# Patient Record
Sex: FEMALE | Race: WHITE | NOT HISPANIC OR LATINO | Employment: FULL TIME | URBAN - METROPOLITAN AREA
[De-identification: names, ages, dates, MRNs, and addresses within clinical notes are randomized per-mention and may not be internally consistent; named-entity substitution may affect disease eponyms.]

---

## 2017-04-26 ENCOUNTER — RADIANT APPOINTMENT (OUTPATIENT)
Dept: GENERAL RADIOLOGY | Facility: CLINIC | Age: 32
End: 2017-04-26
Attending: PHYSICIAN ASSISTANT
Payer: COMMERCIAL

## 2017-04-26 ENCOUNTER — OFFICE VISIT (OUTPATIENT)
Dept: FAMILY MEDICINE | Facility: CLINIC | Age: 32
End: 2017-04-26
Payer: COMMERCIAL

## 2017-04-26 VITALS
DIASTOLIC BLOOD PRESSURE: 77 MMHG | BODY MASS INDEX: 20.94 KG/M2 | SYSTOLIC BLOOD PRESSURE: 114 MMHG | OXYGEN SATURATION: 97 % | TEMPERATURE: 98.9 F | WEIGHT: 141.4 LBS | HEART RATE: 91 BPM | HEIGHT: 69 IN
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
## 2017-04-26 DIAGNOSIS — M54.2 CERVICALGIA: ICD-10-CM

## 2017-04-26 DIAGNOSIS — M54.2 CERVICALGIA: Primary | ICD-10-CM

## 2017-04-26 DIAGNOSIS — M54.6 ACUTE BILATERAL THORACIC BACK PAIN: ICD-10-CM

## 2017-04-26 PROCEDURE — 72040 X-RAY EXAM NECK SPINE 2-3 VW: CPT

## 2017-04-26 PROCEDURE — 72072 X-RAY EXAM THORAC SPINE 3VWS: CPT

## 2017-04-26 PROCEDURE — 99213 OFFICE O/P EST LOW 20 MIN: CPT | Performed by: PHYSICIAN ASSISTANT

## 2017-04-26 RX ORDER — CYCLOBENZAPRINE HCL 10 MG
5-10 TABLET ORAL 3 TIMES DAILY PRN
Qty: 30 TABLET | Refills: 1 | Status: SHIPPED | OUTPATIENT
Start: 2017-04-26 | End: 2018-03-29
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
## 2017-04-26 NOTE — PATIENT INSTRUCTIONS
Use half tabs of the muscle relaxer during the day if needed, take a whole tab before bed x1 week, as needed after that.  Aleve: 1 tab twice daily x5-7 days, as needed use after that.

## 2018-01-19 DIAGNOSIS — Z30.41 ENCOUNTER FOR SURVEILLANCE OF CONTRACEPTIVE PILLS: ICD-10-CM

## 2018-01-19 RX ORDER — LEVONORGESTREL AND ETHINYL ESTRADIOL 0.15-0.03
KIT ORAL
Qty: 84 TABLET | Refills: 0 | Status: SHIPPED | OUTPATIENT
Start: 2018-01-19 | End: 2018-03-29

## 2018-01-19 NOTE — TELEPHONE ENCOUNTER
"Requested Prescriptions   Pending Prescriptions Disp Refills     MARLISSA 0.15-30 MG-MCG per tablet [Pharmacy Med Name: MARLISSA-28 TABLET] 84 tablet 0    Last Written Prescription Date:  10/30/17  Last Fill Quantity: 84,  # refills: 0   Last Office Visit:  04/26/17  Future Office Visit:  none   Sig: TAKE 1 TABLET BY MOUTH DAILY    Contraceptives Protocol Passed    1/19/2018 12:58 AM       Passed - Patient is not a current smoker if age is 35 or older       Passed - Recent or future visit with authorizing provider's specialty    Patient had office visit in the last year or has a visit in the next 30 days with authorizing provider.  See \"Patient Info\" tab in inbasket, or \"Choose Columns\" in Meds & Orders section of the refill encounter.            Passed - No active pregnancy on record       Passed - No positive pregnancy test in past 12 months          "

## 2018-03-29 ENCOUNTER — TELEPHONE (OUTPATIENT)
Dept: FAMILY MEDICINE | Facility: CLINIC | Age: 33
End: 2018-03-29

## 2018-03-29 ENCOUNTER — OFFICE VISIT (OUTPATIENT)
Dept: FAMILY MEDICINE | Facility: CLINIC | Age: 33
End: 2018-03-29
Payer: COMMERCIAL

## 2018-03-29 VITALS
HEART RATE: 86 BPM | OXYGEN SATURATION: 98 % | TEMPERATURE: 97.7 F | BODY MASS INDEX: 19.44 KG/M2 | DIASTOLIC BLOOD PRESSURE: 73 MMHG | SYSTOLIC BLOOD PRESSURE: 111 MMHG | WEIGHT: 135.8 LBS | HEIGHT: 70 IN

## 2018-03-29 DIAGNOSIS — R09.81 CHRONIC NASAL CONGESTION: ICD-10-CM

## 2018-03-29 DIAGNOSIS — L70.0 COMEDONAL ACNE: ICD-10-CM

## 2018-03-29 DIAGNOSIS — L70.8 INFLAMMATORY ACNE: ICD-10-CM

## 2018-03-29 DIAGNOSIS — J30.2 ACUTE SEASONAL ALLERGIC RHINITIS, UNSPECIFIED TRIGGER: Primary | ICD-10-CM

## 2018-03-29 DIAGNOSIS — Z00.00 ROUTINE GENERAL MEDICAL EXAMINATION AT A HEALTH CARE FACILITY: Primary | ICD-10-CM

## 2018-03-29 DIAGNOSIS — Z30.41 ENCOUNTER FOR SURVEILLANCE OF CONTRACEPTIVE PILLS: ICD-10-CM

## 2018-03-29 LAB
CHOLEST SERPL-MCNC: 190 MG/DL
GLUCOSE SERPL-MCNC: 89 MG/DL (ref 70–99)
HDLC SERPL-MCNC: 93 MG/DL
LDLC SERPL CALC-MCNC: 89 MG/DL
NONHDLC SERPL-MCNC: 97 MG/DL
TRIGL SERPL-MCNC: 41 MG/DL

## 2018-03-29 PROCEDURE — 87624 HPV HI-RISK TYP POOLED RSLT: CPT | Performed by: PHYSICIAN ASSISTANT

## 2018-03-29 PROCEDURE — 80061 LIPID PANEL: CPT | Performed by: PHYSICIAN ASSISTANT

## 2018-03-29 PROCEDURE — 99395 PREV VISIT EST AGE 18-39: CPT | Mod: 25 | Performed by: PHYSICIAN ASSISTANT

## 2018-03-29 PROCEDURE — G0145 SCR C/V CYTO,THINLAYER,RESCR: HCPCS | Performed by: PHYSICIAN ASSISTANT

## 2018-03-29 PROCEDURE — 90471 IMMUNIZATION ADMIN: CPT | Performed by: PHYSICIAN ASSISTANT

## 2018-03-29 PROCEDURE — 82947 ASSAY GLUCOSE BLOOD QUANT: CPT | Performed by: PHYSICIAN ASSISTANT

## 2018-03-29 PROCEDURE — 90632 HEPA VACCINE ADULT IM: CPT | Performed by: PHYSICIAN ASSISTANT

## 2018-03-29 PROCEDURE — 36415 COLL VENOUS BLD VENIPUNCTURE: CPT | Performed by: PHYSICIAN ASSISTANT

## 2018-03-29 RX ORDER — FLUTICASONE PROPIONATE 50 MCG
2 SPRAY, SUSPENSION (ML) NASAL DAILY
Qty: 16 G | Refills: 11 | Status: SHIPPED | OUTPATIENT
Start: 2018-03-29 | End: 2018-03-29

## 2018-03-29 RX ORDER — MOMETASONE FUROATE MONOHYDRATE 50 UG/1
2 SPRAY, METERED NASAL DAILY
Qty: 3 BOX | Refills: 3 | Status: SHIPPED | OUTPATIENT
Start: 2018-03-29 | End: 2020-11-10

## 2018-03-29 RX ORDER — LEVONORGESTREL AND ETHINYL ESTRADIOL 0.15-0.03
1 KIT ORAL DAILY
Qty: 84 TABLET | Refills: 3 | Status: SHIPPED | OUTPATIENT
Start: 2018-03-29 | End: 2019-04-23

## 2018-03-29 RX ORDER — ERYTHROMYCIN AND BENZOYL PEROXIDE 30; 50 MG/G; MG/G
GEL TOPICAL
Qty: 45 G | Refills: 3 | Status: ON HOLD | OUTPATIENT
Start: 2018-03-29 | End: 2022-12-27

## 2018-03-29 RX ORDER — ADAPALENE 45 G/G
GEL TOPICAL
Qty: 45 G | Refills: 3 | Status: SHIPPED | OUTPATIENT
Start: 2018-03-29 | End: 2020-11-10

## 2018-03-29 ASSESSMENT — ANXIETY QUESTIONNAIRES
6. BECOMING EASILY ANNOYED OR IRRITABLE: NOT AT ALL
5. BEING SO RESTLESS THAT IT IS HARD TO SIT STILL: NOT AT ALL
3. WORRYING TOO MUCH ABOUT DIFFERENT THINGS: NOT AT ALL
GAD7 TOTAL SCORE: 0
2. NOT BEING ABLE TO STOP OR CONTROL WORRYING: NOT AT ALL
IF YOU CHECKED OFF ANY PROBLEMS ON THIS QUESTIONNAIRE, HOW DIFFICULT HAVE THESE PROBLEMS MADE IT FOR YOU TO DO YOUR WORK, TAKE CARE OF THINGS AT HOME, OR GET ALONG WITH OTHER PEOPLE: NOT DIFFICULT AT ALL
1. FEELING NERVOUS, ANXIOUS, OR ON EDGE: NOT AT ALL
7. FEELING AFRAID AS IF SOMETHING AWFUL MIGHT HAPPEN: NOT AT ALL

## 2018-03-29 ASSESSMENT — PATIENT HEALTH QUESTIONNAIRE - PHQ9: 5. POOR APPETITE OR OVEREATING: NOT AT ALL

## 2018-03-29 NOTE — MR AVS SNAPSHOT
After Visit Summary   3/29/2018    Debbi Grossman    MRN: [id number]           Patient Information     Date Of Birth          1985        Visit Information        Provider Department      3/29/2018 7:20 AM Marixa Roche PA-C [facility]        Today's Diagnoses     Routine general medical examination at a health care facility    -  1    Encounter for surveillance of contraceptive pills        Comedonal acne        Chronic nasal congestion        Inflammatory acne          Care Instructions      Preventive Health Recommendations  Female Ages 26 - 39  Yearly exam:   See your health care provider every year in order to    Review health changes.     Discuss preventive care.      Review your medicines if you your doctor has prescribed any.    Until age 30: Get a Pap test every three years (more often if you have had an abnormal result).    After age 30: Talk to your doctor about whether you should have a Pap test every 3 years or have a Pap test with HPV screening every 5 years.   You do not need a Pap test if your uterus was removed (hysterectomy) and you have not had cancer.  You should be tested each year for STDs (sexually transmitted diseases), if you're at risk.   Talk to your provider about how often to have your cholesterol checked.  If you are at risk for diabetes, you should have a diabetes test (fasting glucose).  Shots: Get a flu shot each year. Get a tetanus shot every 10 years.   Nutrition:     Eat at least 5 servings of fruits and vegetables each day.    Eat whole-grain bread, whole-wheat pasta and brown rice instead of white grains and rice.    Talk to your provider about Calcium and Vitamin D.     Lifestyle    Exercise at least 150 minutes a week (30 minutes a day, 5 days of the week). This will help you control your weight and prevent disease.    Limit alcohol to one drink per day.    No smoking.     Wear sunscreen to prevent skin cancer.    See your dentist every  six months for an exam and cleaning.      Preventive Health Recommendations  Female Ages 26 - 39  Yearly exam:   See your health care provider every year in order to    Review health changes.     Discuss preventive care.      Review your medicines if you your doctor has prescribed any.    Until age 30: Get a Pap test every three years (more often if you have had an abnormal result).    After age 30: Talk to your doctor about whether you should have a Pap test every 3 years or have a Pap test with HPV screening every 5 years.   You do not need a Pap test if your uterus was removed (hysterectomy) and you have not had cancer.  You should be tested each year for STDs (sexually transmitted diseases), if you're at risk.   Talk to your provider about how often to have your cholesterol checked.  If you are at risk for diabetes, you should have a diabetes test (fasting glucose).  Shots: Get a flu shot each year. Get a tetanus shot every 10 years.   Nutrition:     Eat at least 5 servings of fruits and vegetables each day.    Eat whole-grain bread, whole-wheat pasta and brown rice instead of white grains and rice.    Talk to your provider about Calcium and Vitamin D.     Lifestyle    Exercise at least 150 minutes a week (30 minutes a day, 5 days of the week). This will help you control your weight and prevent disease.    Limit alcohol to one drink per day.    No smoking.     Wear sunscreen to prevent skin cancer.    See your dentist every six months for an exam and cleaning.            Follow-ups after your visit        Who to contact     Normal or non-critical lab and imaging results will be communicated to you by MyChart, letter or phone within 4 business days after the clinic has received the results. If you do not hear from us within 7 days, please contact the clinic through MyChart or phone. If you have a critical or abnormal lab result, we will notify you by phone as soon as possible.  Submit refill requests through  "[employer] or call your pharmacy and they will forward the refill request to us. Please allow 3 business days for your refill to be completed.          If you need to speak with a  for additional information , please call: [phone]             Additional Information About Your Visit        MyChart Information     [employer] gives you secure access to your electronic health record. If you see a primary care provider, you can also send messages to your care team and make appointments. If you have questions, please call your primary care clinic.  If you do not have a primary care provider, please call [phone] and they will assist you.        Care EveryWhere ID     This is your Care EveryWhere ID. This could be used by other organizations to access your [place] medical records  [id number]        Your Vitals Were     Pulse Temperature Height Last Period Pulse Oximetry BMI (Body Mass Index)    86 97.7  F (36.5  C) (Tympanic) 5' 9.61\" (1.768 m) 03/15/2018 98% 19.71 kg/m2       Blood Pressure from Last 3 Encounters:   03/29/18 111/73   04/26/17 114/77   12/29/16 106/71    Weight from Last 3 Encounters:   03/29/18 135 lb 12.8 oz (61.6 kg)   04/26/17 141 lb 6.4 oz (64.1 kg)   12/29/16 140 lb 12.8 oz (63.9 kg)              We Performed the Following     Glucose     HEPATITIS A VACCINE (ADULT)     HPV High Risk Types DNA Cervical     Lipid panel reflex to direct LDL Fasting     Pap imaged thin layer screen with HPV - recommended age 30 - 65 years (select HPV order below)          Today's Medication Changes          These changes are accurate as of 3/29/18  8:09 AM.  If you have any questions, ask your nurse or doctor.               These medicines have changed or have updated prescriptions.        Dose/Directions    levonorgestrel-ethinyl estradiol 0.15-30 MG-MCG per tablet   Commonly known as:  MARLISSA   This may have changed:  See the new instructions.   Used for:  Encounter for surveillance of " contraceptive pills   Changed by:  Marixa Roche PA-C        Dose:  1 tablet   Take 1 tablet by mouth daily   Quantity:  84 tablet   Refills:  3         Stop taking these medicines if you haven't already. Please contact your care team if you have questions.     cyclobenzaprine 10 MG tablet   Commonly known as:  FLEXERIL   Stopped by:  Marixa Roche PA-C                Where to get your medicines      These medications were sent to [place] [address] [facility] [place] - [address]  [address] [place] [address]     Phone:  [phone]     adapalene 0.1 % gel    benzoyl peroxide-erythromycin topical gel    fluticasone 50 MCG/ACT spray    levonorgestrel-ethinyl estradiol 0.15-30 MG-MCG per tablet                Primary Care Provider Office Phone # Fax #    Marixa Roche PA-C [phone] [address] [facility] [address]  [place] [address]        Equal Access to Services     [facility]: Hadii aad ku hadasho Soomaali, waaxda luqadaha, qaybta kaalmada adeegyada, waxay idiin hayaan adeeg kharasandra la'michel . So [facility] [phone].    ATENCIÓN: Si habla español, tiene a toney disposición servicios gratuitos de asistencia lingüística. [facility] [phone].    We comply with applicable federal civil rights laws and [place] laws. We do not discriminate on the basis of race, color, national origin, age, disability, sex, sexual orientation, or gender identity.            Thank you!     Thank you for choosing [facility]  for your care. Our goal is always to provide you with excellent care. Hearing back from our patients is one way we can continue to improve our services. Please take a few minutes to complete the written survey that you may receive in the mail after your visit with us. Thank you!             Your Updated Medication List - Protect others around you: Learn how to safely use, store and throw away your medicines at www.disposemymeds.org.          This list is accurate  as of 3/29/18  8:09 AM.  Always use your most recent med list.                   Brand Name Dispense Instructions for use Diagnosis    adapalene 0.1 % gel    DIFFERIN    45 g    Apply  topically At Bedtime.Need annual office visit    Comedonal acne       benzoyl peroxide-erythromycin topical gel    BENZAMYCIN    45 g    Apply twice daily    Inflammatory acne       fexofenadine-pseudoePHEDrine 180-240 MG per 24 hr tablet    ALLEGRA-D 24    90 tablet    Take 1 tablet by mouth daily.    Chronic nasal congestion       fluticasone 50 MCG/ACT spray    FLONASE    16 g    Spray 2 sprays into both nostrils daily    Chronic nasal congestion       levonorgestrel-ethinyl estradiol 0.15-30 MG-MCG per tablet    MARLISSA    84 tablet    Take 1 tablet by mouth daily    Encounter for surveillance of contraceptive pills

## 2018-03-29 NOTE — PROGRESS NOTES
SUBJECTIVE:   CC: Debbi Grossman is an 32 year old woman who presents for preventive health visit.     Healthy Habits:    Do you get at least three servings of calcium containing foods daily (dairy, green leafy vegetables, etc.)? yes    Amount of exercise or daily activities, outside of work: 3 day(s) per week    Problems taking medications regularly No    Medication side effects: No    Have you had an eye exam in the past two years? yes    Do you see a dentist twice per year? yes    Do you have sleep apnea, excessive snoring or daytime drowsiness?no      PROBLEMS TO ADD ON...  none  Patient informed that anything we discuss that is not related to preventative medicine, may be billed for; patient verbalizes understanding.    Today's PHQ-2 Score:   PHQ-2 ( 1999 [employer]) 12/29/2016 8/4/2015   Q1: Little interest or pleasure in doing things 0 0   Q2: Feeling down, depressed or hopeless 0 0   PHQ-2 Score 0 0       Abuse: Current or Past(Physical, Sexual or Emotional)- No  Do you feel safe in your environment - Yes    Social History   Substance Use Topics     Smoking status: Never Smoker     Smokeless tobacco: Never Used     Alcohol use Yes      Comment: Less than once a month     If you drink alcohol do you typically have >3 drinks per day or >7 drinks per week? No                     Reviewed orders with patient.  Reviewed health maintenance and updated orders accordingly - Yes  Labs reviewed in EPIC    Mammogram not appropriate for this patient based on age.    Pertinent mammograms are reviewed under the imaging tab.  History of abnormal Pap smear:   NO - age 30- 65 PAP every 3 years recommended  Last 3 Pap Results:   PAP (no units)   Date Value   08/04/2015 NIL   05/10/2012 NIL   02/16/2011 NIL       Reviewed and updated as needed this visit by clinical staff  Tobacco  Allergies  Meds         Reviewed and updated as needed this visit by Provider        Past Medical History:   Diagnosis Date     CP (cerebral  "palsy) (H)      Seasonal allergies         ROS:  Other than what is noted in the HPI and PMH a complete review of systems is otherwise negative including: Constitutional, HEENT, endocrine, cardiovascular, respiratory, GI/, musculoskeletal, neuro, and psychiatric.     OBJECTIVE:   /73  Pulse 86  Temp 97.7  F (36.5  C) (Tympanic)  Ht 5' 9.61\" (1.768 m)  Wt 135 lb 12.8 oz (61.6 kg)  LMP 03/15/2018  SpO2 98%  BMI 19.71 kg/m2  EXAM:  GENERAL: healthy, alert and no distress  EYES: Eyes grossly normal to inspection, PERRL and conjunctivae and sclerae normal  HENT: ear canals and TM's normal, nose and mouth without ulcers or lesions  NECK: no adenopathy, no asymmetry, masses, or scars and thyroid normal to palpation  RESP: lungs clear to auscultation - no rales, rhonchi or wheezes  BREAST: normal without masses, tenderness or nipple discharge and no palpable axillary masses or adenopathy  CV: regular rate and rhythm, normal S1 S2, no S3 or S4, no murmur, click or rub, no peripheral edema and peripheral pulses strong  ABDOMEN: soft, nontender, no hepatosplenomegaly, no masses and bowel sounds normal   (female): normal female external genitalia, normal urethral meatus, vaginal mucosa pink, moist, well rugated, and normal cervix  MS: no gross musculoskeletal defects noted, no edema  SKIN: no suspicious lesions or rashes  NEURO: Normal strength and tone, mentation intact and speech normal  PSYCH: mentation appears normal, affect normal/bright    ASSESSMENT/PLAN:       ICD-10-CM    1. Routine general medical examination at a health care facility Z00.00 Lipid panel reflex to direct LDL Fasting     Glucose     Pap imaged thin layer screen with HPV - recommended age 30 - 65 years (select HPV order below)     HPV High Risk Types DNA Cervical     HEPATITIS A VACCINE (ADULT)   2. Encounter for surveillance of contraceptive pills Z30.41 levonorgestrel-ethinyl estradiol (MARLISSA) 0.15-30 MG-MCG per tablet   3. " "Comedonal acne L70.0 adapalene (DIFFERIN) 0.1 % gel   4. Chronic nasal congestion R09.81 fluticasone (FLONASE) 50 MCG/ACT spray   5. Inflammatory acne L70.8 benzoyl peroxide-erythromycin (BENZAMYCIN) topical gel       Meds renewed, no changes. Pap and screening labs today. Follow up annually otherwise.      COUNSELING:   Reviewed preventive health counseling, as reflected in patient instructions       reports that she has never smoked. She has never used smokeless tobacco.    Estimated body mass index is 19.71 kg/(m^2) as calculated from the following:    Height as of this encounter: 5' 9.61\" (1.768 m).    Weight as of this encounter: 135 lb 12.8 oz (61.6 kg).       Counseling Resources:  ATP IV Guidelines  Pooled Cohorts Equation Calculator  Breast Cancer Risk Calculator  FRAX Risk Assessment  ICSI Preventive Guidelines  Dietary Guidelines for Americans, 2010  USDA's MyPlate  ASA Prophylaxis  Lung CA Screening    Marixa Roche PA-C  [facility] [place]    "

## 2018-03-29 NOTE — LETTER
April 5, 2018    Debbi Grossman  [address] [place] [address]    Dear Debbi,  We are happy to inform you that your PAP smear result from 3/29/18 is normal.  We are now able to do a follow up test on PAP smears. The DNA test is for HPV (Human Papilloma Virus). Cervical cancer is closely linked with certain types of HPV. Your results showed no evidence of high risk HPV.  Therefore we recommend you return in 3 years for your next pap smear.  You will still need to return to the clinic every year for an annual exam and other preventive tests.  Please contact the clinic at [phone] with any questions.  Sincerely,    Marixa Roche PA-C/nicholas

## 2018-03-29 NOTE — TELEPHONE ENCOUNTER
INSURANCE DOES NOT COVER FLUTICASONE. INSURANCE COVERS MOMETASONE.   [facility] -[phone]

## 2018-03-29 NOTE — NURSING NOTE
Patient presented to clinic for Bilateral ear wash. Both ear(s) were inspected with an otoscope and found to have cerumen in the ear canal(s). The patient's ear(s) were washed out with a warm water. The patient did tolerate the procedure well.     Hep A vaccine given today     Screening performed by Shailesh Dawkins on 3/29/2018 at 8:41 AM.

## 2018-03-30 ASSESSMENT — ANXIETY QUESTIONNAIRES: GAD7 TOTAL SCORE: 0

## 2018-03-30 ASSESSMENT — PATIENT HEALTH QUESTIONNAIRE - PHQ9: SUM OF ALL RESPONSES TO PHQ QUESTIONS 1-9: 0

## 2018-04-03 LAB
COPATH REPORT: NORMAL
PAP: NORMAL

## 2018-04-04 LAB
FINAL DIAGNOSIS: NORMAL
HPV HR 12 DNA CVX QL NAA+PROBE: NEGATIVE
HPV16 DNA SPEC QL NAA+PROBE: NEGATIVE
HPV18 DNA SPEC QL NAA+PROBE: NEGATIVE
SPECIMEN DESCRIPTION: NORMAL
SPECIMEN SOURCE CVX/VAG CYTO: NORMAL

## 2018-04-14 DIAGNOSIS — Z30.41 ENCOUNTER FOR SURVEILLANCE OF CONTRACEPTIVE PILLS: ICD-10-CM

## 2018-04-16 RX ORDER — LEVONORGESTREL AND ETHINYL ESTRADIOL 0.15-0.03
KIT ORAL
Qty: 84 TABLET | Refills: 0 | OUTPATIENT
Start: 2018-04-16

## 2018-04-16 NOTE — TELEPHONE ENCOUNTER
"Requested Prescriptions   Pending Prescriptions Disp Refills     MARLISSA 0.15-30 MG-MCG per tablet [Pharmacy Med Name: MARLISSA-28 TABLET] 84 tablet 0    Last Written Prescription Date:  1-23-18  Last Fill Quantity: 84,  # refills: 0   Last office visit: 3/29/2018 with prescribing provider:  3-29-18   Future Office Visit:   Sig: TAKE 1 TABLET BY MOUTH DAILY    Contraceptives Protocol Passed    4/14/2018  1:08 AM       Passed - Patient is not a current smoker if age is 35 or older       Passed - Recent (12 mo) or future (30 days) visit within the authorizing provider's specialty    Patient had office visit in the last 12 months or has a visit in the next 30 days with authorizing provider or within the authorizing provider's specialty.  See \"Patient Info\" tab in inbasket, or \"Choose Columns\" in Meds & Orders section of the refill encounter.           Passed - No active pregnancy on record       Passed - No positive pregnancy test in past 12 months        "

## 2019-01-15 ENCOUNTER — NURSE TRIAGE (OUTPATIENT)
Dept: NURSING | Facility: CLINIC | Age: 34
End: 2019-01-15

## 2019-01-15 NOTE — TELEPHONE ENCOUNTER
FNA triage call :   Presenting problem :  From phone  8 weeks pregnant  Pt called and has not been seen yet .  Constipation concern abdominal pain is 3/10 and mild bloated but relieved with BM  , last BM yesterday , nauseated last week for 2 hour , and again 1/10/19 and 1/11/19 for few hours .  Currently : no fever ,   Guideline used :  Pregnancy - constipation and then  pregnancy - abdominal pain < 20 weeks.   Disposition and recommendations : NPO and have someone drive you to  ED but Pt declines , and declines help with appt for 2nd opinion   . Reviewed Epic reference on planning exercise , pregnancy .   Caller verbalizes understanding and denies further questions and will call back if further symptoms to triage or questions  . Beronica Saldana RN  - [place] Nurse Advisor       Reason for Disposition    MODERATE-SEVERE abdominal pain (e.g., interferes with normal activities, awakens from sleep)    Unable to have a bowel movement (BM) without laxative or enema    Additional Information    Negative: [1] Abdominal pain AND [2] pregnant > 20 weeks    [1] Abdominal pain AND [2] pregnant < 20 weeks    Negative: Passed out (i.e., lost consciousness, collapsed and was not responding)    Negative: Shock suspected (e.g., cold/pale/clammy skin, too weak to stand, low BP, rapid pulse)    Negative: Difficult to awaken or acting confused  (e.g., disoriented, slurred speech)    Negative: Sounds like a life-threatening emergency to the triager    Negative: Followed an abdomen (stomach) injury    Negative: [1] Abdominal pain AND [2] pregnant > 20 weeks    Protocols used: PREGNANCY - ABDOMINAL PAIN LESS THAN 20 WEEKS EGA-ADULT-AH, PREGNANCY - CONSTIPATION-ADULT-AH

## 2019-01-28 DIAGNOSIS — O36.80X0 PREGNANCY WITH INCONCLUSIVE FETAL VIABILITY: Primary | ICD-10-CM

## 2019-01-29 ENCOUNTER — PRENATAL OFFICE VISIT (OUTPATIENT)
Dept: OBGYN | Facility: CLINIC | Age: 34
End: 2019-01-29
Payer: COMMERCIAL

## 2019-01-29 ENCOUNTER — ANCILLARY PROCEDURE (OUTPATIENT)
Dept: ULTRASOUND IMAGING | Facility: CLINIC | Age: 34
End: 2019-01-29
Payer: COMMERCIAL

## 2019-01-29 VITALS
HEART RATE: 68 BPM | HEIGHT: 70 IN | BODY MASS INDEX: 20.04 KG/M2 | DIASTOLIC BLOOD PRESSURE: 74 MMHG | SYSTOLIC BLOOD PRESSURE: 108 MMHG | WEIGHT: 140 LBS

## 2019-01-29 DIAGNOSIS — Z23 NEED FOR PROPHYLACTIC VACCINATION AND INOCULATION AGAINST INFLUENZA: ICD-10-CM

## 2019-01-29 DIAGNOSIS — O36.80X0 PREGNANCY WITH INCONCLUSIVE FETAL VIABILITY: ICD-10-CM

## 2019-01-29 DIAGNOSIS — Z13.79 GENETIC SCREENING: ICD-10-CM

## 2019-01-29 DIAGNOSIS — Z34.01 ENCOUNTER FOR SUPERVISION OF NORMAL FIRST PREGNANCY IN FIRST TRIMESTER: Primary | ICD-10-CM

## 2019-01-29 PROBLEM — Z34.00 SUPERVISION OF NORMAL IUP (INTRAUTERINE PREGNANCY) IN PRIMIGRAVIDA: Status: ACTIVE | Noted: 2019-01-29

## 2019-01-29 LAB
ABO + RH BLD: NORMAL
ABO + RH BLD: NORMAL
ALBUMIN UR-MCNC: NEGATIVE MG/DL
APPEARANCE UR: CLEAR
BACTERIA #/AREA URNS HPF: ABNORMAL /HPF
BILIRUB UR QL STRIP: NEGATIVE
BLD GP AB SCN SERPL QL: NORMAL
BLOOD BANK CMNT PATIENT-IMP: NORMAL
COLOR UR AUTO: YELLOW
ERYTHROCYTE [DISTWIDTH] IN BLOOD BY AUTOMATED COUNT: 12.9 % (ref 10–15)
GLUCOSE UR STRIP-MCNC: NEGATIVE MG/DL
HCT VFR BLD AUTO: 36.9 % (ref 35–47)
HGB BLD-MCNC: 12.7 G/DL (ref 11.7–15.7)
HGB UR QL STRIP: NEGATIVE
KETONES UR STRIP-MCNC: ABNORMAL MG/DL
LEUKOCYTE ESTERASE UR QL STRIP: ABNORMAL
MCH RBC QN AUTO: 31.3 PG (ref 26.5–33)
MCHC RBC AUTO-ENTMCNC: 34.4 G/DL (ref 31.5–36.5)
MCV RBC AUTO: 91 FL (ref 78–100)
NITRATE UR QL: NEGATIVE
NON-SQ EPI CELLS #/AREA URNS LPF: ABNORMAL /LPF
PH UR STRIP: 5.5 PH (ref 5–7)
PLATELET # BLD AUTO: 266 10E9/L (ref 150–450)
RBC # BLD AUTO: 4.06 10E12/L (ref 3.8–5.2)
RBC #/AREA URNS AUTO: ABNORMAL /HPF
SOURCE: ABNORMAL
SP GR UR STRIP: 1.02 (ref 1–1.03)
SPECIMEN EXP DATE BLD: NORMAL
UROBILINOGEN UR STRIP-ACNC: 0.2 EU/DL (ref 0.2–1)
WBC # BLD AUTO: 8.9 10E9/L (ref 4–11)
WBC #/AREA URNS AUTO: ABNORMAL /HPF

## 2019-01-29 PROCEDURE — 87340 HEPATITIS B SURFACE AG IA: CPT | Performed by: OBSTETRICS & GYNECOLOGY

## 2019-01-29 PROCEDURE — 36415 COLL VENOUS BLD VENIPUNCTURE: CPT | Performed by: OBSTETRICS & GYNECOLOGY

## 2019-01-29 PROCEDURE — 86901 BLOOD TYPING SEROLOGIC RH(D): CPT | Performed by: OBSTETRICS & GYNECOLOGY

## 2019-01-29 PROCEDURE — 99207 ZZC FIRST OB VISIT: CPT | Performed by: OBSTETRICS & GYNECOLOGY

## 2019-01-29 PROCEDURE — 87389 HIV-1 AG W/HIV-1&-2 AB AG IA: CPT | Performed by: OBSTETRICS & GYNECOLOGY

## 2019-01-29 PROCEDURE — 81001 URINALYSIS AUTO W/SCOPE: CPT | Performed by: OBSTETRICS & GYNECOLOGY

## 2019-01-29 PROCEDURE — 76817 TRANSVAGINAL US OBSTETRIC: CPT | Performed by: OBSTETRICS & GYNECOLOGY

## 2019-01-29 PROCEDURE — 86850 RBC ANTIBODY SCREEN: CPT | Performed by: OBSTETRICS & GYNECOLOGY

## 2019-01-29 PROCEDURE — 86900 BLOOD TYPING SEROLOGIC ABO: CPT | Performed by: OBSTETRICS & GYNECOLOGY

## 2019-01-29 PROCEDURE — 86780 TREPONEMA PALLIDUM: CPT | Performed by: OBSTETRICS & GYNECOLOGY

## 2019-01-29 PROCEDURE — 85027 COMPLETE CBC AUTOMATED: CPT | Performed by: OBSTETRICS & GYNECOLOGY

## 2019-01-29 PROCEDURE — 87086 URINE CULTURE/COLONY COUNT: CPT | Performed by: OBSTETRICS & GYNECOLOGY

## 2019-01-29 PROCEDURE — 86762 RUBELLA ANTIBODY: CPT | Performed by: OBSTETRICS & GYNECOLOGY

## 2019-01-29 SDOH — HEALTH STABILITY: MENTAL HEALTH: HOW OFTEN DO YOU HAVE A DRINK CONTAINING ALCOHOL?: NEVER

## 2019-01-29 ASSESSMENT — ANXIETY QUESTIONNAIRES
6. BECOMING EASILY ANNOYED OR IRRITABLE: SEVERAL DAYS
1. FEELING NERVOUS, ANXIOUS, OR ON EDGE: NOT AT ALL
7. FEELING AFRAID AS IF SOMETHING AWFUL MIGHT HAPPEN: NOT AT ALL
IF YOU CHECKED OFF ANY PROBLEMS ON THIS QUESTIONNAIRE, HOW DIFFICULT HAVE THESE PROBLEMS MADE IT FOR YOU TO DO YOUR WORK, TAKE CARE OF THINGS AT HOME, OR GET ALONG WITH OTHER PEOPLE: NOT DIFFICULT AT ALL
5. BEING SO RESTLESS THAT IT IS HARD TO SIT STILL: NOT AT ALL
3. WORRYING TOO MUCH ABOUT DIFFERENT THINGS: NOT AT ALL
GAD7 TOTAL SCORE: 1
2. NOT BEING ABLE TO STOP OR CONTROL WORRYING: NOT AT ALL

## 2019-01-29 ASSESSMENT — MIFFLIN-ST. JEOR: SCORE: 1414.1

## 2019-01-29 ASSESSMENT — PATIENT HEALTH QUESTIONNAIRE - PHQ9
5. POOR APPETITE OR OVEREATING: NOT AT ALL
SUM OF ALL RESPONSES TO PHQ QUESTIONS 1-9: 0

## 2019-01-29 NOTE — PATIENT INSTRUCTIONS
Avoid alcoholic beverages.  Discussed all genetic testing options.  Patient will decide.  Reviewed usual and expected course of pregnancy including visits, labwork, imaging, vaccinations, etc.  RTC 4wks for routine OB visit.

## 2019-01-29 NOTE — PROGRESS NOTES
SUBJECTIVE:     HPI:    This is a 33 year old female patient,  who presents for her first obstetrical visit.    NORMAN: 2019, by Last Menstrual Period.  She is 10w4d weeks.  Her cycles are regular.  Her last menstrual period was normal.   Since her LMP, she has experienced  constipation and tenderness, weight loss).   She denies nausea, emesis, abdominal pain, fatigue, headache, loss of appetite, vaginal discharge, dysuria, pelvic pain, urinary urgency, lightheadedness, urinary frequency, vaginal bleeding and hemorrhoids.    Additional History: Cerebral Palsy - Lower legs affected with numbness and weakness and works out regularly    Have you travelled during the pregnancy?No  Have your sexual partner(s) travelled during the pregnancy?No    New patient --self referral --here for first OB visit!  Planned pregnancy; started TTC in summer of 2018; somewhat irregular menses.  Sure LMP and us today confirms dating.  No vb/spotting.  Has overall been feeling quite well.  Nausea on only a handful of occasions.  Constipation has been one of her biggest issues.  Otherwise very healthy; hx cerebral palsy --occ lower extremity weakness/numbness but managed with exercise; runs regularily --hoping to do 1/2 marathon at 7 months  -no medications   -[place]  -moved to [facility] from [place] last year  -unsure about genetic screening  -had flu shot in October    HISTORY:   Planned Pregnancy: Yes  Marital Status: Single  Occupation:  at [employer]  Living in Household: Spouse    Past History:  Her past medical history   Past Medical History:   Diagnosis Date     CP (cerebral palsy) (H)      Seasonal allergies    .      She has a history of  First Pregnancy    Since her last LMP she denies use of alcohol, tobacco and street drugs.    Past medical, surgical, social and family history were reviewed and updated in [facility].        Current Outpatient Medications   Medication     adapalene (DIFFERIN) 0.1 % gel      "benzoyl peroxide-erythromycin (BENZAMYCIN) topical gel     Prenatal Multivit-Min-Fe-FA (PRENATAL VITAMINS PO)     fexofenadine-pseudoephedrine (ALLEGRA-D 24) 180-240 MG per tablet     levonorgestrel-ethinyl estradiol (MARLISSA) 0.15-30 MG-MCG per tablet     mometasone (NASONEX) 50 MCG/ACT spray     No current facility-administered medications for this visit.        ROS:   12 point review of systems negative other than symptoms noted below.  Constitutional: Weight Loss  Breast: Tenderness  Gastrointestinal: Constipation      OBJECTIVE:     EXAM:  /74 (BP Location: Left arm, Patient Position: Sitting, Cuff Size: Adult Regular)   Pulse 68   Ht 1.768 m (5' 9.61\")   Wt 63.5 kg (140 lb)   LMP 11/16/2018   BMI 20.31 kg/m   Body mass index is 20.31 kg/m .    GENERAL: healthy, alert and no distress  EYES: Eyes grossly normal to inspection, PERRL and conjunctivae and sclerae normal  HENT: ear canals and TM's normal, nose and mouth without ulcers or lesions  NECK: no adenopathy, no asymmetry, masses, or scars and thyroid normal to palpation  RESP: lungs clear to auscultation - no rales, rhonchi or wheezes  BREAST: normal without masses, tenderness or nipple discharge and no palpable axillary masses or adenopathy  CV: regular rate and rhythm, normal S1 S2, no S3 or S4, no murmur, click or rub, no peripheral edema and peripheral pulses strong  ABDOMEN: soft, nontender, no hepatosplenomegaly, no masses and bowel sounds normal  MS: no gross musculoskeletal defects noted, no edema  SKIN: no suspicious lesions or rashes  NEURO: Normal strength and tone, mentation intact and speech normal  PSYCH: mentation appears normal, affect normal/bright    ASSESSMENT/PLAN:       ICD-10-CM    1. Encounter for supervision of normal first pregnancy in first trimester Z34.01 ABO/Rh type and screen     Hepatitis B surface antigen     CBC with platelets     HIV Antigen Antibody Combo     Rubella Antibody IgG Quantitative     Treponema Abs " w Reflex to RPR and Titer     Urine Culture Aerobic Bacterial     UA with Microscopic   2. Genetic screening Z13.79 Non Invasive Prenatal Test Cell Free DNA   3. Need for prophylactic vaccination and inoculation against influenza Z23        33 year old , 10w4d weeks of pregnancy with NORMAN of 2019, by Last Menstrual Period    Discussed as follows:genetic screening, exercise in pregnancy, vaccinations, cerebral palsy    Counseling given:   - Follow up in 4-6 weeks for return OB visit.  - Recommended weight gain for pregnancy: 25-35 lbs.      PLAN/PATIENT INSTRUCTIONS:    Patient Instructions   Avoid alcoholic beverages.  Discussed all genetic testing options.  Patient will decide.  Reviewed usual and expected course of pregnancy including visits, labwork, imaging, vaccinations, etc.  RTC 4wks for routine OB visit.       Natalee Farris MD      Prenatal OB Questionnaire      Allergies as of 2019:    Allergies as of 2019 - Reviewed 2019   Allergen Reaction Noted     Seasonal allergies  2010       Current medications are:  Current Outpatient Medications   Medication Sig Dispense Refill     adapalene (DIFFERIN) 0.1 % gel Apply  topically At Bedtime.Need annual office visit 45 g 3     benzoyl peroxide-erythromycin (BENZAMYCIN) topical gel Apply twice daily 45 g 3     Prenatal Multivit-Min-Fe-FA (PRENATAL VITAMINS PO) Take 1 tablet by mouth       fexofenadine-pseudoephedrine (ALLEGRA-D 24) 180-240 MG per tablet Take 1 tablet by mouth daily. (Patient not taking: Reported on 2019) 90 tablet 1     levonorgestrel-ethinyl estradiol (MARLISSA) 0.15-30 MG-MCG per tablet Take 1 tablet by mouth daily (Patient not taking: Reported on 2019) 84 tablet 3     mometasone (NASONEX) 50 MCG/ACT spray Spray 2 sprays into both nostrils daily (Patient not taking: Reported on 2019) 3 Box 3         Early ultrasound screening tool:    Does patient have irregular periods?  No  Did patient use hormonal  birth control in the three months prior to positive urine pregnancy test? No  Is the patient breastfeeding?  No  Is the patient 10 weeks or greater at time of education visit?  Yes    Viable IUP in today's US

## 2019-01-30 LAB
BACTERIA SPEC CULT: NO GROWTH
HBV SURFACE AG SERPL QL IA: NONREACTIVE
HIV 1+2 AB+HIV1 P24 AG SERPL QL IA: NONREACTIVE
Lab: NORMAL
RUBV IGG SERPL IA-ACNC: 22 IU/ML
SPECIMEN SOURCE: NORMAL
T PALLIDUM AB SER QL: NONREACTIVE

## 2019-01-30 ASSESSMENT — ANXIETY QUESTIONNAIRES: GAD7 TOTAL SCORE: 1

## 2019-03-05 ENCOUNTER — PRENATAL OFFICE VISIT (OUTPATIENT)
Dept: OBGYN | Facility: CLINIC | Age: 34
End: 2019-03-05
Payer: COMMERCIAL

## 2019-03-05 VITALS — DIASTOLIC BLOOD PRESSURE: 60 MMHG | SYSTOLIC BLOOD PRESSURE: 118 MMHG | BODY MASS INDEX: 20.6 KG/M2 | WEIGHT: 142 LBS

## 2019-03-05 DIAGNOSIS — Z34.02 ENCOUNTER FOR SUPERVISION OF NORMAL FIRST PREGNANCY IN SECOND TRIMESTER: ICD-10-CM

## 2019-03-05 PROCEDURE — 99207 ZZC PRENATAL VISIT: CPT | Performed by: OBSTETRICS & GYNECOLOGY

## 2019-03-05 NOTE — PROGRESS NOTES
Doing well today.  Overall feeling well  No vb/spotting/cramping  No bowel/bladder issues --has used stool softener on occasion  Declines genetic screening at this time --would consider if any findings on ultrasound at 20wks  RTC 4wks --anatomy us at that time

## 2019-03-26 ENCOUNTER — ANCILLARY PROCEDURE (OUTPATIENT)
Dept: ULTRASOUND IMAGING | Facility: CLINIC | Age: 34
End: 2019-03-26
Payer: COMMERCIAL

## 2019-03-26 ENCOUNTER — PRENATAL OFFICE VISIT (OUTPATIENT)
Dept: OBGYN | Facility: CLINIC | Age: 34
End: 2019-03-26
Payer: COMMERCIAL

## 2019-03-26 VITALS — WEIGHT: 146 LBS | DIASTOLIC BLOOD PRESSURE: 66 MMHG | SYSTOLIC BLOOD PRESSURE: 110 MMHG | BODY MASS INDEX: 21.18 KG/M2

## 2019-03-26 DIAGNOSIS — Z3A.18 18 WEEKS GESTATION OF PREGNANCY: Primary | ICD-10-CM

## 2019-03-26 DIAGNOSIS — Z34.02 ENCOUNTER FOR SUPERVISION OF NORMAL FIRST PREGNANCY IN SECOND TRIMESTER: ICD-10-CM

## 2019-03-26 PROCEDURE — 76805 OB US >/= 14 WKS SNGL FETUS: CPT | Performed by: OBSTETRICS & GYNECOLOGY

## 2019-03-26 PROCEDURE — 99207 ZZC PRENATAL VISIT: CPT | Performed by: OBSTETRICS & GYNECOLOGY

## 2019-03-26 NOTE — PROGRESS NOTES
Doing well today  No FM yet  No cramping/abd pain  No vb/spotting  Anatomy us today --normal; TR, EFW 259g, ant placenta, faustino wnl; will do pinata gender reveal!!  RTC 4wks

## 2019-04-23 ENCOUNTER — PRENATAL OFFICE VISIT (OUTPATIENT)
Dept: OBGYN | Facility: CLINIC | Age: 34
End: 2019-04-23
Payer: COMMERCIAL

## 2019-04-23 VITALS — WEIGHT: 150.8 LBS | BODY MASS INDEX: 21.88 KG/M2 | SYSTOLIC BLOOD PRESSURE: 90 MMHG | DIASTOLIC BLOOD PRESSURE: 60 MMHG

## 2019-04-23 DIAGNOSIS — Z3A.22 22 WEEKS GESTATION OF PREGNANCY: Primary | ICD-10-CM

## 2019-04-23 DIAGNOSIS — Z34.02 ENCOUNTER FOR SUPERVISION OF NORMAL FIRST PREGNANCY IN SECOND TRIMESTER: ICD-10-CM

## 2019-04-23 PROCEDURE — 99207 ZZC PRENATAL VISIT: CPT | Performed by: OBSTETRICS & GYNECOLOGY

## 2019-04-23 NOTE — PROGRESS NOTES
Doing well today.  +FM  No cramping/vb/lof  Overall feeling really well!  Having a boy!!  RTC 4wks

## 2019-05-20 DIAGNOSIS — Z23 NEED FOR TDAP VACCINATION: ICD-10-CM

## 2019-05-20 DIAGNOSIS — Z34.03 ENCOUNTER FOR SUPERVISION OF NORMAL FIRST PREGNANCY IN THIRD TRIMESTER: Primary | ICD-10-CM

## 2019-05-28 ENCOUNTER — PRENATAL OFFICE VISIT (OUTPATIENT)
Dept: OBGYN | Facility: CLINIC | Age: 34
End: 2019-05-28
Payer: COMMERCIAL

## 2019-05-28 VITALS — WEIGHT: 155.4 LBS | DIASTOLIC BLOOD PRESSURE: 54 MMHG | SYSTOLIC BLOOD PRESSURE: 106 MMHG | BODY MASS INDEX: 22.55 KG/M2

## 2019-05-28 DIAGNOSIS — Z34.03 ENCOUNTER FOR SUPERVISION OF NORMAL FIRST PREGNANCY IN THIRD TRIMESTER: Primary | ICD-10-CM

## 2019-05-28 DIAGNOSIS — Z34.03 ENCOUNTER FOR SUPERVISION OF NORMAL FIRST PREGNANCY IN THIRD TRIMESTER: ICD-10-CM

## 2019-05-28 DIAGNOSIS — Z23 NEED FOR TDAP VACCINATION: ICD-10-CM

## 2019-05-28 LAB
ERYTHROCYTE [DISTWIDTH] IN BLOOD BY AUTOMATED COUNT: 13.4 % (ref 10–15)
GLUCOSE 1H P 50 G GLC PO SERPL-MCNC: 118 MG/DL (ref 60–129)
HCT VFR BLD AUTO: 33.7 % (ref 35–47)
HGB BLD-MCNC: 11.3 G/DL (ref 11.7–15.7)
MCH RBC QN AUTO: 31.7 PG (ref 26.5–33)
MCHC RBC AUTO-ENTMCNC: 33.5 G/DL (ref 31.5–36.5)
MCV RBC AUTO: 95 FL (ref 78–100)
PLATELET # BLD AUTO: 245 10E9/L (ref 150–450)
RBC # BLD AUTO: 3.56 10E12/L (ref 3.8–5.2)
WBC # BLD AUTO: 8.8 10E9/L (ref 4–11)

## 2019-05-28 PROCEDURE — 85027 COMPLETE CBC AUTOMATED: CPT | Performed by: OBSTETRICS & GYNECOLOGY

## 2019-05-28 PROCEDURE — 90715 TDAP VACCINE 7 YRS/> IM: CPT

## 2019-05-28 PROCEDURE — 90471 IMMUNIZATION ADMIN: CPT

## 2019-05-28 PROCEDURE — 82950 GLUCOSE TEST: CPT | Performed by: OBSTETRICS & GYNECOLOGY

## 2019-05-28 PROCEDURE — 99207 ZZC PRENATAL VISIT: CPT | Performed by: OBSTETRICS & GYNECOLOGY

## 2019-05-28 PROCEDURE — 36415 COLL VENOUS BLD VENIPUNCTURE: CPT | Performed by: OBSTETRICS & GYNECOLOGY

## 2019-05-28 NOTE — PROGRESS NOTES
Syphilis is a sexually transmitted disease that can cause birth defects in the babies of untreated mothers. Every pregnant patient is tested for syphilis early in each pregnancy as part of the routine lab work. The [place] Department of [facility] has seen an increase in the rate of syphilis in [place]. The [facility] now recommends testing for syphilis 3 times during a pregnancy, the new prenatal visit, 28 weeks and when admitted for delivery. Patient declines lab testing for syphilis.

## 2019-05-28 NOTE — PROGRESS NOTES
Screening Questionnaire for Adult Immunization    Are you sick today?   No   Do you have allergies to medications, food, a vaccine component or latex?   No   Have you ever had a serious reaction after receiving a vaccination?   No   Do you have a long-term health problem with heart disease, lung disease, asthma, kidney disease, metabolic disease (e.g. diabetes), anemia, or other blood disorder?   No   Do you have cancer, leukemia, HIV/AIDS, or any other immune system problem?   No   In the past 3 months, have you taken medications that affect  your immune system, such as prednisone, other steroids, or anticancer drugs; drugs for the treatment of rheumatoid arthritis, Crohn s disease, or psoriasis; or have you had radiation treatments?   No   Have you had a seizure, or a brain or other nervous system problem?   No   During the past year, have you received a transfusion of blood or blood     products, or been given immune (gamma) globulin or antiviral drug?   No   For women: Are you pregnant or is there a chance you could become        pregnant during the next month?   No   Have you received any vaccinations in the past 4 weeks?   No     Immunization questionnaire answers were all negative.        Per orders of Dr. Joel, injection of Tdap given by Annamaria Huggins. Patient instructed to remain in clinic for 15 minutes afterwards, and to report any adverse reaction to me immediately.       Screening performed by Annamaria Huggins on 5/28/2019 at 10:59 AM.

## 2019-05-28 NOTE — PROGRESS NOTES
Prenatal Visit: doing well. Good fetal movement. Glucola today. Accepts TDAP.  RTC with Dr. Farris in 2 weeks.  Julisa Joel MD

## 2019-06-12 ENCOUNTER — PRENATAL OFFICE VISIT (OUTPATIENT)
Dept: OBGYN | Facility: CLINIC | Age: 34
End: 2019-06-12
Payer: COMMERCIAL

## 2019-06-12 VITALS — WEIGHT: 153 LBS | SYSTOLIC BLOOD PRESSURE: 98 MMHG | DIASTOLIC BLOOD PRESSURE: 54 MMHG | BODY MASS INDEX: 22.2 KG/M2

## 2019-06-12 DIAGNOSIS — Z34.03 ENCOUNTER FOR SUPERVISION OF NORMAL FIRST PREGNANCY IN THIRD TRIMESTER: ICD-10-CM

## 2019-06-12 DIAGNOSIS — Z3A.29 29 WEEKS GESTATION OF PREGNANCY: Primary | ICD-10-CM

## 2019-06-12 PROCEDURE — 99207 ZZC PRENATAL VISIT: CPT | Performed by: OBSTETRICS & GYNECOLOGY

## 2019-06-12 NOTE — PROGRESS NOTES
Doing well today.  +FM  No ctx/cramping/vb/lof  Overall feeling really well  Still running and working out regularily  Questions today about breast pump --will likely just get on PP  RTC 2wks

## 2019-06-25 ENCOUNTER — PRENATAL OFFICE VISIT (OUTPATIENT)
Dept: OBGYN | Facility: CLINIC | Age: 34
End: 2019-06-25
Payer: COMMERCIAL

## 2019-06-25 VITALS — SYSTOLIC BLOOD PRESSURE: 102 MMHG | BODY MASS INDEX: 22.78 KG/M2 | DIASTOLIC BLOOD PRESSURE: 58 MMHG | WEIGHT: 157 LBS

## 2019-06-25 DIAGNOSIS — Z3A.31 31 WEEKS GESTATION OF PREGNANCY: Primary | ICD-10-CM

## 2019-06-25 DIAGNOSIS — Z34.03 ENCOUNTER FOR SUPERVISION OF NORMAL FIRST PREGNANCY IN THIRD TRIMESTER: ICD-10-CM

## 2019-06-25 PROCEDURE — 99207 ZZC PRENATAL VISIT: CPT | Performed by: OBSTETRICS & GYNECOLOGY

## 2019-06-25 NOTE — PROGRESS NOTES
Doing well today.  +FM  No ctx/cramping/vb/lof  Ran 1/2 marathon last weekend!!  Did great and felt really good  Have classes and tour scheduled  Headed to [place] for 4th of July  RTC 2wks

## 2019-07-10 ENCOUNTER — PRENATAL OFFICE VISIT (OUTPATIENT)
Dept: OBGYN | Facility: CLINIC | Age: 34
End: 2019-07-10
Payer: COMMERCIAL

## 2019-07-10 VITALS — WEIGHT: 158 LBS | BODY MASS INDEX: 22.93 KG/M2 | SYSTOLIC BLOOD PRESSURE: 106 MMHG | DIASTOLIC BLOOD PRESSURE: 60 MMHG

## 2019-07-10 DIAGNOSIS — Z3A.33 33 WEEKS GESTATION OF PREGNANCY: Primary | ICD-10-CM

## 2019-07-10 DIAGNOSIS — Z34.03 ENCOUNTER FOR SUPERVISION OF NORMAL FIRST PREGNANCY IN THIRD TRIMESTER: ICD-10-CM

## 2019-07-10 PROCEDURE — 99207 ZZC PRENATAL VISIT: CPT | Performed by: OBSTETRICS & GYNECOLOGY

## 2019-07-10 NOTE — PROGRESS NOTES
Doing well today.  +FM  No ctx/cramping/vb/lof  Had tour last night and have classes the next 3 weeks  No bowel/bladder issues  Questions today about diastasis  RTC 2wks

## 2019-07-24 ENCOUNTER — PRENATAL OFFICE VISIT (OUTPATIENT)
Dept: OBGYN | Facility: CLINIC | Age: 34
End: 2019-07-24
Payer: COMMERCIAL

## 2019-07-24 VITALS — SYSTOLIC BLOOD PRESSURE: 104 MMHG | DIASTOLIC BLOOD PRESSURE: 62 MMHG | WEIGHT: 160 LBS | BODY MASS INDEX: 23.22 KG/M2

## 2019-07-24 DIAGNOSIS — Z3A.35 35 WEEKS GESTATION OF PREGNANCY: ICD-10-CM

## 2019-07-24 DIAGNOSIS — Z34.03 ENCOUNTER FOR SUPERVISION OF NORMAL FIRST PREGNANCY IN THIRD TRIMESTER: Primary | ICD-10-CM

## 2019-07-24 DIAGNOSIS — Z36.85 ANTENATAL SCREENING FOR STREPTOCOCCUS B: ICD-10-CM

## 2019-07-24 PROCEDURE — 99207 ZZC PRENATAL VISIT: CPT | Performed by: OBSTETRICS & GYNECOLOGY

## 2019-07-24 PROCEDURE — 87653 STREP B DNA AMP PROBE: CPT | Performed by: OBSTETRICS & GYNECOLOGY

## 2019-07-24 NOTE — PROGRESS NOTES
Doing well today.  +FM  No ctx/cramping/vb/lof  GBS collected  Labor precautions reviewed  RTC 1wk

## 2019-07-26 LAB
GP B STREP DNA SPEC QL NAA+PROBE: NEGATIVE
SPECIMEN SOURCE: NORMAL

## 2019-08-02 ENCOUNTER — PRENATAL OFFICE VISIT (OUTPATIENT)
Dept: OBGYN | Facility: CLINIC | Age: 34
End: 2019-08-02
Payer: COMMERCIAL

## 2019-08-02 VITALS — BODY MASS INDEX: 22.93 KG/M2 | WEIGHT: 158 LBS | SYSTOLIC BLOOD PRESSURE: 114 MMHG | DIASTOLIC BLOOD PRESSURE: 80 MMHG

## 2019-08-02 DIAGNOSIS — Z34.03 ENCOUNTER FOR SUPERVISION OF NORMAL FIRST PREGNANCY IN THIRD TRIMESTER: ICD-10-CM

## 2019-08-02 DIAGNOSIS — Z3A.37 37 WEEKS GESTATION OF PREGNANCY: Primary | ICD-10-CM

## 2019-08-02 PROCEDURE — 99207 ZZC PRENATAL VISIT: CPT | Performed by: OBSTETRICS & GYNECOLOGY

## 2019-08-02 NOTE — PROGRESS NOTES
Doing well today.  +FM  No cramping/vb/spotting  Finished classes last week  GBS negative  Several questions today about perineal massage, episiotomy, recovery, etc  Labor precautions reviewed  RTC 1wk

## 2019-08-06 ENCOUNTER — PRENATAL OFFICE VISIT (OUTPATIENT)
Dept: OBGYN | Facility: CLINIC | Age: 34
End: 2019-08-06
Payer: COMMERCIAL

## 2019-08-06 VITALS — BODY MASS INDEX: 23.07 KG/M2 | DIASTOLIC BLOOD PRESSURE: 68 MMHG | SYSTOLIC BLOOD PRESSURE: 110 MMHG | WEIGHT: 159 LBS

## 2019-08-06 DIAGNOSIS — Z3A.37 37 WEEKS GESTATION OF PREGNANCY: Primary | ICD-10-CM

## 2019-08-06 DIAGNOSIS — Z34.03 ENCOUNTER FOR SUPERVISION OF NORMAL FIRST PREGNANCY IN THIRD TRIMESTER: ICD-10-CM

## 2019-08-06 PROCEDURE — 99207 ZZC PRENATAL VISIT: CPT | Performed by: OBSTETRICS & GYNECOLOGY

## 2019-08-13 ENCOUNTER — PRENATAL OFFICE VISIT (OUTPATIENT)
Dept: OBGYN | Facility: CLINIC | Age: 34
End: 2019-08-13
Payer: COMMERCIAL

## 2019-08-13 VITALS — DIASTOLIC BLOOD PRESSURE: 60 MMHG | WEIGHT: 162.8 LBS | BODY MASS INDEX: 23.62 KG/M2 | SYSTOLIC BLOOD PRESSURE: 112 MMHG

## 2019-08-13 DIAGNOSIS — Z3A.38 38 WEEKS GESTATION OF PREGNANCY: Primary | ICD-10-CM

## 2019-08-13 DIAGNOSIS — Z34.03 ENCOUNTER FOR SUPERVISION OF NORMAL FIRST PREGNANCY IN THIRD TRIMESTER: ICD-10-CM

## 2019-08-13 PROCEDURE — 99207 ZZC PRENATAL VISIT: CPT | Performed by: OBSTETRICS & GYNECOLOGY

## 2019-08-13 NOTE — PROGRESS NOTES
Doing well today.  +FM  No ctx/cramping/lof  Very light spotting today when she used the bathroom this AM --nothing since  Labor precautions reviewed  RTC 1wk

## 2019-08-20 ENCOUNTER — PRENATAL OFFICE VISIT (OUTPATIENT)
Dept: OBGYN | Facility: CLINIC | Age: 34
End: 2019-08-20
Payer: COMMERCIAL

## 2019-08-20 VITALS — SYSTOLIC BLOOD PRESSURE: 114 MMHG | DIASTOLIC BLOOD PRESSURE: 70 MMHG | BODY MASS INDEX: 23.22 KG/M2 | WEIGHT: 160 LBS

## 2019-08-20 DIAGNOSIS — Z3A.39 39 WEEKS GESTATION OF PREGNANCY: Primary | ICD-10-CM

## 2019-08-20 DIAGNOSIS — Z34.03 ENCOUNTER FOR SUPERVISION OF NORMAL FIRST PREGNANCY IN THIRD TRIMESTER: ICD-10-CM

## 2019-08-20 PROCEDURE — 99207 ZZC PRENATAL VISIT: CPT | Performed by: OBSTETRICS & GYNECOLOGY

## 2019-08-20 NOTE — PROGRESS NOTES
Doing well today.  +FM  No ctx/cramping/vb/lof  Still no changes  Labor precautions reviewed  RTC 1wk --will discuss postdates IOL at that time if undelivered

## 2019-08-22 ENCOUNTER — NURSE TRIAGE (OUTPATIENT)
Dept: NURSING | Facility: CLINIC | Age: 34
End: 2019-08-22

## 2019-08-23 ENCOUNTER — ANESTHESIA (OUTPATIENT)
Dept: OBGYN | Facility: CLINIC | Age: 34
End: 2019-08-23
Payer: COMMERCIAL

## 2019-08-23 ENCOUNTER — HOSPITAL ENCOUNTER (INPATIENT)
Facility: CLINIC | Age: 34
LOS: 5 days | Discharge: HOME HEALTH | End: 2019-08-28
Attending: OBSTETRICS & GYNECOLOGY | Admitting: OBSTETRICS & GYNECOLOGY
Payer: COMMERCIAL

## 2019-08-23 ENCOUNTER — ANESTHESIA EVENT (OUTPATIENT)
Dept: SURGERY | Facility: CLINIC | Age: 34
End: 2019-08-23
Payer: COMMERCIAL

## 2019-08-23 ENCOUNTER — ANESTHESIA EVENT (OUTPATIENT)
Dept: OBGYN | Facility: CLINIC | Age: 34
End: 2019-08-23
Payer: COMMERCIAL

## 2019-08-23 ENCOUNTER — NURSE TRIAGE (OUTPATIENT)
Dept: NURSING | Facility: CLINIC | Age: 34
End: 2019-08-23

## 2019-08-23 ENCOUNTER — APPOINTMENT (OUTPATIENT)
Dept: CARDIOLOGY | Facility: CLINIC | Age: 34
End: 2019-08-23
Attending: INTERNAL MEDICINE
Payer: COMMERCIAL

## 2019-08-23 ENCOUNTER — ANESTHESIA (OUTPATIENT)
Dept: SURGERY | Facility: CLINIC | Age: 34
End: 2019-08-23
Payer: COMMERCIAL

## 2019-08-23 DIAGNOSIS — Z98.891 STATUS POST CESAREAN DELIVERY: ICD-10-CM

## 2019-08-23 DIAGNOSIS — I48.0 PAROXYSMAL ATRIAL FIBRILLATION (H): Primary | ICD-10-CM

## 2019-08-23 LAB
ALBUMIN SERPL-MCNC: 2.8 G/DL (ref 3.4–5)
ALBUMIN UR-MCNC: 30 MG/DL
ALP SERPL-CCNC: 134 U/L (ref 40–150)
ALT SERPL W P-5'-P-CCNC: 18 U/L (ref 0–50)
ANION GAP SERPL CALCULATED.3IONS-SCNC: 11 MMOL/L (ref 3–14)
ANION GAP SERPL CALCULATED.3IONS-SCNC: 6 MMOL/L (ref 3–14)
APPEARANCE UR: CLEAR
AST SERPL W P-5'-P-CCNC: 19 U/L (ref 0–45)
BILIRUB SERPL-MCNC: 0.4 MG/DL (ref 0.2–1.3)
BILIRUB UR QL STRIP: NEGATIVE
BUN SERPL-MCNC: 10 MG/DL (ref 7–30)
BUN SERPL-MCNC: 9 MG/DL (ref 7–30)
CALCIUM SERPL-MCNC: 7.8 MG/DL (ref 8.5–10.1)
CALCIUM SERPL-MCNC: 8.5 MG/DL (ref 8.5–10.1)
CHLORIDE SERPL-SCNC: 108 MMOL/L (ref 94–109)
CHLORIDE SERPL-SCNC: 110 MMOL/L (ref 94–109)
CO2 SERPL-SCNC: 20 MMOL/L (ref 20–32)
CO2 SERPL-SCNC: 23 MMOL/L (ref 20–32)
COLOR UR AUTO: YELLOW
CREAT SERPL-MCNC: 0.54 MG/DL (ref 0.52–1.04)
CREAT SERPL-MCNC: 0.57 MG/DL (ref 0.52–1.04)
ERYTHROCYTE [DISTWIDTH] IN BLOOD BY AUTOMATED COUNT: 12.9 % (ref 10–15)
ERYTHROCYTE [DISTWIDTH] IN BLOOD BY AUTOMATED COUNT: 13.4 % (ref 10–15)
GFR SERPL CREATININE-BSD FRML MDRD: >90 ML/MIN/{1.73_M2}
GFR SERPL CREATININE-BSD FRML MDRD: >90 ML/MIN/{1.73_M2}
GLUCOSE SERPL-MCNC: 112 MG/DL (ref 70–99)
GLUCOSE SERPL-MCNC: 120 MG/DL (ref 70–99)
GLUCOSE UR STRIP-MCNC: NEGATIVE MG/DL
HCT VFR BLD AUTO: 29.8 % (ref 35–47)
HCT VFR BLD AUTO: 39.4 % (ref 35–47)
HGB BLD-MCNC: 10 G/DL (ref 11.7–15.7)
HGB BLD-MCNC: 14.2 G/DL (ref 11.7–15.7)
HGB UR QL STRIP: NEGATIVE
KETONES UR STRIP-MCNC: >150 MG/DL
LEUKOCYTE ESTERASE UR QL STRIP: NEGATIVE
MAGNESIUM SERPL-MCNC: 1.6 MG/DL (ref 1.6–2.3)
MAGNESIUM SERPL-MCNC: 2.3 MG/DL (ref 1.6–2.3)
MCH RBC QN AUTO: 31.4 PG (ref 26.5–33)
MCH RBC QN AUTO: 32.8 PG (ref 26.5–33)
MCHC RBC AUTO-ENTMCNC: 33.6 G/DL (ref 31.5–36.5)
MCHC RBC AUTO-ENTMCNC: 36 G/DL (ref 31.5–36.5)
MCV RBC AUTO: 91 FL (ref 78–100)
MCV RBC AUTO: 94 FL (ref 78–100)
MUCOUS THREADS #/AREA URNS LPF: PRESENT /LPF
NITRATE UR QL: NEGATIVE
PH UR STRIP: 6.5 PH (ref 5–7)
PHOSPHATE SERPL-MCNC: 2.8 MG/DL (ref 2.5–4.5)
PLATELET # BLD AUTO: 177 10E9/L (ref 150–450)
PLATELET # BLD AUTO: 223 10E9/L (ref 150–450)
POTASSIUM SERPL-SCNC: 3.3 MMOL/L (ref 3.4–5.3)
POTASSIUM SERPL-SCNC: 4.1 MMOL/L (ref 3.4–5.3)
POTASSIUM SERPL-SCNC: 4.3 MMOL/L (ref 3.4–5.3)
PROT SERPL-MCNC: 6.2 G/DL (ref 6.8–8.8)
RBC # BLD AUTO: 3.18 10E12/L (ref 3.8–5.2)
RBC # BLD AUTO: 4.33 10E12/L (ref 3.8–5.2)
RBC #/AREA URNS AUTO: <1 /HPF (ref 0–2)
SODIUM SERPL-SCNC: 139 MMOL/L (ref 133–144)
SODIUM SERPL-SCNC: 139 MMOL/L (ref 133–144)
SOURCE: ABNORMAL
SP GR UR STRIP: 1.02 (ref 1–1.03)
SQUAMOUS #/AREA URNS AUTO: <1 /HPF (ref 0–1)
T4 FREE SERPL-MCNC: 1.12 NG/DL (ref 0.76–1.46)
TSH SERPL DL<=0.005 MIU/L-ACNC: 4.52 MU/L (ref 0.4–4)
UROBILINOGEN UR STRIP-MCNC: NORMAL MG/DL (ref 0–2)
WBC # BLD AUTO: 13.8 10E9/L (ref 4–11)
WBC # BLD AUTO: 21.4 10E9/L (ref 4–11)
WBC #/AREA URNS AUTO: 0 /HPF (ref 0–5)

## 2019-08-23 PROCEDURE — 25000128 H RX IP 250 OP 636: Performed by: NURSE ANESTHETIST, CERTIFIED REGISTERED

## 2019-08-23 PROCEDURE — 3E033XZ INTRODUCTION OF VASOPRESSOR INTO PERIPHERAL VEIN, PERCUTANEOUS APPROACH: ICD-10-PCS | Performed by: OBSTETRICS & GYNECOLOGY

## 2019-08-23 PROCEDURE — 49002 REOPENING OF ABDOMEN: CPT | Mod: 80 | Performed by: OBSTETRICS & GYNECOLOGY

## 2019-08-23 PROCEDURE — 37000008 ZZH ANESTHESIA TECHNICAL FEE, 1ST 30 MIN: Performed by: OBSTETRICS & GYNECOLOGY

## 2019-08-23 PROCEDURE — 99223 1ST HOSP IP/OBS HIGH 75: CPT | Performed by: INTERNAL MEDICINE

## 2019-08-23 PROCEDURE — 81001 URINALYSIS AUTO W/SCOPE: CPT | Performed by: INTERNAL MEDICINE

## 2019-08-23 PROCEDURE — 25000125 ZZHC RX 250: Performed by: OBSTETRICS & GYNECOLOGY

## 2019-08-23 PROCEDURE — 25000125 ZZHC RX 250: Performed by: SURGERY

## 2019-08-23 PROCEDURE — BT141ZZ FLUOROSCOPY OF KIDNEYS, URETERS AND BLADDER USING LOW OSMOLAR CONTRAST: ICD-10-PCS | Performed by: OBSTETRICS & GYNECOLOGY

## 2019-08-23 PROCEDURE — 12000000 ZZH R&B MED SURG/OB

## 2019-08-23 PROCEDURE — 36000058 ZZH SURGERY LEVEL 3 EA 15 ADDTL MIN: Performed by: OBSTETRICS & GYNECOLOGY

## 2019-08-23 PROCEDURE — 93306 TTE W/DOPPLER COMPLETE: CPT

## 2019-08-23 PROCEDURE — 25000128 H RX IP 250 OP 636

## 2019-08-23 PROCEDURE — 25000125 ZZHC RX 250: Performed by: ANESTHESIOLOGY

## 2019-08-23 PROCEDURE — 27110038 ZZH RX 271: Performed by: ANESTHESIOLOGY

## 2019-08-23 PROCEDURE — 86923 COMPATIBILITY TEST ELECTRIC: CPT | Performed by: OBSTETRICS & GYNECOLOGY

## 2019-08-23 PROCEDURE — 49002 REOPENING OF ABDOMEN: CPT | Mod: 78 | Performed by: OBSTETRICS & GYNECOLOGY

## 2019-08-23 PROCEDURE — P9041 ALBUMIN (HUMAN),5%, 50ML: HCPCS | Performed by: NURSE ANESTHETIST, CERTIFIED REGISTERED

## 2019-08-23 PROCEDURE — 25000128 H RX IP 250 OP 636: Performed by: INTERNAL MEDICINE

## 2019-08-23 PROCEDURE — C1758 CATHETER, URETERAL: HCPCS | Performed by: OBSTETRICS & GYNECOLOGY

## 2019-08-23 PROCEDURE — 93010 ELECTROCARDIOGRAM REPORT: CPT | Performed by: INTERNAL MEDICINE

## 2019-08-23 PROCEDURE — 25000132 ZZH RX MED GY IP 250 OP 250 PS 637

## 2019-08-23 PROCEDURE — 86901 BLOOD TYPING SEROLOGIC RH(D): CPT | Performed by: OBSTETRICS & GYNECOLOGY

## 2019-08-23 PROCEDURE — 00HU33Z INSERTION OF INFUSION DEVICE INTO SPINAL CANAL, PERCUTANEOUS APPROACH: ICD-10-PCS | Performed by: ANESTHESIOLOGY

## 2019-08-23 PROCEDURE — 0UQC0ZZ REPAIR CERVIX, OPEN APPROACH: ICD-10-PCS | Performed by: OBSTETRICS & GYNECOLOGY

## 2019-08-23 PROCEDURE — 36000056 ZZH SURGERY LEVEL 3 1ST 30 MIN: Performed by: OBSTETRICS & GYNECOLOGY

## 2019-08-23 PROCEDURE — 37000011 ZZH ANESTHESIA WARD SERVICE

## 2019-08-23 PROCEDURE — 83735 ASSAY OF MAGNESIUM: CPT | Performed by: OBSTETRICS & GYNECOLOGY

## 2019-08-23 PROCEDURE — 0TJB8ZZ INSPECTION OF BLADDER, VIA NATURAL OR ARTIFICIAL OPENING ENDOSCOPIC: ICD-10-PCS | Performed by: OBSTETRICS & GYNECOLOGY

## 2019-08-23 PROCEDURE — 25800030 ZZH RX IP 258 OP 636: Performed by: NURSE ANESTHETIST, CERTIFIED REGISTERED

## 2019-08-23 PROCEDURE — 25000128 H RX IP 250 OP 636: Performed by: ANESTHESIOLOGY

## 2019-08-23 PROCEDURE — 99222 1ST HOSP IP/OBS MODERATE 55: CPT | Mod: 24 | Performed by: INTERNAL MEDICINE

## 2019-08-23 PROCEDURE — G0463 HOSPITAL OUTPT CLINIC VISIT: HCPCS | Mod: 25

## 2019-08-23 PROCEDURE — 25000125 ZZHC RX 250: Performed by: NURSE ANESTHETIST, CERTIFIED REGISTERED

## 2019-08-23 PROCEDURE — 85027 COMPLETE CBC AUTOMATED: CPT | Performed by: OBSTETRICS & GYNECOLOGY

## 2019-08-23 PROCEDURE — 25800030 ZZH RX IP 258 OP 636: Performed by: OBSTETRICS & GYNECOLOGY

## 2019-08-23 PROCEDURE — 27210794 ZZH OR GENERAL SUPPLY STERILE: Performed by: OBSTETRICS & GYNECOLOGY

## 2019-08-23 PROCEDURE — 85730 THROMBOPLASTIN TIME PARTIAL: CPT | Performed by: OBSTETRICS & GYNECOLOGY

## 2019-08-23 PROCEDURE — 99221 1ST HOSP IP/OBS SF/LOW 40: CPT | Mod: 24 | Performed by: INTERNAL MEDICINE

## 2019-08-23 PROCEDURE — 99207 ZZC CDG-MDM COMPONENT: MEETS LOW - DOWN CODED: CPT | Performed by: HOSPITALIST

## 2019-08-23 PROCEDURE — 93005 ELECTROCARDIOGRAM TRACING: CPT

## 2019-08-23 PROCEDURE — 84439 ASSAY OF FREE THYROXINE: CPT | Performed by: OBSTETRICS & GYNECOLOGY

## 2019-08-23 PROCEDURE — 80048 BASIC METABOLIC PNL TOTAL CA: CPT | Performed by: OBSTETRICS & GYNECOLOGY

## 2019-08-23 PROCEDURE — 36415 COLL VENOUS BLD VENIPUNCTURE: CPT | Performed by: OBSTETRICS & GYNECOLOGY

## 2019-08-23 PROCEDURE — C2617 STENT, NON-COR, TEM W/O DEL: HCPCS | Performed by: OBSTETRICS & GYNECOLOGY

## 2019-08-23 PROCEDURE — 71000015 ZZH RECOVERY PHASE 1 LEVEL 2 EA ADDTL HR: Performed by: OBSTETRICS & GYNECOLOGY

## 2019-08-23 PROCEDURE — 83735 ASSAY OF MAGNESIUM: CPT | Performed by: HOSPITALIST

## 2019-08-23 PROCEDURE — 86900 BLOOD TYPING SEROLOGIC ABO: CPT | Performed by: OBSTETRICS & GYNECOLOGY

## 2019-08-23 PROCEDURE — 84100 ASSAY OF PHOSPHORUS: CPT | Performed by: OBSTETRICS & GYNECOLOGY

## 2019-08-23 PROCEDURE — 0TQB0ZZ REPAIR BLADDER, OPEN APPROACH: ICD-10-PCS | Performed by: OBSTETRICS & GYNECOLOGY

## 2019-08-23 PROCEDURE — 80053 COMPREHEN METABOLIC PANEL: CPT | Performed by: OBSTETRICS & GYNECOLOGY

## 2019-08-23 PROCEDURE — 71000014 ZZH RECOVERY PHASE 1 LEVEL 2 FIRST HR: Performed by: OBSTETRICS & GYNECOLOGY

## 2019-08-23 PROCEDURE — C1769 GUIDE WIRE: HCPCS | Performed by: OBSTETRICS & GYNECOLOGY

## 2019-08-23 PROCEDURE — 85610 PROTHROMBIN TIME: CPT | Performed by: OBSTETRICS & GYNECOLOGY

## 2019-08-23 PROCEDURE — 71000012 ZZH RECOVERY PHASE 1 LEVEL 1 FIRST HR: Performed by: OBSTETRICS & GYNECOLOGY

## 2019-08-23 PROCEDURE — 86850 RBC ANTIBODY SCREEN: CPT | Performed by: OBSTETRICS & GYNECOLOGY

## 2019-08-23 PROCEDURE — 85384 FIBRINOGEN ACTIVITY: CPT | Performed by: OBSTETRICS & GYNECOLOGY

## 2019-08-23 PROCEDURE — 3E0R3BZ INTRODUCTION OF ANESTHETIC AGENT INTO SPINAL CANAL, PERCUTANEOUS APPROACH: ICD-10-PCS | Performed by: ANESTHESIOLOGY

## 2019-08-23 PROCEDURE — 40000169 ZZH STATISTIC PRE-PROCEDURE ASSESSMENT I: Performed by: OBSTETRICS & GYNECOLOGY

## 2019-08-23 PROCEDURE — 84132 ASSAY OF SERUM POTASSIUM: CPT | Performed by: HOSPITALIST

## 2019-08-23 PROCEDURE — 37000009 ZZH ANESTHESIA TECHNICAL FEE, EACH ADDTL 15 MIN: Performed by: OBSTETRICS & GYNECOLOGY

## 2019-08-23 PROCEDURE — 59025 FETAL NON-STRESS TEST: CPT

## 2019-08-23 PROCEDURE — 93306 TTE W/DOPPLER COMPLETE: CPT | Mod: 26 | Performed by: INTERNAL MEDICINE

## 2019-08-23 PROCEDURE — 25000128 H RX IP 250 OP 636: Performed by: OBSTETRICS & GYNECOLOGY

## 2019-08-23 PROCEDURE — 59510 CESAREAN DELIVERY: CPT | Performed by: OBSTETRICS & GYNECOLOGY

## 2019-08-23 PROCEDURE — 36415 COLL VENOUS BLD VENIPUNCTURE: CPT | Performed by: HOSPITALIST

## 2019-08-23 PROCEDURE — 99207 ZZC CONSULT E&M CHANGED TO INITIAL LEVEL: CPT | Performed by: INTERNAL MEDICINE

## 2019-08-23 PROCEDURE — 25000566 ZZH SEVOFLURANE, EA 15 MIN: Performed by: OBSTETRICS & GYNECOLOGY

## 2019-08-23 PROCEDURE — 84443 ASSAY THYROID STIM HORMONE: CPT | Performed by: OBSTETRICS & GYNECOLOGY

## 2019-08-23 RX ORDER — OXYTOCIN/0.9 % SODIUM CHLORIDE 30/500 ML
1-24 PLASTIC BAG, INJECTION (ML) INTRAVENOUS CONTINUOUS
Status: DISCONTINUED | OUTPATIENT
Start: 2019-08-23 | End: 2019-08-23

## 2019-08-23 RX ORDER — OXYTOCIN/0.9 % SODIUM CHLORIDE 30/500 ML
340 PLASTIC BAG, INJECTION (ML) INTRAVENOUS CONTINUOUS PRN
Status: DISCONTINUED | OUTPATIENT
Start: 2019-08-23 | End: 2019-08-28 | Stop reason: HOSPADM

## 2019-08-23 RX ORDER — FENTANYL CITRATE 50 UG/ML
100 INJECTION, SOLUTION INTRAMUSCULAR; INTRAVENOUS ONCE
Status: COMPLETED | OUTPATIENT
Start: 2019-08-23 | End: 2019-08-23

## 2019-08-23 RX ORDER — BISACODYL 10 MG
10 SUPPOSITORY, RECTAL RECTAL DAILY PRN
Status: DISCONTINUED | OUTPATIENT
Start: 2019-08-25 | End: 2019-08-28 | Stop reason: HOSPADM

## 2019-08-23 RX ORDER — HYDROMORPHONE HYDROCHLORIDE 1 MG/ML
.3-.5 INJECTION, SOLUTION INTRAMUSCULAR; INTRAVENOUS; SUBCUTANEOUS EVERY 5 MIN PRN
Status: DISCONTINUED | OUTPATIENT
Start: 2019-08-23 | End: 2019-08-23 | Stop reason: HOSPADM

## 2019-08-23 RX ORDER — CEFAZOLIN SODIUM 1 G/50ML
2 SOLUTION INTRAVENOUS
Status: COMPLETED | OUTPATIENT
Start: 2019-08-23 | End: 2019-08-24

## 2019-08-23 RX ORDER — ONDANSETRON 2 MG/ML
4 INJECTION INTRAMUSCULAR; INTRAVENOUS EVERY 6 HOURS PRN
Status: DISCONTINUED | OUTPATIENT
Start: 2019-08-23 | End: 2019-08-23

## 2019-08-23 RX ORDER — SODIUM CHLORIDE, SODIUM LACTATE, POTASSIUM CHLORIDE, CALCIUM CHLORIDE 600; 310; 30; 20 MG/100ML; MG/100ML; MG/100ML; MG/100ML
INJECTION, SOLUTION INTRAVENOUS CONTINUOUS PRN
Status: DISCONTINUED | OUTPATIENT
Start: 2019-08-23 | End: 2019-08-24

## 2019-08-23 RX ORDER — IBUPROFEN 400 MG/1
800 TABLET, FILM COATED ORAL EVERY 6 HOURS PRN
Status: DISCONTINUED | OUTPATIENT
Start: 2019-08-23 | End: 2019-08-28 | Stop reason: HOSPADM

## 2019-08-23 RX ORDER — NALBUPHINE HYDROCHLORIDE 10 MG/ML
2.5-5 INJECTION, SOLUTION INTRAMUSCULAR; INTRAVENOUS; SUBCUTANEOUS EVERY 6 HOURS PRN
Status: DISCONTINUED | OUTPATIENT
Start: 2019-08-23 | End: 2019-08-23

## 2019-08-23 RX ORDER — HYDROCORTISONE 2.5 %
CREAM (GRAM) TOPICAL 3 TIMES DAILY PRN
Status: DISCONTINUED | OUTPATIENT
Start: 2019-08-23 | End: 2019-08-28 | Stop reason: HOSPADM

## 2019-08-23 RX ORDER — HYDROMORPHONE HYDROCHLORIDE 1 MG/ML
INJECTION, SOLUTION INTRAMUSCULAR; INTRAVENOUS; SUBCUTANEOUS
Status: COMPLETED
Start: 2019-08-23 | End: 2019-08-23

## 2019-08-23 RX ORDER — DILTIAZEM HYDROCHLORIDE 5 MG/ML
20 INJECTION INTRAVENOUS ONCE
Status: DISCONTINUED | OUTPATIENT
Start: 2019-08-23 | End: 2019-08-23

## 2019-08-23 RX ORDER — POTASSIUM CHLORIDE 1.5 G/1.58G
20-40 POWDER, FOR SOLUTION ORAL
Status: DISCONTINUED | OUTPATIENT
Start: 2019-08-23 | End: 2019-08-23

## 2019-08-23 RX ORDER — POTASSIUM CHLORIDE 1500 MG/1
20-40 TABLET, EXTENDED RELEASE ORAL
Status: DISCONTINUED | OUTPATIENT
Start: 2019-08-23 | End: 2019-08-23

## 2019-08-23 RX ORDER — ONDANSETRON 2 MG/ML
4 INJECTION INTRAMUSCULAR; INTRAVENOUS EVERY 30 MIN PRN
Status: DISCONTINUED | OUTPATIENT
Start: 2019-08-23 | End: 2019-08-23 | Stop reason: HOSPADM

## 2019-08-23 RX ORDER — METOPROLOL TARTRATE 1 MG/ML
1-2 INJECTION, SOLUTION INTRAVENOUS EVERY 5 MIN PRN
Status: DISCONTINUED | OUTPATIENT
Start: 2019-08-23 | End: 2019-08-23 | Stop reason: HOSPADM

## 2019-08-23 RX ORDER — LIDOCAINE HYDROCHLORIDE 20 MG/ML
INJECTION, SOLUTION INFILTRATION; PERINEURAL PRN
Status: DISCONTINUED | OUTPATIENT
Start: 2019-08-23 | End: 2019-08-23 | Stop reason: HOSPADM

## 2019-08-23 RX ORDER — ACETAMINOPHEN 325 MG/1
650 TABLET ORAL EVERY 4 HOURS PRN
Status: DISCONTINUED | OUTPATIENT
Start: 2019-08-26 | End: 2019-08-28 | Stop reason: HOSPADM

## 2019-08-23 RX ORDER — KETOROLAC TROMETHAMINE 30 MG/ML
30 INJECTION, SOLUTION INTRAMUSCULAR; INTRAVENOUS EVERY 6 HOURS
Status: DISPENSED | OUTPATIENT
Start: 2019-08-23 | End: 2019-08-24

## 2019-08-23 RX ORDER — POTASSIUM CL/LIDO/0.9 % NACL 10MEQ/0.1L
10 INTRAVENOUS SOLUTION, PIGGYBACK (ML) INTRAVENOUS
Status: DISCONTINUED | OUTPATIENT
Start: 2019-08-23 | End: 2019-08-23

## 2019-08-23 RX ORDER — NALOXONE HYDROCHLORIDE 0.4 MG/ML
.1-.4 INJECTION, SOLUTION INTRAMUSCULAR; INTRAVENOUS; SUBCUTANEOUS
Status: DISCONTINUED | OUTPATIENT
Start: 2019-08-23 | End: 2019-08-23

## 2019-08-23 RX ORDER — DILTIAZEM HYDROCHLORIDE 5 MG/ML
10 INJECTION INTRAVENOUS ONCE
Status: COMPLETED | OUTPATIENT
Start: 2019-08-23 | End: 2019-08-23

## 2019-08-23 RX ORDER — ROPIVACAINE HYDROCHLORIDE 2 MG/ML
10 INJECTION, SOLUTION EPIDURAL; INFILTRATION; PERINEURAL ONCE
Status: COMPLETED | OUTPATIENT
Start: 2019-08-23 | End: 2019-08-23

## 2019-08-23 RX ORDER — CITRIC ACID/SODIUM CITRATE 334-500MG
SOLUTION, ORAL ORAL
Status: COMPLETED
Start: 2019-08-23 | End: 2019-08-23

## 2019-08-23 RX ORDER — LANOLIN 100 %
OINTMENT (GRAM) TOPICAL
Status: DISCONTINUED | OUTPATIENT
Start: 2019-08-23 | End: 2019-08-28 | Stop reason: HOSPADM

## 2019-08-23 RX ORDER — POTASSIUM CHLORIDE 7.45 MG/ML
10 INJECTION INTRAVENOUS
Status: DISCONTINUED | OUTPATIENT
Start: 2019-08-23 | End: 2019-08-23

## 2019-08-23 RX ORDER — MORPHINE SULFATE 1 MG/ML
INJECTION, SOLUTION EPIDURAL; INTRATHECAL; INTRAVENOUS PRN
Status: DISCONTINUED | OUTPATIENT
Start: 2019-08-23 | End: 2019-08-23

## 2019-08-23 RX ORDER — HYDROMORPHONE HYDROCHLORIDE 1 MG/ML
INJECTION, SOLUTION INTRAMUSCULAR; INTRAVENOUS; SUBCUTANEOUS
Status: DISCONTINUED
Start: 2019-08-23 | End: 2019-08-23 | Stop reason: HOSPADM

## 2019-08-23 RX ORDER — HYDROMORPHONE HYDROCHLORIDE 1 MG/ML
.3-.5 INJECTION, SOLUTION INTRAMUSCULAR; INTRAVENOUS; SUBCUTANEOUS EVERY 30 MIN PRN
Status: DISCONTINUED | OUTPATIENT
Start: 2019-08-23 | End: 2019-08-24

## 2019-08-23 RX ORDER — FENTANYL CITRATE 50 UG/ML
25-50 INJECTION, SOLUTION INTRAMUSCULAR; INTRAVENOUS
Status: DISCONTINUED | OUTPATIENT
Start: 2019-08-23 | End: 2019-08-23 | Stop reason: HOSPADM

## 2019-08-23 RX ORDER — SODIUM CHLORIDE, SODIUM LACTATE, POTASSIUM CHLORIDE, CALCIUM CHLORIDE 600; 310; 30; 20 MG/100ML; MG/100ML; MG/100ML; MG/100ML
INJECTION, SOLUTION INTRAVENOUS CONTINUOUS PRN
Status: DISCONTINUED | OUTPATIENT
Start: 2019-08-23 | End: 2019-08-23

## 2019-08-23 RX ORDER — ACETAMINOPHEN 325 MG/1
650 TABLET ORAL EVERY 4 HOURS PRN
Status: DISCONTINUED | OUTPATIENT
Start: 2019-08-23 | End: 2019-08-23

## 2019-08-23 RX ORDER — SODIUM CHLORIDE, SODIUM LACTATE, POTASSIUM CHLORIDE, CALCIUM CHLORIDE 600; 310; 30; 20 MG/100ML; MG/100ML; MG/100ML; MG/100ML
INJECTION, SOLUTION INTRAVENOUS CONTINUOUS
Status: DISCONTINUED | OUTPATIENT
Start: 2019-08-23 | End: 2019-08-23

## 2019-08-23 RX ORDER — CEFAZOLIN SODIUM 1 G/3ML
INJECTION, POWDER, FOR SOLUTION INTRAMUSCULAR; INTRAVENOUS PRN
Status: DISCONTINUED | OUTPATIENT
Start: 2019-08-23 | End: 2019-08-23

## 2019-08-23 RX ORDER — ACETAMINOPHEN 325 MG/1
TABLET ORAL
Status: DISCONTINUED
Start: 2019-08-23 | End: 2019-08-23 | Stop reason: HOSPADM

## 2019-08-23 RX ORDER — MAGNESIUM SULFATE HEPTAHYDRATE 40 MG/ML
2 INJECTION, SOLUTION INTRAVENOUS DAILY PRN
Status: DISCONTINUED | OUTPATIENT
Start: 2019-08-23 | End: 2019-08-23

## 2019-08-23 RX ORDER — OXYTOCIN/0.9 % SODIUM CHLORIDE 30/500 ML
PLASTIC BAG, INJECTION (ML) INTRAVENOUS
Status: DISCONTINUED
Start: 2019-08-23 | End: 2019-08-23 | Stop reason: HOSPADM

## 2019-08-23 RX ORDER — SIMETHICONE 80 MG
80 TABLET,CHEWABLE ORAL 4 TIMES DAILY PRN
Status: DISCONTINUED | OUTPATIENT
Start: 2019-08-23 | End: 2019-08-28 | Stop reason: HOSPADM

## 2019-08-23 RX ORDER — ONDANSETRON 2 MG/ML
4 INJECTION INTRAMUSCULAR; INTRAVENOUS EVERY 6 HOURS PRN
Status: DISCONTINUED | OUTPATIENT
Start: 2019-08-23 | End: 2019-08-24

## 2019-08-23 RX ORDER — POTASSIUM CHLORIDE 29.8 MG/ML
20 INJECTION INTRAVENOUS
Status: DISCONTINUED | OUTPATIENT
Start: 2019-08-23 | End: 2019-08-23

## 2019-08-23 RX ORDER — CEFAZOLIN SODIUM 1 G/50ML
SOLUTION INTRAVENOUS
Status: DISCONTINUED
Start: 2019-08-23 | End: 2019-08-23 | Stop reason: HOSPADM

## 2019-08-23 RX ORDER — EPHEDRINE SULFATE 50 MG/ML
5 INJECTION, SOLUTION INTRAMUSCULAR; INTRAVENOUS; SUBCUTANEOUS
Status: DISCONTINUED | OUTPATIENT
Start: 2019-08-23 | End: 2019-08-23

## 2019-08-23 RX ORDER — LIDOCAINE 40 MG/G
CREAM TOPICAL
Status: DISCONTINUED | OUTPATIENT
Start: 2019-08-23 | End: 2019-08-23

## 2019-08-23 RX ORDER — OXYTOCIN 10 [USP'U]/ML
10 INJECTION, SOLUTION INTRAMUSCULAR; INTRAVENOUS
Status: DISCONTINUED | OUTPATIENT
Start: 2019-08-23 | End: 2019-08-28 | Stop reason: HOSPADM

## 2019-08-23 RX ORDER — FENTANYL CITRATE 50 UG/ML
INJECTION, SOLUTION INTRAMUSCULAR; INTRAVENOUS PRN
Status: DISCONTINUED | OUTPATIENT
Start: 2019-08-23 | End: 2019-08-24

## 2019-08-23 RX ORDER — PROPOFOL 10 MG/ML
INJECTION, EMULSION INTRAVENOUS PRN
Status: DISCONTINUED | OUTPATIENT
Start: 2019-08-23 | End: 2019-08-24

## 2019-08-23 RX ORDER — LIDOCAINE HCL/EPINEPHRINE/PF 2%-1:200K
VIAL (ML) INJECTION
Status: DISCONTINUED
Start: 2019-08-23 | End: 2019-08-23 | Stop reason: HOSPADM

## 2019-08-23 RX ORDER — ONDANSETRON 4 MG/1
4 TABLET, ORALLY DISINTEGRATING ORAL EVERY 30 MIN PRN
Status: DISCONTINUED | OUTPATIENT
Start: 2019-08-23 | End: 2019-08-23 | Stop reason: HOSPADM

## 2019-08-23 RX ORDER — DEXAMETHASONE SODIUM PHOSPHATE 4 MG/ML
INJECTION, SOLUTION INTRA-ARTICULAR; INTRALESIONAL; INTRAMUSCULAR; INTRAVENOUS; SOFT TISSUE PRN
Status: DISCONTINUED | OUTPATIENT
Start: 2019-08-23 | End: 2019-08-24

## 2019-08-23 RX ORDER — NALOXONE HYDROCHLORIDE 0.4 MG/ML
.1-.4 INJECTION, SOLUTION INTRAMUSCULAR; INTRAVENOUS; SUBCUTANEOUS
Status: DISCONTINUED | OUTPATIENT
Start: 2019-08-23 | End: 2019-08-24

## 2019-08-23 RX ORDER — SODIUM CHLORIDE, SODIUM LACTATE, POTASSIUM CHLORIDE, CALCIUM CHLORIDE 600; 310; 30; 20 MG/100ML; MG/100ML; MG/100ML; MG/100ML
INJECTION, SOLUTION INTRAVENOUS CONTINUOUS
Status: DISCONTINUED | OUTPATIENT
Start: 2019-08-23 | End: 2019-08-23 | Stop reason: HOSPADM

## 2019-08-23 RX ORDER — LIDOCAINE 40 MG/G
CREAM TOPICAL
Status: DISCONTINUED | OUTPATIENT
Start: 2019-08-23 | End: 2019-08-28 | Stop reason: HOSPADM

## 2019-08-23 RX ORDER — OXYTOCIN/0.9 % SODIUM CHLORIDE 30/500 ML
100 PLASTIC BAG, INJECTION (ML) INTRAVENOUS CONTINUOUS
Status: DISCONTINUED | OUTPATIENT
Start: 2019-08-23 | End: 2019-08-28 | Stop reason: HOSPADM

## 2019-08-23 RX ORDER — LIDOCAINE HYDROCHLORIDE 10 MG/ML
INJECTION, SOLUTION INFILTRATION; PERINEURAL
Status: DISCONTINUED
Start: 2019-08-23 | End: 2019-08-23 | Stop reason: WASHOUT

## 2019-08-23 RX ORDER — KETOROLAC TROMETHAMINE 30 MG/ML
INJECTION, SOLUTION INTRAMUSCULAR; INTRAVENOUS
Status: DISCONTINUED
Start: 2019-08-23 | End: 2019-08-23 | Stop reason: HOSPADM

## 2019-08-23 RX ORDER — OXYCODONE HYDROCHLORIDE 5 MG/1
5-10 TABLET ORAL
Status: DISCONTINUED | OUTPATIENT
Start: 2019-08-23 | End: 2019-08-28 | Stop reason: HOSPADM

## 2019-08-23 RX ORDER — AZITHROMYCIN 500 MG/1
INJECTION, POWDER, LYOPHILIZED, FOR SOLUTION INTRAVENOUS
Status: COMPLETED
Start: 2019-08-23 | End: 2019-08-23

## 2019-08-23 RX ORDER — MAGNESIUM SULFATE HEPTAHYDRATE 40 MG/ML
4 INJECTION, SOLUTION INTRAVENOUS ONCE
Status: COMPLETED | OUTPATIENT
Start: 2019-08-23 | End: 2019-08-23

## 2019-08-23 RX ORDER — IBUPROFEN 400 MG/1
800 TABLET, FILM COATED ORAL
Status: DISCONTINUED | OUTPATIENT
Start: 2019-08-23 | End: 2019-08-23

## 2019-08-23 RX ORDER — AMOXICILLIN 250 MG
1 CAPSULE ORAL 2 TIMES DAILY PRN
Status: DISCONTINUED | OUTPATIENT
Start: 2019-08-23 | End: 2019-08-28 | Stop reason: HOSPADM

## 2019-08-23 RX ORDER — ACETAMINOPHEN 325 MG/1
975 TABLET ORAL EVERY 8 HOURS
Status: DISPENSED | OUTPATIENT
Start: 2019-08-23 | End: 2019-08-26

## 2019-08-23 RX ORDER — LIDOCAINE HCL/EPINEPHRINE/PF 2%-1:200K
VIAL (ML) INJECTION PRN
Status: DISCONTINUED | OUTPATIENT
Start: 2019-08-23 | End: 2019-08-23

## 2019-08-23 RX ORDER — EPHEDRINE SULFATE 50 MG/ML
INJECTION, SOLUTION INTRAMUSCULAR; INTRAVENOUS; SUBCUTANEOUS PRN
Status: DISCONTINUED | OUTPATIENT
Start: 2019-08-23 | End: 2019-08-24

## 2019-08-23 RX ORDER — DEXTROSE, SODIUM CHLORIDE, SODIUM LACTATE, POTASSIUM CHLORIDE, AND CALCIUM CHLORIDE 5; .6; .31; .03; .02 G/100ML; G/100ML; G/100ML; G/100ML; G/100ML
INJECTION, SOLUTION INTRAVENOUS CONTINUOUS
Status: DISCONTINUED | OUTPATIENT
Start: 2019-08-23 | End: 2019-08-24

## 2019-08-23 RX ORDER — OXYTOCIN 10 [USP'U]/ML
10 INJECTION, SOLUTION INTRAMUSCULAR; INTRAVENOUS
Status: DISCONTINUED | OUTPATIENT
Start: 2019-08-23 | End: 2019-08-23

## 2019-08-23 RX ORDER — LIDOCAINE HYDROCHLORIDE 20 MG/ML
INJECTION, SOLUTION INFILTRATION; PERINEURAL PRN
Status: DISCONTINUED | OUTPATIENT
Start: 2019-08-23 | End: 2019-08-24

## 2019-08-23 RX ORDER — VECURONIUM BROMIDE 1 MG/ML
INJECTION, POWDER, LYOPHILIZED, FOR SOLUTION INTRAVENOUS PRN
Status: DISCONTINUED | OUTPATIENT
Start: 2019-08-23 | End: 2019-08-24

## 2019-08-23 RX ORDER — MAGNESIUM SULFATE HEPTAHYDRATE 40 MG/ML
4 INJECTION, SOLUTION INTRAVENOUS EVERY 4 HOURS PRN
Status: DISCONTINUED | OUTPATIENT
Start: 2019-08-23 | End: 2019-08-23

## 2019-08-23 RX ORDER — MORPHINE SULFATE 1 MG/ML
INJECTION, SOLUTION EPIDURAL; INTRATHECAL; INTRAVENOUS
Status: COMPLETED
Start: 2019-08-23 | End: 2019-08-23

## 2019-08-23 RX ORDER — ALBUMIN, HUMAN INJ 5% 5 %
SOLUTION INTRAVENOUS CONTINUOUS PRN
Status: DISCONTINUED | OUTPATIENT
Start: 2019-08-23 | End: 2019-08-24

## 2019-08-23 RX ORDER — ONDANSETRON 2 MG/ML
INJECTION INTRAMUSCULAR; INTRAVENOUS PRN
Status: DISCONTINUED | OUTPATIENT
Start: 2019-08-23 | End: 2019-08-23

## 2019-08-23 RX ORDER — OXYTOCIN/0.9 % SODIUM CHLORIDE 30/500 ML
100-340 PLASTIC BAG, INJECTION (ML) INTRAVENOUS CONTINUOUS PRN
Status: COMPLETED | OUTPATIENT
Start: 2019-08-23 | End: 2019-08-23

## 2019-08-23 RX ORDER — AMOXICILLIN 250 MG
2 CAPSULE ORAL 2 TIMES DAILY PRN
Status: DISCONTINUED | OUTPATIENT
Start: 2019-08-23 | End: 2019-08-28 | Stop reason: HOSPADM

## 2019-08-23 RX ADMIN — SODIUM CHLORIDE, POTASSIUM CHLORIDE, SODIUM LACTATE AND CALCIUM CHLORIDE 1000 ML: 600; 310; 30; 20 INJECTION, SOLUTION INTRAVENOUS at 17:34

## 2019-08-23 RX ADMIN — MORPHINE SULFATE 4 MG: 1 INJECTION, SOLUTION EPIDURAL; INTRATHECAL; INTRAVENOUS at 18:35

## 2019-08-23 RX ADMIN — SODIUM CHLORIDE, POTASSIUM CHLORIDE, SODIUM LACTATE AND CALCIUM CHLORIDE: 600; 310; 30; 20 INJECTION, SOLUTION INTRAVENOUS at 18:17

## 2019-08-23 RX ADMIN — MIDAZOLAM 2 MG: 1 INJECTION INTRAMUSCULAR; INTRAVENOUS at 21:23

## 2019-08-23 RX ADMIN — SODIUM CHLORIDE, POTASSIUM CHLORIDE, SODIUM LACTATE AND CALCIUM CHLORIDE: 600; 310; 30; 20 INJECTION, SOLUTION INTRAVENOUS at 06:11

## 2019-08-23 RX ADMIN — Medication 10 MEQ: at 10:41

## 2019-08-23 RX ADMIN — VECURONIUM BROMIDE 2 MG: 1 INJECTION, POWDER, LYOPHILIZED, FOR SOLUTION INTRAVENOUS at 23:09

## 2019-08-23 RX ADMIN — PHENYLEPHRINE HYDROCHLORIDE 100 MCG: 10 INJECTION INTRAVENOUS at 21:53

## 2019-08-23 RX ADMIN — PROPOFOL 200 MG: 10 INJECTION, EMULSION INTRAVENOUS at 21:28

## 2019-08-23 RX ADMIN — ROCURONIUM BROMIDE 30 MG: 10 INJECTION INTRAVENOUS at 21:51

## 2019-08-23 RX ADMIN — PHENYLEPHRINE HYDROCHLORIDE 100 MCG: 10 INJECTION INTRAVENOUS at 23:52

## 2019-08-23 RX ADMIN — PHENYLEPHRINE HYDROCHLORIDE 100 MCG: 10 INJECTION INTRAVENOUS at 21:49

## 2019-08-23 RX ADMIN — Medication 10 MEQ: at 11:36

## 2019-08-23 RX ADMIN — CEFAZOLIN 2 G: 1 INJECTION, POWDER, FOR SOLUTION INTRAMUSCULAR; INTRAVENOUS at 18:14

## 2019-08-23 RX ADMIN — HYDROMORPHONE HYDROCHLORIDE 0.5 MG: 1 INJECTION, SOLUTION INTRAMUSCULAR; INTRAVENOUS; SUBCUTANEOUS at 19:47

## 2019-08-23 RX ADMIN — Medication 5 MG: at 23:31

## 2019-08-23 RX ADMIN — Medication 5 MG: at 21:45

## 2019-08-23 RX ADMIN — PHENYLEPHRINE HYDROCHLORIDE 200 MCG: 10 INJECTION INTRAVENOUS at 18:49

## 2019-08-23 RX ADMIN — Medication 10 MEQ: at 08:43

## 2019-08-23 RX ADMIN — SODIUM CHLORIDE, POTASSIUM CHLORIDE, SODIUM LACTATE AND CALCIUM CHLORIDE: 600; 310; 30; 20 INJECTION, SOLUTION INTRAVENOUS at 11:31

## 2019-08-23 RX ADMIN — Medication 5 MG: at 22:20

## 2019-08-23 RX ADMIN — Medication 2 MILLI-UNITS/MIN: at 14:34

## 2019-08-23 RX ADMIN — LIDOCAINE HYDROCHLORIDE 5 ML: 20 INJECTION, SOLUTION INFILTRATION; PERINEURAL at 05:21

## 2019-08-23 RX ADMIN — PHENYLEPHRINE HYDROCHLORIDE 0.5 MCG: 10 INJECTION INTRAVENOUS at 18:06

## 2019-08-23 RX ADMIN — SODIUM CHLORIDE, POTASSIUM CHLORIDE, SODIUM LACTATE AND CALCIUM CHLORIDE 1000 ML: 600; 310; 30; 20 INJECTION, SOLUTION INTRAVENOUS at 04:07

## 2019-08-23 RX ADMIN — Medication 5 MG: at 22:15

## 2019-08-23 RX ADMIN — Medication 100 ML/HR: at 21:23

## 2019-08-23 RX ADMIN — PHENYLEPHRINE HYDROCHLORIDE 100 MCG: 10 INJECTION INTRAVENOUS at 18:42

## 2019-08-23 RX ADMIN — HYDROMORPHONE HYDROCHLORIDE 0.5 MG: 1 INJECTION, SOLUTION INTRAMUSCULAR; INTRAVENOUS; SUBCUTANEOUS at 20:31

## 2019-08-23 RX ADMIN — SODIUM CHLORIDE, POTASSIUM CHLORIDE, SODIUM LACTATE AND CALCIUM CHLORIDE: 600; 310; 30; 20 INJECTION, SOLUTION INTRAVENOUS at 21:42

## 2019-08-23 RX ADMIN — Medication 5 MG: at 22:00

## 2019-08-23 RX ADMIN — PHENYLEPHRINE HYDROCHLORIDE 200 MCG: 10 INJECTION INTRAVENOUS at 22:00

## 2019-08-23 RX ADMIN — PHENYLEPHRINE HYDROCHLORIDE 100 MCG: 10 INJECTION INTRAVENOUS at 22:20

## 2019-08-23 RX ADMIN — Medication 2 G: at 21:49

## 2019-08-23 RX ADMIN — LIDOCAINE HYDROCHLORIDE,EPINEPHRINE BITARTRATE 5 ML: 20; .005 INJECTION, SOLUTION EPIDURAL; INFILTRATION; INTRACAUDAL; PERINEURAL at 18:05

## 2019-08-23 RX ADMIN — ONDANSETRON 4 MG: 2 INJECTION INTRAMUSCULAR; INTRAVENOUS at 18:17

## 2019-08-23 RX ADMIN — PHENYLEPHRINE HYDROCHLORIDE 100 MCG: 10 INJECTION INTRAVENOUS at 23:31

## 2019-08-23 RX ADMIN — LIDOCAINE HYDROCHLORIDE 60 MG: 20 INJECTION, SOLUTION INFILTRATION; PERINEURAL at 21:28

## 2019-08-23 RX ADMIN — DILTIAZEM HYDROCHLORIDE 10 MG: 5 INJECTION INTRAVENOUS at 04:47

## 2019-08-23 RX ADMIN — PHENYLEPHRINE HYDROCHLORIDE 100 MCG: 10 INJECTION INTRAVENOUS at 21:28

## 2019-08-23 RX ADMIN — Medication 10 MEQ: at 09:58

## 2019-08-23 RX ADMIN — PHENYLEPHRINE HYDROCHLORIDE 100 MCG: 10 INJECTION INTRAVENOUS at 23:56

## 2019-08-23 RX ADMIN — SUCCINYLCHOLINE CHLORIDE 80 MG: 20 INJECTION, SOLUTION INTRAMUSCULAR; INTRAVENOUS; PARENTERAL at 21:28

## 2019-08-23 RX ADMIN — PHENYLEPHRINE HYDROCHLORIDE 0.25 MCG/KG/MIN: 10 INJECTION INTRAVENOUS at 21:50

## 2019-08-23 RX ADMIN — MAGNESIUM SULFATE IN WATER 4 G: 40 INJECTION, SOLUTION INTRAVENOUS at 08:19

## 2019-08-23 RX ADMIN — Medication 5 MG: at 23:56

## 2019-08-23 RX ADMIN — SODIUM CHLORIDE, POTASSIUM CHLORIDE, SODIUM LACTATE AND CALCIUM CHLORIDE: 600; 310; 30; 20 INJECTION, SOLUTION INTRAVENOUS at 21:23

## 2019-08-23 RX ADMIN — PHENYLEPHRINE HYDROCHLORIDE 100 MCG: 10 INJECTION INTRAVENOUS at 21:33

## 2019-08-23 RX ADMIN — Medication 5 MG: at 21:49

## 2019-08-23 RX ADMIN — LIDOCAINE HYDROCHLORIDE,EPINEPHRINE BITARTRATE 3 ML: 20; .005 INJECTION, SOLUTION EPIDURAL; INFILTRATION; INTRACAUDAL; PERINEURAL at 18:13

## 2019-08-23 RX ADMIN — KETOROLAC TROMETHAMINE 30 MG: 30 INJECTION, SOLUTION INTRAMUSCULAR at 19:47

## 2019-08-23 RX ADMIN — Medication 5 MG: at 06:13

## 2019-08-23 RX ADMIN — FENTANYL CITRATE 100 MCG: 50 INJECTION, SOLUTION INTRAMUSCULAR; INTRAVENOUS at 05:29

## 2019-08-23 RX ADMIN — PHENYLEPHRINE HYDROCHLORIDE 200 MCG: 10 INJECTION INTRAVENOUS at 21:45

## 2019-08-23 RX ADMIN — PHENYLEPHRINE HYDROCHLORIDE 100 MCG: 10 INJECTION INTRAVENOUS at 21:36

## 2019-08-23 RX ADMIN — PHENYLEPHRINE HYDROCHLORIDE 100 MCG: 10 INJECTION INTRAVENOUS at 18:15

## 2019-08-23 RX ADMIN — ALBUMIN HUMAN: 0.05 INJECTION, SOLUTION INTRAVENOUS at 21:37

## 2019-08-23 RX ADMIN — ROPIVACAINE HYDROCHLORIDE 9 ML: 2 INJECTION, SOLUTION EPIDURAL; INFILTRATION at 05:29

## 2019-08-23 RX ADMIN — PHENYLEPHRINE HYDROCHLORIDE 100 MCG: 10 INJECTION INTRAVENOUS at 22:15

## 2019-08-23 RX ADMIN — Medication 5 MG: at 23:52

## 2019-08-23 RX ADMIN — SODIUM CITRATE AND CITRIC ACID MONOHYDRATE 30 ML: 500; 334 SOLUTION ORAL at 17:56

## 2019-08-23 RX ADMIN — FENTANYL CITRATE 50 MCG: 50 INJECTION, SOLUTION INTRAMUSCULAR; INTRAVENOUS at 21:28

## 2019-08-23 RX ADMIN — Medication 5 MG: at 21:53

## 2019-08-23 RX ADMIN — DEXAMETHASONE SODIUM PHOSPHATE 4 MG: 4 INJECTION, SOLUTION INTRA-ARTICULAR; INTRALESIONAL; INTRAMUSCULAR; INTRAVENOUS; SOFT TISSUE at 21:56

## 2019-08-23 RX ADMIN — ALBUMIN HUMAN: 0.05 INJECTION, SOLUTION INTRAVENOUS at 21:50

## 2019-08-23 RX ADMIN — AZITHROMYCIN MONOHYDRATE 500 MG: 500 INJECTION, POWDER, LYOPHILIZED, FOR SOLUTION INTRAVENOUS at 17:43

## 2019-08-23 RX ADMIN — ROCURONIUM BROMIDE 20 MG: 10 INJECTION INTRAVENOUS at 22:34

## 2019-08-23 RX ADMIN — Medication 12 ML/HR: at 05:30

## 2019-08-23 RX ADMIN — PHENYLEPHRINE HYDROCHLORIDE 100 MCG: 10 INJECTION INTRAVENOUS at 21:39

## 2019-08-23 RX ADMIN — Medication 340 ML/HR: at 18:31

## 2019-08-23 RX ADMIN — SODIUM CHLORIDE, POTASSIUM CHLORIDE, SODIUM LACTATE AND CALCIUM CHLORIDE: 600; 310; 30; 20 INJECTION, SOLUTION INTRAVENOUS at 23:39

## 2019-08-23 RX ADMIN — PHENYLEPHRINE HYDROCHLORIDE 200 MCG: 10 INJECTION INTRAVENOUS at 18:47

## 2019-08-23 RX ADMIN — LIDOCAINE HYDROCHLORIDE,EPINEPHRINE BITARTRATE 5 ML: 20; .005 INJECTION, SOLUTION EPIDURAL; INFILTRATION; INTRACAUDAL; PERINEURAL at 18:10

## 2019-08-23 RX ADMIN — SODIUM CHLORIDE, POTASSIUM CHLORIDE, SODIUM LACTATE AND CALCIUM CHLORIDE: 600; 310; 30; 20 INJECTION, SOLUTION INTRAVENOUS at 18:47

## 2019-08-23 RX ADMIN — LIDOCAINE HYDROCHLORIDE,EPINEPHRINE BITARTRATE 5 ML: 20; .005 INJECTION, SOLUTION EPIDURAL; INFILTRATION; INTRACAUDAL; PERINEURAL at 18:07

## 2019-08-23 ASSESSMENT — ENCOUNTER SYMPTOMS
DYSRHYTHMIAS: 1
DYSRHYTHMIAS: 1

## 2019-08-23 ASSESSMENT — MIFFLIN-ST. JEOR: SCORE: 1503.07

## 2019-08-23 NOTE — PROVIDER NOTIFICATION
08/23/19 0320   Provider Notification   Provider Name/Title Dr. Luna   Method of Notification Phone   Request Evaluate - Remote   Notification Reason SVE;Status Update;Maternal Vital Sign Change  (reviewed maternal pulse)       Dr. Luna updated on pt status, FHT's, contraction pattern, SVE, maternal pulse reviewed.  Pt has felt slightly faint since present to [facility], but had denies SOB.  Recheck cervix  Hour from initial check and update on pt status.

## 2019-08-23 NOTE — ANESTHESIA PREPROCEDURE EVALUATION
Anesthesia Pre-Procedure Evaluation    Patient: Debbi Grossman   MRN: [id number] : 1985          Preoperative Diagnosis: pregnancy-failure to descend    Procedure(s):   SECTION    Past Medical History:   Diagnosis Date     CP (cerebral palsy) (H)      Seasonal allergies      Past Surgical History:   Procedure Laterality Date     HC REMOVE TONSILS/ADENOIDS,<11 Y/O       HC TOOTH EXTRACTION W/FORCEP      [employer] Te[facility]       Anesthesia Evaluation     .             ROS/MED HX    ENT/Pulmonary:  - neg pulmonary ROS   (+)allergic rhinitis, , . .    Neurologic:     (+)other neuro CP hx    Cardiovascular: Comment: On admission new onset A Fib with RVR, cardiology consulted, Echo showed normal structure and function of the heart, patient was started on diltiazem gtt for rate control, she spontaneously convert back to NSR, diltiazem gtt stopped by cardiology.    (+) ----. : . . . :. dysrhythmias a-fib, .       METS/Exercise Tolerance:     Hematologic:         Musculoskeletal:         GI/Hepatic:         Renal/Genitourinary:         Endo:         Psychiatric:         Infectious Disease:         Malignancy:         Other:                          Physical Exam  Normal systems: pulmonary and dental    Airway   Mallampati: II  TM distance: >3 FB  Neck ROM: full    Dental     Cardiovascular   Rhythm and rate: regular and normal      Pulmonary             Lab Results   Component Value Date    WBC 13.8 (H) 2019    HGB 14.2 2019    HCT 39.4 2019     2019    SED 6 2012     2019    POTASSIUM 4.1 2019    CHLORIDE 108 2019    CO2 20 2019    BUN 9 2019    CR 0.54 2019     (H) 2019    MITZI 8.5 2019    PHOS 2.8 2019    MAG 2.3 2019    ALBUMIN 2.8 (L) 2019    PROTTOTAL 6.2 (L) 2019    ALT 18 2019    AST 19 2019    ALKPHOS 134 2019    BILITOTAL 0.4 2019    TSH 4.52 (H) 2019    T4  "1.12 08/23/2019       Preop Vitals  BP Readings from Last 3 Encounters:   08/23/19 107/69   08/20/19 114/70   08/13/19 112/60    Pulse Readings from Last 3 Encounters:   01/29/19 68   03/29/18 86   04/26/17 91      Resp Readings from Last 3 Encounters:   08/23/19 16   06/16/14 14   08/02/12 16    SpO2 Readings from Last 3 Encounters:   08/23/19 98%   03/29/18 98%   04/26/17 97%      Temp Readings from Last 1 Encounters:   08/23/19 36.7  C (98  F) (Oral)    Ht Readings from Last 1 Encounters:   08/23/19 1.765 m (5' 9.5\")      Wt Readings from Last 1 Encounters:   08/23/19 72.6 kg (160 lb)    Estimated body mass index is 23.29 kg/m  as calculated from the following:    Height as of this encounter: 1.765 m (5' 9.5\").    Weight as of this encounter: 72.6 kg (160 lb).     Allergies   Allergen Reactions     Seasonal Allergies      Social History     Tobacco Use     Smoking status: Never Smoker     Smokeless tobacco: Never Used   Substance Use Topics     Alcohol use: No     Frequency: Never     Comment: Less than once a month     Prior to Admission medications    Medication Sig Start Date End Date Taking? Authorizing Provider   adapalene (DIFFERIN) 0.1 % gel Apply  topically At Bedtime.Need annual office visit 3/29/18  Yes Marixa Roche PA-C   benzoyl peroxide-erythromycin (BENZAMYCIN) topical gel Apply twice daily 3/29/18  Yes Marixa Roche PA-C   Docosahexaenoic Acid (DHA PO)    Yes Reported, Patient   mometasone (NASONEX) 50 MCG/ACT spray Spray 2 sprays into both nostrils daily 3/29/18  Yes Marixa Roche PA-C   Prenatal Multivit-Min-Fe-FA (PRENATAL VITAMINS PO) Take 1 tablet by mouth   Yes Reported, Patient     Current Facility-Administered Medications Ordered in Epic   Medication Dose Route Frequency Last Rate Last Dose     acetaminophen (TYLENOL) tablet 650 mg  650 mg Oral Q4H PRN         ceFAZolin (ANCEF) 2 G infusion             ePHEDrine injection 5 mg  5 mg Intravenous Q3 Min PRN   5 mg at " "08/23/19 0613     fentaNYL (SUBLIMAZE) 2 mcg/mL, ropivacaine (NAROPIN) 0.2% in NaCl 0.9% EPIDURAL infusion   EPIDURAL Continuous 12 mL/hr at 08/23/19 0530 12 mL/hr at 08/23/19 0530     ibuprofen (ADVIL/MOTRIN) tablet 800 mg  800 mg Oral Once PRN         lactated ringers BOLUS 250 mL  250 mL Intravenous Once PRN   Stopped at 08/23/19 1229     lactated ringers BOLUS 500 mL  500 mL Intravenous Once PRN         lactated ringers infusion   Intravenous Continuous 125 mL/hr at 08/23/19 1229       lidocaine (LMX4) cream   Topical Q1H PRN         lidocaine 1 % 0.1-1 mL  0.1-1 mL Other Q1H PRN         lidocaine 1 % 0.1-20 mL  0.1-20 mL Subcutaneous Once PRN         magnesium sulfate 2 g in water intermittent infusion  2 g Intravenous Daily PRN         magnesium sulfate 4 g in 100 mL sterile water (premade)  4 g Intravenous Q4H PRN         medication instruction   Does not apply Continuous PRN         medication instruction   Does not apply Continuous PRN         Medication Instructions: misoprostol (CYTOTEC)- Nurse to discuss ordering with provider, if needed. Ordered via \"OB misoprostol (CYTOTEC) Postpartum Hemorrhage PANEL\"   Does not apply Continuous PRN         nalbuphine (NUBAIN) injection 2.5-5 mg  2.5-5 mg Intravenous Q6H PRN         naloxone (NARCAN) injection 0.1-0.4 mg  0.1-0.4 mg Intravenous Q2 Min PRN         naloxone (NARCAN) injection 0.1-0.4 mg  0.1-0.4 mg Intravenous Q2 Min PRN         ondansetron (ZOFRAN) injection 4 mg  4 mg Intravenous Q6H PRN         Opioid plan postpartum - medication instruction   Does not apply Continuous PRN         oxytocin (PITOCIN) 30 units in 500 mL 0.9% NaCl infusion  100-340 mL/hr Intravenous Continuous PRN   Stopped at 08/23/19 0747     oxytocin (PITOCIN) 30 units in 500 mL 0.9% NaCl infusion  1-24 carlie-units/min Intravenous Continuous   Stopped at 08/23/19 1714     oxytocin (PITOCIN) injection 10 Units  10 Units Intramuscular Once PRN         potassium chloride (KLOR-CON) " Packet 20-40 mEq  20-40 mEq Oral or Feeding Tube Q2H PRN         potassium chloride 10 mEq in 100 mL intermittent infusion with 10 mg lidocaine  10 mEq Intravenous Q1H PRN   10 mEq at 08/23/19 1136     potassium chloride 10 mEq in 100 mL sterile water intermittent infusion (premix)  10 mEq Intravenous Q1H PRN         potassium chloride 20 mEq in 50 mL intermittent infusion  20 mEq Intravenous Q1H PRN         potassium chloride ER (K-DUR/KLOR-CON M) CR tablet 20-40 mEq  20-40 mEq Oral Q2H PRN         sodium chloride (PF) 0.9% PF flush 3 mL  3 mL Intracatheter q1 min prn         sodium chloride (PF) 0.9% PF flush 3 mL  3 mL Intracatheter Q8H         sodium citrate-citric acid (BICITRA) 500-334 MG/5ML solution             No current [facility]-ordered outpatient medications on file.       fentaNYL-ropivacaine 12 mL/hr (08/23/19 0530)     lactated ringers 125 mL/hr at 08/23/19 1229     - MEDICATION INSTRUCTIONS -       - MEDICATION INSTRUCTIONS -       - MEDICATION INSTRUCTIONS -       - MEDICATION INSTRUCTIONS -       oxytocin in 0.9% NaCl Stopped (08/23/19 0747)     oxytocin in 0.9% NaCl Stopped (08/23/19 1714)     Recent Labs   Lab Test 08/23/19  1443 08/23/19  0352 03/29/18  0824  08/21/12  1720   NA  --  139  --   --  140   POTASSIUM 4.1 3.3*  --   --  3.8   CHLORIDE  --  108  --   --  103   CO2  --  20  --   --  23   ANIONGAP  --  11  --   --  13   GLC  --  112* 89   < > 102*   BUN  --  9  --   --  10   CR  --  0.54  --   --  0.65   MITZI  --  8.5  --   --  9.1    < > = values in this interval not displayed.     Recent Labs   Lab Test 08/23/19  0352 05/28/19  0826   WBC 13.8* 8.8   HGB 14.2 11.3*    245     Recent Labs   Lab Test 08/23/19  0352   ABO A   RH Pos     No results for input(s): TROPI in the last 96912 hours.  No results for input(s): PH, PCO2, PO2, HCO3 in the last 24492 hours.  No results for input(s): HCG in the last 85930 hours.  No results found for this or any previous visit (from the past 744  hour(s)).    RECENT LABS:       Anesthesia Plan      History & Physical Review  History and physical reviewed and following examination; no interval change.    ASA Status:  2 .    NPO Status:  > 8 hours    Plan for Epidural   PONV prophylaxis:  Ondansetron (or other 5HT-3)  Epidural for Section      Postoperative Care  Postoperative pain management:  Neuraxial analgesia and Multi-modal analgesia.      Consents  Anesthetic plan, risks, benefits and alternatives discussed with:  Patient..                 Awais Marti MD

## 2019-08-23 NOTE — PROGRESS NOTES
Cardiology consult dictated.  33-year-old healthy patient who is actively in labor noted to be in asymptomatic atrial fibrillation with rapid ventricular response with ventricular rates in the 130s.  No past history of atrial fibrillation or cardiac issues.  Has been started on intravenous diltiazem.  Has an epidural in situ.  Obviously cannot be anticoagulated.  Potassium low at 3.3 and magnesium borderline at 1.6.  Both will be replaced.  Patient is hemodynamically stable.  No symptoms of congestive heart failure.  No symptoms of angina pectoris.  No evidence of preexcitation on EKG.  Awaiting the results of the stat echocardiogram.  Electrophysiologist will be seeing the patient this morning to get expert opinion also.  Hopefully once the hyperadrenergic state of labor is over the heart rate will slow down but I would continue on the intravenous diltiazem his blood pressure and heart rate dictate.  Certainly there will be issues with respect to anticoagulation and restoration of sinus rhythm post partum.  We will follow her very closely.

## 2019-08-23 NOTE — PLAN OF CARE
RN took over on patient after [place] [facility] [place], 12 lead EKG was being preformed and labs were being drawn. Patient displayed lethargy between contractions. 12 lead EKG read atrial fibrillation and a RRT was called.  The RRT team, OB and Hospitalist, who had be notified and called to assess patient earlier, all arrived around the same time.  Portable telemetry was applied, 2 IVs started, and fluid bolus started per orders as well as monitoring of active labor and FHTs as a plan of care was determined.  See MDs notes and MAR for plan of care. Patient was then transferred to room 235 for telemetry monitoring.  As normal labor cares, UCs, and FHTs were continuing to be monitored a member from the RRT remained with the patient to help manage the patient's care plan as well.  When it was determined appropriate the patient received pain control for labor via an epidural.  Shortly after, the patient was alert and oriented x3, in good spirits, and comfortable.

## 2019-08-23 NOTE — CONSULTS
[facility]  Consult Note - Hospitalist Service     Date of Admission:  2019  Consult Requested by: Dr. Luna   Reason for Consult: newly diagnosed atrial fibrillation     Assessment & Plan   Debbi Grossman is a 33 year old female admitted on 2019. She has no significant past medical history and  40 weeks pregnant who presented to labor and delivery with increased lethargy, confusion, active labor, and atrial fibrillation with rapid ventricular response.     #. Atrial fibrillation with rapid ventricular response during pregnancy: patient with borderline hypotension with 12 lead EKG demonstrating atrial fibrillation with rapid ventricular response. It's not clear for how long the patient has been in atrial fibrillation; therefore, will opt more for rate control over rhythm control. It's not clear if patient (or baby) has underlying structural heart disease or if atrial fibrillation being solely driven due to altered hemodynamics due to pregnancy. Will also look for underlying electrolyte derangements, anemia, and/or thyroid disease. Case discussed with on call cardiologist.   -diltiazem drip starting at 5 mg/hour, 10 mg diltiazem bolus   -1L lactated ringers bolus   -telemetry   -TTE ordered for tomorrow am   -defer anticoagulation given that patient actively in labor and wanting to mitigate bleeding  -cardiology consult    -STAT BMP, CBC, and TSH   -would consider  echo - information passed on to OB/GYN to d/w MFM (looking for structural heart disease in infant)     #. Acute encephalopathy: appears to be mild, as the patient able to converse and follow commands. Suspect due to hypoperfusion; however, perhaps due to early sepsis.   -metabolic labs as mentioned above  -UA ordered  -will continue monitoring mentation as we treat the patient's heart rate    #. Active labor: to be managed by OB/GYN. Case already discussed with Dr. Luna.       The patient's care was discussed with  "the Patient. Hospitalist medicine will continue to follow the patient.     Nelson Wolf MD  [facility]    ______________________________________________________________________    Chief Complaint   Atrial fibrillation     History is obtained from the patient, patient's nurse, and patient's significant other     History of Present Illness   Debbi Grossman is a 33 year old female who presented to [facility] and deliver after she started having contractions earlier in the evening. Furthermore, the patient reported feeling more \"woozy\" over the course of the evening. She denied chest pain or shortness of breath. Per the patient's , she had also been slower to answer questions on their way to L&D. When the patient arrived to the floor, she was found to be in atrial fibrillation with rates in the 140s. No nausea, fevers, or chills. Leading up to the evening and early morning, the patient reported feeling pretty well. The patient is  and ~40 weeks pregnant.    Review of Systems   The 10 point Review of Systems is negative other than noted in the HPI or here.     Past Medical History    I have reviewed this patient's medical history and updated it with pertinent information if needed.   Past Medical History:   Diagnosis Date     CP (cerebral palsy) (H)      Seasonal allergies        Past Surgical History   I have reviewed this patient's surgical history and updated it with pertinent information if needed.  Past Surgical History:   Procedure Laterality Date     HC REMOVE TONSILS/ADENOIDS,<11 Y/O       HC TOOTH EXTRACTION W/FORCEP      [place] Teeth       Social History   I have reviewed this patient's social history and updated it with pertinent information if needed.  Social History     Tobacco Use     Smoking status: Never Smoker     Smokeless tobacco: Never Used   Substance Use Topics     Alcohol use: No     Frequency: Never     Comment: Less than once a month     Drug use: No "       Family History   I have reviewed this patient's family history and updated it with pertinent information if needed.   Family History   Problem Relation Age of Onset     Hypertension Father      Diabetes Father      Psychotic Disorder Maternal Grandmother      Prostate Cancer Maternal Grandfather      Cancer - colorectal Maternal Grandfather      Diabetes Maternal Grandfather      Depression Brother         bipolar depression     Neurologic Disorder Brother         epliepsy     Neurologic Disorder Sister      Medications    Medications Prior to Admission   Medication Sig Dispense Refill Last Dose     adapalene (DIFFERIN) 0.1 % gel Apply  topically At Bedtime.Need annual office visit 45 g 3 8/22/2019 at Unknown time     benzoyl peroxide-erythromycin (BENZAMYCIN) topical gel Apply twice daily 45 g 3 8/22/2019 at Unknown time     Docosahexaenoic Acid (DHA PO)    8/22/2019 at Unknown time     mometasone (NASONEX) 50 MCG/ACT spray Spray 2 sprays into both nostrils daily 3 Box 3 8/22/2019 at Unknown time     Prenatal Multivit-Min-Fe-FA (PRENATAL VITAMINS PO) Take 1 tablet by mouth   8/22/2019 at Unknown time       Allergies   Allergies   Allergen Reactions     Seasonal Allergies        Physical Exam   Vital Signs: Temp: 97.5  F (36.4  C) Temp src: Temporal BP: 109/77   Heart Rate: 151   SpO2: 94 % O2 Device: None (Room air)    Weight: 160 lbs 0 oz    Gen: NAD, tired appearing    HEENT: MMM, EOMI   Neck: supple, no posterior/anterior cervical LAD  Lungs: CTAB, no W/R/R, no accessory muscle use   CV: irregularly irregular rhythm w rate in the 140s, no M/G/R, no JVD   Abd: gravid abdomen  Ext: no clubbing/cyanosis/edema  MSK: no joint effusions or tenderness  Skin: no obvious bruising or open wounds   Neuro: AOx3, cranial nerves II-XII in tact, slow to answer questions but following commands       Data   I personally reviewed the EKG tracing showing atrial fibrillation.    ROUTINE LABS (Last four results)  CMP  Recent  Labs   Lab 08/23/19  0352      POTASSIUM 3.3*   CHLORIDE 108   CO2 20   ANIONGAP 11   *   BUN 9   CR 0.54   GFRESTIMATED >90   GFRESTBLACK >90   MITZI 8.5   MAG 1.6   PHOS 2.8   PROTTOTAL 6.2*   ALBUMIN 2.8*   BILITOTAL 0.4   ALKPHOS 134   AST 19   ALT 18     CBC  Recent Labs   Lab 08/23/19  0352   WBC 13.8*   RBC 4.33   HGB 14.2   HCT 39.4   MCV 91   MCH 32.8   MCHC 36.0   RDW 12.9        INRNo lab results found in last 7 days.  Arterial Blood GasNo lab results found in last 7 days.

## 2019-08-23 NOTE — PROGRESS NOTES
[facility]    Medicine Progress Note - Hospitalist Service       Date of Admission:  2019  Assessment & Plan   Debbi Grossman is a 33 year old female admitted on 2019. She has no significant past medical history and  40 weeks pregnant who presented to labor and delivery with increased lethargy, confusion, active labor, and atrial fibrillation with rapid ventricular response.      #. Atrial fibrillation with rapid ventricular response during pregnancy: patient with borderline hypotension with 12 lead EKG demonstrating atrial fibrillation with rapid ventricular response. It's not clear for how long the patient has been in atrial fibrillation; therefore, initial goal has been to opt for rate control over rhythm control.  TTE with EF 55-60%.  Px has recently spontaneously converted to NSR.  Px is asymptomatic.  Elevated TSH however T4 is wnl.  UA negative.  -diltiazem drip ongoing, ultimate goals will be try to transition to PO BB  -1L lactated ringers bolus fluids  - Electrolyte replacement in progress  - telemetry   -defer anticoagulation for now, given that patient actively in labor and wanting to mitigate bleeding; however, it appears can start Lovenox 12-24 hours post delivery, also in light of epidural.    -Ultimately it appears to transition to Warfarin which isn't secreted into breast milk  -cardiology consult continues to follow u  -Follow through deliver     #. Acute encephalopathy: appears to be mild and now almost complete resoved, as the patient able to converse and follow commands.  No clear signs of finection.  UA negative.  -will continue monitoring mentation      #. Active labor: to be managed by OB/GYN.          Diet: Room Service  Clear Liquid Diet    DVT Prophylaxis: None for now  [place] Catheter: in place, indication: Epidural/Intrathecal Catheter  Code Status: Full    Disposition Plan   Expected discharge: 2 - 3 days, recommended to prior living arrangement once  Delivery and post partum occurs..  Entered: Alex Woods MD 08/23/2019, 2:56 PM       The patient's care was discussed with the Bedside Nurse, Patient and Patient's Family.    Alex Woods MD  Hospitalist Service  [facility]    ______________________________________________________________________    Interval History   Doing well now with epidural. Converted to NSR.  Continue with lyte correction.  Otherwise asymptomatic.    Data reviewed today: I reviewed all medications, new labs and imaging results over the last 24 hours. I personally reviewed no images or EKG's today.    Physical Exam   Vital Signs: Temp: 98  F (36.7  C) Temp src: Oral BP: 107/69   Heart Rate: 111 Resp: 16 SpO2: 98 % O2 Device: None (Room air)    Weight: 160 lbs 0 oz  Gen: NAD, tired appearing    HEENT: MMM, EOMI   Neck: supple, no posterior/anterior cervical LAD  Lungs: CTAB, no W/R/R, no accessory muscle use   CV: irregularly irregular rhythm w rate in the 140s, no M/G/R, no JVD   Abd: gravid abdomen  Ext: no clubbing/cyanosis/edema  MSK: no joint effusions or tenderness  Skin: no obvious bruising or open wounds   Neuro: AOx3, cranial nerves II-XII in tact, slow to answer questions but following commands     Data   Recent Labs   Lab 08/23/19  1443 08/23/19  0352   WBC  --  13.8*   HGB  --  14.2   MCV  --  91   PLT  --  223   NA  --  139   POTASSIUM 4.1 3.3*   CHLORIDE  --  108   CO2  --  20   BUN  --  9   CR  --  0.54   ANIONGAP  --  11   MITZI  --  8.5   GLC  --  112*   ALBUMIN  --  2.8*   PROTTOTAL  --  6.2*   BILITOTAL  --  0.4   ALKPHOS  --  134   ALT  --  18   AST  --  19     No results found for this or any previous visit (from the past 24 hour(s)).

## 2019-08-23 NOTE — ANESTHESIA PROCEDURE NOTES
Peripheral nerve/Neuraxial procedure note : epidural catheter  Pre-Procedure  Performed by Kong Steward MD  Location: OB      Pre-Anesthestic Checklist: patient identified, IV checked, risks and benefits discussed, informed consent and pre-op evaluation    Timeout  Correct Patient: Yes   Correct Procedure: Yes   Correct Site: Yes   Correct Laterality: N/A   Correct Position: Yes   Site Marked: N/A   .   Procedure Documentation    .    Procedure:    Epidural catheter.  Insertion Site:L3-4  (midline approach) Injection technique: LORT saline   Local skin infiltrated with mL of 1% lidocaine.  MEGHAN at 4 cm     Patient Prep;mask, sterile gloves, povidone-iodine 7.5% surgical scrub, patient draped.  .  Needle: [employer] needle Needle Gauge: 17.    Needle Length (Inches) 3.5  .   . .  Catheter threaded easily  4 cm epidural space.  8 cm at skin.   .    Assessment/Narrative  Paresthesias: No.  .  .  Aspiration negative for heme or CSF  . Test dose of 3 mL lidocaine 1.5% w/ 1:200,000 epinephrine at. Test dose negative for signs of intravascular, subdural or intrathecal injection. Comments:  [place] MEGHAN at 4 cm.  Catheter threaded easily.  Negative aspiration.  Negative test dose.  No abnormal pain or paresthesia throughout.  Patient tolerated well.

## 2019-08-23 NOTE — TELEPHONE ENCOUNTER
" is calling and states patient is pregnant and NORMAN is 19. Caller states patient has been having contractions that are 2-3 minutes apart for an hour. Caller state wife is feeling pressure and is she needs to push? Triage guidelines recommend to go to labor and delivery. Caller verbalized and understands directives.    Reason for Disposition    Patient sounds very sick or weak to the triager    Additional Information    Negative: Passed out (i.e., lost consciousness, collapsed and was not responding)    Negative: Shock suspected (e.g., cold/pale/clammy skin, too weak to stand, low BP, rapid pulse)    Negative: Difficult to awaken or acting confused (e.g., disoriented, slurred speech)    Negative: [1] SEVERE abdominal pain (e.g., excruciating) AND [2] constant AND [3] present > 1 hour    Negative: Severe bleeding (e.g., continuous red blood from vagina, or large blood clots)    Negative: Umbilical cord hanging out of the vagina (shiny, white, curled appearance, \"like telephone cord\")    Negative: Uncontrollable urge to push (i.e., feels like baby is coming out now)    Negative: Can see baby    Negative: Sounds like a life-threatening emergency to the triager    Negative: Pregnant < 37 weeks (i.e., )    Negative: [1] Uncertain delivery date AND [2] possibly pregnant < 37 weeks (i.e., )    Negative: [1] First baby (primipara) AND [2] contractions < 6 minutes apart  AND [3] present 2 hours    Negative: [1] History of prior delivery (multipara) AND [2] contractions < 10 minutes apart AND [3] present 1 hour    Negative: [1] History of rapid prior delivery AND [2] contractions < 10 minutes apart    Negative: [1] Leakage of fluid from vagina AND [2] green or brown in color    Negative: [1] Leakage of fluid from vagina AND [2] leakage started > 4 hours ago    Negative: Vaginal bleeding or spotting    Negative: Baby moving less today (e.g., kick count < 5 in 1 hour or < 10 in 2 hours)    Negative: " Severe headache or headache that won't go away    Negative: New blurred vision or vision changes    Negative: [1] Leakage of fluid from vagina AND [2] leakage started < 4 hours ago    Negative: Fever > 100.4 F (38.0 C)    Negative: MODERATE-SEVERE abdominal pain    Protocols used: PREGNANCY - LABOR-A-AH

## 2019-08-23 NOTE — PROVIDER NOTIFICATION
08/23/19 0335   Provider Notification   Provider Name/Title Dr. Luna   Method of Notification Phone   Request Evaluate in Person   Notification Reason Status Update         Dr. Luna called updated on pt status.  Pt more dizzy, tired, and  noted pt unable to state day giving her birthday, he states she does not seem like herself.  Unsure if it is labor, sleep deprivation, dehydration, or something else underlying at this time  Cervix 3+cm.  Orders given to admit to labor and delivery.  Get Stat 12 lead EKG and start LR bolus.  Dr. Luna to place other orders and place Stat consult for Hospital list for further evaluation.  Dr. Luna en route to assess pt.

## 2019-08-23 NOTE — ANESTHESIA POSTPROCEDURE EVALUATION
Patient: Debbi Grossman    * No procedures listed *    Diagnosis:* No pre-op diagnosis entered *  Diagnosis Additional Information: No value filed.    Anesthesia Type:  No value filed.    Note:  Anesthesia Post Evaluation    Patient location during evaluation: Bedside  Patient participation: Able to fully participate in evaluation  Level of consciousness: awake and alert  Pain management: adequate  Airway patency: patent  Cardiovascular status: acceptable and hemodynamically stable  Respiratory status: acceptable and unassisted  Hydration status: acceptable  PONV: none     Anesthetic complications: None    Comments: To C Section for failure to progress  Cardiology following for new onset A fib with RVR, echo normal, started on Diltiazem gtt for rate control, spontaneously converted to NSR, diltiazem will be stopped per Cardiology.  Will use Epidural catheter        Last vitals:  Vitals:    08/23/19 1245 08/23/19 1300 08/23/19 1330   BP: 105/69 107/69    Resp:   16   Temp:   36.7  C (98  F)   SpO2: 97% 98%          Electronically Signed By: Awais Marti MD  August 23, 2019  5:22 PM

## 2019-08-23 NOTE — PROGRESS NOTES
OB Progress Note    Came to evaluate progress with pushing.  Started pushing at 1545 at c/c/+1 station.  Minimal descent since that time.  Suspicion by RN for OP position.  Debbi with excellent effort and pushes but minimal descent.  Left OP position confirmed.  Fetal heart tracing now with repetitive late decelerations with each push.  Baseline 150s with moderate variability between pushes but decels to 100s-110s with pushes and slow return to baseline.  Given our remoteness from delivery and repetitive decels, I have recommended primary  section for failure to descend and fetal intolerance of labor.  Pitocin discontinued and appropriate teams notified.  Consent signed.  Discussed procedure in detail including risks of bleeding, infection, damage to surrounding organs, postoperative complications such as DVT/blood clot, infection, etc.    Cardiology present following our decision to proceed with  section.  With conversion out of atrial fibrillation, has recommended discontinuation of diltiazem gtt and only continuous telemetry.  If remains in normal sinus rhythm, will not need postpartum rate/rhythm control or anticoagulation.  Will still plan on recovery in [facility].    Natalee Farris MD

## 2019-08-23 NOTE — CONSULTS
[facility]    Cardiac Electrophysiology Consultation     Date of Admission:  8/23/2019  Date of Consult (When I saw the patient): 08/23/19    Assessment & Plan   Debbi Grossman is a 33 year old female who was admitted on 8/23/2019. I was asked to see the patient for new onset of AF. First pregnancy came in at 230 this am in active labor and noted to be in AF at a rate of 124 bpm. No palpitations just pain from labor. dilt gtt started. Current hr 110-120's with sbp 110's. Last ob visit this past Tuesday and was told everything is going well. BP recorded as 114/70 but I did not see HR. First pregnancy.    Asymptomatic AF with RVR of unknown duration. Normal cardiac structure and function. AF has been noted in pregnancy with unknown mechanism but suspect related to changes in autonomic tone and hormones, and addition to increase pre-load with expected atrial stretching.     Agree with dilt gtt for rate control and expect to have normal vaginal delivery. If unstable proceed with cardioversion. Post delivery, as long as hemodynamically stable and without sxs rate control (either Toprol or Dilt minimal risk for lactation) along with warfarin. Plan for cardioversion in 3-4 weeks provided weekly therapeutic INRs and would need to be on warfarin for another 4 weeks post. A long term monitor such as Cinemuro or personal Smart Watch or hr monitor can be helpful to look for subclinical AF recurrence.     Cole De Jesus    Code Status    No Order    Primary Care Physician   Marixa oRche    History is obtained from the patient    Past Medical History   I have reviewed this patient's medical history and updated it with pertinent information if needed.   Past Medical History:   Diagnosis Date     CP (cerebral palsy) (H)      Seasonal allergies        Past Surgical History   I have reviewed this patient's surgical history and updated it with pertinent information if needed.  Past Surgical History:   Procedure  Laterality Date     HC REMOVE TONSILS/ADENOIDS,<13 Y/O       HC TOOTH EXTRACTION W/FORCEP      [place] Teeth       Prior to Admission Medications   Prior to Admission Medications   Prescriptions Last Dose Informant Patient Reported? Taking?   Docosahexaenoic Acid (DHA PO) 8/22/2019 at Unknown time  Yes Yes   Prenatal Multivit-Min-Fe-FA (PRENATAL VITAMINS PO) 8/22/2019 at Unknown time  Yes Yes   Sig: Take 1 tablet by mouth   adapalene (DIFFERIN) 0.1 % gel 8/22/2019 at Unknown time  No Yes   Sig: Apply  topically At Bedtime.Need annual office visit   benzoyl peroxide-erythromycin (BENZAMYCIN) topical gel 8/22/2019 at Unknown time  No Yes   Sig: Apply twice daily   mometasone (NASONEX) 50 MCG/ACT spray 8/22/2019 at Unknown time  No Yes   Sig: Spray 2 sprays into both nostrils daily      Facility-Administered Medications: None     Allergies   Allergies   Allergen Reactions     Seasonal Allergies        Social History   I have reviewed this patient's social history and updated it with pertinent information if needed. Debbi Grossmna  reports that she has never smoked. She has never used smokeless tobacco. She reports that she does not drink alcohol or use drugs.    Family History   I have reviewed this patient's family history and updated it with pertinent information if needed.   Family History   Problem Relation Age of Onset     Hypertension Father      Diabetes Father      Psychotic Disorder Maternal Grandmother      Prostate Cancer Maternal Grandfather      Cancer - colorectal Maternal Grandfather      Diabetes Maternal Grandfather      Depression Brother         bipolar depression     Neurologic Disorder Brother         epliepsy     Neurologic Disorder Sister        Review of Systems   Comprehensive review of systems was performed with pertinent positives and negatives listed in assessment and plan section.    Physical Exam   Temp: 97.6  F (36.4  C) Temp src: Oral BP: 101/57   Heart Rate: 128 Resp: 16 SpO2: 98 % O2  Device: None (Room air)    Vital Signs with Ranges  Temp:  [97.5  F (36.4  C)-97.6  F (36.4  C)] 97.6  F (36.4  C)  Heart Rate:  [] 128  Resp:  [16] 16  BP: ()/(42-82) 101/57  SpO2:  [94 %-100 %] 98 %  160 lbs 0 oz    Constitutional: alert  Eyes: Lids and lashes normal, pupils equal, round and, extra ocular muscles intact, sclera clear, conjunctiva normal  ENT: Normocephalic, without obvious abnormality, atraumatic, sinuses nontender on palpation, external ears without lesions, oral pharynx with moist mucous membranes, tonsils without erythema or exudates, gums normal and good dentition.  Hematologic / Lymphatic: no cervical lymphadenopathy  Respiratory: No increased work of breathing, good air exchange, clear to auscultation bilaterally, no crackles or wheezing  Cardiovascular: irregularly irregular rhythm  GI: No scars, pregnant  Skin: no bruising or bleeding  Musculoskeletal: There is no redness, warmth, or swelling of the joints.  Full range of motion noted.  Neurologic: Awake, alert,   Neuropsychiatric: General: normal, calm and normal eye contact    Data   I personally reviewed all recent ECGs and images.  Results for orders placed or performed during the hospital encounter of 19 (from the past 24 hour(s))   Hospitalist IP Consult: Patient to be seen: STAT - within 1 hour; Call back #: [phone]; arrhythmia, symptomatic; Consultant may enter orders: Yes; Requesting provider? Attending physician    Nelson Russ MD     2019  5:11 AM  [facility]  Consult Note - Hospitalist Service     Date of Admission:  2019  Consult Requested by: Dr. Luna   Reason for Consult: newly diagnosed atrial fibrillation     Assessment & Plan   Debbi Grossman is a 33 year old female admitted on 2019. She   has no significant past medical history and  40 weeks   pregnant who presented to labor and delivery with increased   lethargy, confusion, active labor, and  atrial fibrillation with   rapid ventricular response.     #. Atrial fibrillation with rapid ventricular response during   pregnancy: patient with borderline hypotension with 12 lead EKG   demonstrating atrial fibrillation with rapid ventricular   response. It's not clear for how long the patient has been in   atrial fibrillation; therefore, will opt more for rate control   over rhythm control. It's not clear if patient (or baby) has   underlying structural heart disease or if atrial fibrillation   being solely driven due to altered hemodynamics due to pregnancy.   Will also look for underlying electrolyte derangements, anemia,   and/or thyroid disease. Case discussed with on call cardiologist.     -diltiazem drip starting at 5 mg/hour, 10 mg diltiazem bolus   -1L lactated ringers bolus   -telemetry   -TTE ordered for tomorrow am   -defer anticoagulation given that patient actively in labor and   wanting to mitigate bleeding  -cardiology consult    -STAT BMP, CBC, and TSH   -would consider  echo - information passed on to OB/GYN to   d/w MFM (looking for structural heart disease in infant)     #. Acute encephalopathy: appears to be mild, as the patient able   to converse and follow commands. Suspect due to hypoperfusion;   however, perhaps due to early sepsis.   -metabolic labs as mentioned above  -UA ordered  -will continue monitoring mentation as we treat the patient's   heart rate    #. Active labor: to be managed by OB/GYN. Case already discussed   with Dr. Luna.       The patient's care was discussed with the Patient. Hospitalist   medicine will continue to follow the patient.     Nelson Wolf MD  [facility]    __________________________________________________________________  ____    Chief Complaint   Atrial fibrillation     History is obtained from the patient, patient's nurse, and   patient's significant other     History of Present Illness   Debbi Grossman is a 33 year old  "female who presented to [facility]   Labor and deliver after she started having contractions earlier   in the evening. Furthermore, the patient reported feeling more   \"woozy\" over the course of the evening. She denied chest pain or   shortness of breath. Per the patient's , she had also been   slower to answer questions on their way to L&D. When the patient   arrived to the floor, she was found to be in atrial fibrillation   with rates in the 140s. No nausea, fevers, or chills. Leading up   to the evening and early morning, the patient reported feeling   pretty well. The patient is  and ~40 weeks pregnant.    Review of Systems   The 10 point Review of Systems is negative other than noted in   the HPI or here.     Past Medical History    I have reviewed this patient's medical history and updated it   with pertinent information if needed.   Past Medical History:   Diagnosis Date     CP (cerebral palsy) (H)      Seasonal allergies        Past Surgical History   I have reviewed this patient's surgical history and updated it   with pertinent information if needed.  Past Surgical History:   Procedure Laterality Date     HC REMOVE TONSILS/ADENOIDS,<11 Y/O       HC TOOTH EXTRACTION W/FORCEP      [place] Teeth       Social History   I have reviewed this patient's social history and updated it with   pertinent information if needed.  Social History     Tobacco Use     Smoking status: Never Smoker     Smokeless tobacco: Never Used   Substance Use Topics     Alcohol use: No     Frequency: Never     Comment: Less than once a month     Drug use: No       Family History   I have reviewed this patient's family history and updated it with   pertinent information if needed.   Family History   Problem Relation Age of Onset     Hypertension Father      Diabetes Father      Psychotic Disorder Maternal Grandmother      Prostate Cancer Maternal Grandfather      Cancer - colorectal Maternal Grandfather      Diabetes Maternal " Grandfather      Depression Brother         bipolar depression     Neurologic Disorder Brother         epliepsy     Neurologic Disorder Sister      Medications    Medications Prior to Admission   Medication Sig Dispense Refill Last Dose     adapalene (DIFFERIN) 0.1 % gel Apply  topically At Bedtime.Need   annual office visit 45 g 3 8/22/2019 at Unknown time     benzoyl peroxide-erythromycin (BENZAMYCIN) topical gel Apply   twice daily 45 g 3 8/22/2019 at Unknown time     Docosahexaenoic Acid (DHA PO)    8/22/2019 at Unknown time     mometasone (NASONEX) 50 MCG/ACT spray Spray 2 sprays into both   nostrils daily 3 Box 3 8/22/2019 at Unknown time     Prenatal Multivit-Min-Fe-FA (PRENATAL VITAMINS PO) Take 1   tablet by mouth   8/22/2019 at Unknown time       Allergies   Allergies   Allergen Reactions     Seasonal Allergies        Physical Exam   Vital Signs: Temp: 97.5  F (36.4  C) Temp src: Temporal BP:   109/77   Heart Rate: 151   SpO2: 94 % O2 Device: None (Room air)      Weight: 160 lbs 0 oz    Gen: NAD, tired appearing    HEENT: MMM, EOMI   Neck: supple, no posterior/anterior cervical LAD  Lungs: CTAB, no W/R/R, no accessory muscle use   CV: irregularly irregular rhythm w rate in the 140s, no M/G/R, no   JVD   Abd: gravid abdomen  Ext: no clubbing/cyanosis/edema  MSK: no joint effusions or tenderness  Skin: no obvious bruising or open wounds   Neuro: AOx3, cranial nerves II-XII in tact, slow to answer   questions but following commands       Data   I personally reviewed the EKG tracing showing atrial   fibrillation.    ROUTINE LABS (Last four results)  CMP  Recent Labs   Lab 08/23/19  0352      POTASSIUM 3.3*   CHLORIDE 108   CO2 20   ANIONGAP 11   *   BUN 9   CR 0.54   GFRESTIMATED >90   GFRESTBLACK >90   MITZI 8.5   MAG 1.6   PHOS 2.8   PROTTOTAL 6.2*   ALBUMIN 2.8*   BILITOTAL 0.4   ALKPHOS 134   AST 19   ALT 18     CBC  Recent Labs   Lab 08/23/19  0352   WBC 13.8*   RBC 4.33   HGB 14.2   HCT 39.4    MCV 91   MCH 32.8   MCHC 36.0   RDW 12.9        INRNo lab results found in last 7 days.  Arterial Blood GasNo lab results found in last 7 days.   CBC with platelets   Result Value Ref Range    WBC 13.8 (H) 4.0 - 11.0 10e9/L    RBC Count 4.33 3.8 - 5.2 10e12/L    Hemoglobin 14.2 11.7 - 15.7 g/dL    Hematocrit 39.4 35.0 - 47.0 %    MCV 91 78 - 100 fl    MCH 32.8 26.5 - 33.0 pg    MCHC 36.0 31.5 - 36.5 g/dL    RDW 12.9 10.0 - 15.0 %    Platelet Count 223 150 - 450 10e9/L   Comprehensive metabolic panel   Result Value Ref Range    Sodium 139 133 - 144 mmol/L    Potassium 3.3 (L) 3.4 - 5.3 mmol/L    Chloride 108 94 - 109 mmol/L    Carbon Dioxide 20 20 - 32 mmol/L    Anion Gap 11 3 - 14 mmol/L    Glucose 112 (H) 70 - 99 mg/dL    Urea Nitrogen 9 7 - 30 mg/dL    Creatinine 0.54 0.52 - 1.04 mg/dL    GFR Estimate >90 >60 mL/min/[1.73_m2]    GFR Estimate If Black >90 >60 mL/min/[1.73_m2]    Calcium 8.5 8.5 - 10.1 mg/dL    Bilirubin Total 0.4 0.2 - 1.3 mg/dL    Albumin 2.8 (L) 3.4 - 5.0 g/dL    Protein Total 6.2 (L) 6.8 - 8.8 g/dL    Alkaline Phosphatase 134 40 - 150 U/L    ALT 18 0 - 50 U/L    AST 19 0 - 45 U/L   ABO/Rh type and screen   Result Value Ref Range    ABO A     RH(D) Pos     Antibody Screen Neg     Test Valid Only At [facility]        Specimen Expires 08/26/2019    Magnesium   Result Value Ref Range    Magnesium 1.6 1.6 - 2.3 mg/dL   Phosphorus   Result Value Ref Range    Phosphorus 2.8 2.5 - 4.5 mg/dL   TSH with free T4 reflex   Result Value Ref Range    TSH 4.52 (H) 0.40 - 4.00 mU/L   T4 free   Result Value Ref Range    T4 Free 1.12 0.76 - 1.46 ng/dL   EKG 12-lead, tracing only   Result Value Ref Range    Interpretation ECG Click View Image link to view waveform and result    UA with Microscopic reflex to Culture   Result Value Ref Range    Color Urine Yellow     Appearance Urine Clear     Glucose Urine Negative NEG^Negative mg/dL    Bilirubin Urine Negative NEG^Negative    Ketones Urine  >150 (A) NEG^Negative mg/dL    Specific Gravity Urine 1.023 1.003 - 1.035    Blood Urine Negative NEG^Negative    pH Urine 6.5 5.0 - 7.0 pH    Protein Albumin Urine 30 (A) NEG^Negative mg/dL    Urobilinogen mg/dL Normal 0.0 - 2.0 mg/dL    Nitrite Urine Negative NEG^Negative    Leukocyte Esterase Urine Negative NEG^Negative    Source Catheterized Urine     WBC Urine 0 0 - 5 /HPF    RBC Urine <1 0 - 2 /HPF    Squamous Epithelial /HPF Urine <1 0 - 1 /HPF    Mucous Urine Present (A) NEG^Negative /LPF   Echocardiogram Complete    Narrative    [id number]  [id number]  [id number]  [id number]^REJI^HOLLEY^[employer] [facility]  Echocardiography Laboratory  [facility] [place] [address]        Name: DIMPLE COCHRAN  MRN: [id number]  : 1985  Study Date: 2019 08:47 AM  Age: 33 yrs  Gender: Female  Patient Location: [facility]  Reason For Study: Afib  History: PREGNANT  Ordering Physician: HOLLEY MENDOZA  Referring Physician: FRANC MACIAS  Performed By: Roshan Daniel RDCS     BSA: 1.9 m2  Height: 70 in  Weight: 160 lb  HR: 128  BP: 112/75 mmHg  _____________________________________________________________________________  __        Procedure  Complete Portable Echo Adult.  _____________________________________________________________________________  __        Interpretation Summary     Tachycardia noted to 120 - 155 bpm.  Left ventricular systolic function is normal. The visual ejection fraction is  estimated at 55-60%. Diastolic Doppler findings (E/E' ratio and/or other  parameters) suggest left ventricular filling pressures are normal.  The right atrium is mildly dilated. Right ventricular systolic pressure could  not be approximated due to inadequate tricuspid regurgitation.  There is no comparison study available.  _____________________________________________________________________________  __        Left Ventricle  The left ventricle is normal in size. There is normal left  ventricular wall  thickness. Left ventricular systolic function is normal. The visual ejection  fraction is estimated at 55-60%. Diastolic Doppler findings (E/E' ratio and/or  other parameters) suggest left ventricular filling pressures are normal. No  regional wall motion abnormalities noted.     Right Ventricle  The right ventricle is normal size. The right ventricular systolic function is  normal. The right ventricle is not well visualized.     Atria  Normal left atrial size. The right atrium is mildly dilated. There is no  atrial shunt seen.     Mitral Valve  The mitral valve is not well visualized. There is no mitral regurgitation  noted.        Tricuspid Valve  The tricuspid valve is not well visualized. There is trace tricuspid  regurgitation. Right ventricular systolic pressure could not be approximated  due to inadequate tricuspid regurgitation. IVC diameter <2.1 cm collapsing  >50% with sniff suggests a normal RA pressure of 3 mmHg.     Aortic Valve  The aortic valve is not well visualized. No aortic regurgitation is present.  No hemodynamically significant valvular aortic stenosis.     Pulmonic Valve  There is no pulmonic valvular stenosis.     Vessels  Normal size aorta.     Pericardium  The pericardium appears normal.        Rhythm  Tachycardia to 120 - 155 bpm.  _____________________________________________________________________________  __  MMode/2D Measurements & Calculations  IVSd: 0.96 cm     LVIDd: 4.3 cm  LVIDs: 3.0 cm  LVPWd: 1.2 cm  FS: 30.8 %  LV mass(C)d: 158.0 grams  LV mass(C)dI: 83.2 grams/m2  Ao root diam: 3.2 cm  LA dimension: 3.9 cm  asc Aorta Diam: 3.3 cm  LA/Ao: 1.2  LA Volume (BP): 58.0 ml  LA Volume Index (BP): 30.5 ml/m2  RWT: 0.54           Doppler Measurements & Calculations  MV E max kandace: 71.6 cm/sec  MV dec time: 0.26 sec  PA acc time: 0.11 sec  E/E' av.6  Lateral E/e': 4.8  Medial E/e': 6.5            _____________________________________________________________________________  __           Report approved by: Katherine Morales 08/23/2019 09:55 AM

## 2019-08-23 NOTE — PLAN OF CARE
Assumed cares on Debbi this morning. Pt comfortable with epidural. Denies any symptom of atrial fibrillation. ROSE MARY Rincon from heart center at bedside. Dr Farris was in to assess pt.  SVE 8/90/0to -1. FHTs category 2.

## 2019-08-23 NOTE — PROVIDER NOTIFICATION
Brief update:    Called for STAT consult for new A-fib, symptomatic with concern for mental status changes.   Patient 40 weeks pregnant in early labor.    Discussed with Dr Luna, who is en route to assess patient. Some concern for embolic event given new afib in late pregnancy.    On my arrival, patient is being evaluated by Dr Banegas as an RRT was called (appropriately).    Discussed with Dr Banegas; Initial consult assessment by house team, hospitalist service will continue to follow (house team is now part of hospitalist service).    Chad Liriano MD

## 2019-08-23 NOTE — H&P
OB Admission Note:    HPI: 34yo G1 @ 40wk presenting to MAC with contractions and r/o labor. Upon presentation SVE 2 and changed to 3+. During assessment in MAC, maternal pulse noted to be irregular and variable. Pt started to report feeling dizzy,  notes pt is not acting as herself, disoriented to date (gave her birth date). Rapid response called.    Hospitalist consulted.  Upon my arrival pt oriented and appears fatigued but answering questions appropriately. States contractions started around 8:30pm last night, became nauseated but no vomiting.  states she was acting appropriately, seemed fatigued when arrived at hospital to walk in to MAC.   No prior hx heart disease, metabolic abnormality. Feeling painful contractions, breathing through them.     Pregnancy Problem List:  Uncomplicated.  Maternal cerebral palsy w/o affects  GBS neg    Past Medical History:   Diagnosis Date     CP (cerebral palsy) (H)      Seasonal allergies      Past Surgical History:   Procedure Laterality Date     HC REMOVE TONSILS/ADENOIDS,<13 Y/O       HC TOOTH EXTRACTION W/FORCEP      [place] Teeth     Social History     Socioeconomic History     Marital status:      Spouse name: Not on file     Number of children: 0     Years of education: Not on file     Highest education level: Not on file   Occupational History     Employer: [employer]   Social Needs     Financial resource strain: Not on file     Food insecurity:     Worry: Not on file     Inability: Not on file     Transportation needs:     Medical: Not on file     Non-medical: Not on file   Tobacco Use     Smoking status: Never Smoker     Smokeless tobacco: Never Used   Substance and Sexual Activity     Alcohol use: No     Frequency: Never     Comment: Less than once a month     Drug use: No     Sexual activity: Yes     Partners: Male     Birth control/protection: None   Lifestyle     Physical activity:     Days per week: Not on file     Minutes per session: Not on file  "    Stress: Not on file   Relationships     Social connections:     Talks on phone: Not on file     Gets together: Not on file     Attends Worship service: Not on file     Active member of club or organization: Not on file     Attends meetings of clubs or organizations: Not on file     Relationship status: Not on file     Intimate partner violence:     Fear of current or ex partner: Not on file     Emotionally abused: Not on file     Physically abused: Not on file     Forced sexual activity: Not on file   Other Topics Concern     Parent/sibling w/ CABG, MI or angioplasty before 65F 55M? Not Asked   Social History Narrative     Not on file        Allergies   Allergen Reactions     Seasonal Allergies        O: Vital signs:  Temp: 97.5  F (36.4  C) Temp src: Temporal BP: 113/72   Heart Rate: 125   SpO2: 94 % O2 Device: None (Room air)   Height: 176.5 cm (5' 9.5\") Weight: 72.6 kg (160 lb)  Estimated body mass index is 23.29 kg/m  as calculated from the following:    Height as of this encounter: 1.765 m (5' 9.5\").    Weight as of this encounter: 72.6 kg (160 lb).      Vitals:    08/23/19 0451 08/23/19 0456 08/23/19 0517 08/23/19 0520   BP: 106/69 113/70 111/73 113/72   Temp:       TempSrc:       SpO2:       Weight:       Height:         Gen: AOx3, NAD. Breathing through contractions  Chest: nonlabored breathing  Abd: gravid, nontender, nondistended  SVE: 5cm  Ext: no edema, no calf ttp     cat 1  Ctx: q 3min    A/P: 34yo G1 @ 40wk presenting in AL found to have AFIB.   -Rapid response called and Dr. Wolf and team responded. Consulted hospitalist, discussed with Dr. Liriano. Dr. Wolf taking the consult and assisting with care. He has consulted Cardiology as well. Appreciate immediate response and excellent assistance.  -On tele, Diltiazem drip started. Echo cardiogram planned. See Dr Wolf note for further plans.   -Anesthesia consulted. Epidural planned as pt desires this once rate controlled. "   -Hypokalemia, replacement protocol ordered. Additional labs pending  -[facility], Dr. Olivarez, consulted. Additional labs ordered. No contraindications at this time for proceeding with .   -Reassuring fetal status. Will discuss with Peds after delivery, perhaps infant will need cardiac imaging.    Primary OB: Brenton Drew Masters, DO  2019

## 2019-08-23 NOTE — PROGRESS NOTES
Discussed case with Dr. Farris, with Dr. Cole Villareal and with the patient and family.  The patient has spontaneously gone back into sinus rhythm.  She is now going for .  We will stop the intravenous diltiazem as she is now back in sinus rhythm and diltiazem does not prevent to the rhythm from going back into atrial fibrillation.  She does not need to be on any anticoagulation or aspirin and aspirin is secreted into the breast milk which is undesirable.  As Dr Villareal has recommended, the patient on discharge should have a ZIO Patch for 2 weeks to see if she is having asymptomatic episodes of atrial fibrillation.  [place] [facility] [place] after her .

## 2019-08-23 NOTE — CONSULTS
Consult Date:  08/23/2019      CARDIOLOGY CONSULTATION      REFERRING PHYSICIAN:  Hospitalist Service, Dr. Wolf.      INDICATION FOR CARDIAC CONSULTATION:  Atrial fibrillation with rapid ventricular response.      It is my pleasure to see your patient in consultation.  She is a 33-year-old female patient who has gone into labor and was admitted earlier this morning.  It was noted that she was having a lot of back discomfort and a lot of contractions.  She describes has been feeling more woozy.  She does tend to run a lower blood pressure.  She did not notice any palpitations, chest discomfort or shortness of breath.  I do note from the hospitalist note that her  has noticed that she was slow to answer questions on the way to Labor and Delivery than normal.  When she arrived on the floor  she was found to be in atrial fibrillation with rapid ventricular response.  The 12-lead electrocardiogram which was performed at 3:54 this morning, showed a ventricular rate of 124 beats per minute, T-wave inversion in lead III, but otherwise no ischemic changes.  At present, she is hemodynamically stable with a blood pressure in the 112/75 laura.  Her heart rates, at least when I have been in with her, have varied between 120 and 130.  She is now on diltiazem and she is on 10 mg per hour.  The dose has been titrated up.  She does have a cardiac nurse with her in her room.  At present, she feels well.  She has had an epidural placed.  She does not feel short of breath.  She has not noticed any ankle edema.  Interestingly, this patient did run a half-marathon at 7 months of pregnancy.  She was last seen at OB/GYN on Tuesday and her heart rates at that stage were normal, so it does appear that she has gone into this since Tuesday.  As I mentioned above, she has not noticed rapid heartbeat.  She has not noticed palpitations.  With respect to causes for this, the only obvious abnormality so far as a low potassium of 3.3, which  is now being replaced.  Her TSH was just borderline at 4.52 with upper limits of normal being 4.00.  Her magnesium was borderline at 1.6.  She has not had her echocardiogram performed yet, but as I was leaving the room, the echo tech it was arriving in to perform the stat echocardiogram.      PAST MEDICAL HISTORY:  History of cerebral palsy and seasonal allergies.      FAMILY HISTORY:  There is no family history of atrial fibrillation.      SOCIAL HISTORY:  She is .      MEDICATIONS:  Diltiazem 10 mg intravenous bolus and she is on continuous IV diltiazem, she is on 10 mg per hour at present and being titrated up.      ALLERGIES:  She has seasonal allergies.      REVIEW OF SYSTEMS:   CONSTITUTIONAL:  Tiredness.   EYES:  Negative.   ENT:  Negative.   CARDIOVASCULAR:  As above.   RESPIRATORY:  Nil.   GASTROINTESTINAL:  Nil.   GENITOURINARY:  As above.  She is actively in labor.   NEUROLOGICAL:  Nil.   PSYCHIATRIC:  Nil.   ENDOCRINE:  Nil.   HEMATOLYMPHATIC:  Nil.   ALLERGY AND IMMUNOLOGY:  As above.      PHYSICAL EXAMINATION:   GENERAL:  She is a pleasant patient.  She is in no apparent distress.  She is fully oriented to time, place and person.   VITAL SIGNS:  Her blood pressure is 112/75, her pulse is 130 beats per minute, but is varying between 110 and 130.  O2 sats are 96%, temperature is 97.5.   HEENT AND NECK:  She has normal facial symmetry.  Her pupils are equal.  She has good dentition.  I cannot assess her jugulars because she is lying flat in turn on her side because she has an epidural in situ.  Her carotids are normal with no bruits.  Trachea is not deviated.   HEART:  [place] beat is impalpable.  Heart sound 1 is variable, heart sound 2 normal.  No murmurs.   CHEST:  Clear to percussion and auscultation with no added sounds.   ABDOMEN:  Reveals a gravid uterus.  No obvious tenderness.  She has an epidural catheter in her back.   NEUROLOGIC:  She moves all 4 limbs appropriately. She has no peripheral  edema.  She has normal distal pulses.   NEUROLOGIC:  As I mentioned above, she is fully oriented to time, place and person with a normal affect.   SKIN:  No skin lesions noted.      LABORATORY INVESTIGATIONS:  Sodium 139, potassium 3.3, BUN 9, creatinine 0.54, GFR greater than 90.  Magnesium 1.6, albumin low at 2.8, total protein low at 6.2, T4 normal at 1.12.  TSH slightly abnormal at 4.52, white cell count is raised at 13.8, glucose is 112, hemoglobin 14.2.      IMPRESSION:  Unusual case.  Newly diagnosed atrial fibrillation in an otherwise very healthy 33-year-old female patient who is actively in labor and has an epidural in situ.  The patient is hemodynamically stable.  Her blood pressure is somewhat on the lower side, but tends to run on the low side anyway.  The low potassium and borderline magnesium may be playing a role. We certainly need to rule out structural heart disease such as atrial septal defect or cardiomyopathy.      PLAN:  First, I do agree with the intravenous diltiazem.  It appears that the Hospitalist has okayed this with the obstetrician.  We will obtain a stat echocardiogram.  We will also obtain the opinion of the electrophysiologist.  After she delivers will be how to get her back into normal rhythm, mainly with respect to anticoagulation.  We will certainly need guidance of the obstetrician when anticoagulation can be used postpartum.  Hopefully, when she delivers and the adrenergic state has diminished, her heart rates will come down, and we can switch her to oral medications.  Beta blockers tend to be better drugs orally in the long-term in controlling heart rate, but we will also get the opinion from the electrophysiologists and from the obstetricians about the safety of these drugs, especially if the patient wishes to breastfeed.      Many thanks for allowing me to be involved in the care of this somewhat complicated patient.         HOLLEY OATES MD, FACC             D:  2019   T: 2019   MT: DEONTE      Name:     DIMPLE COCHRAN   MRN:      [id number]        Account:       [id number]   :      1985           Consult Date:  2019      Document: [id number]

## 2019-08-23 NOTE — PROGRESS NOTES
Discussed case with Dr. Natalee Farris OB/GYN.  The patient can have Lovenox 12 to 24 hours after spontaneous vagina delivery or after 24 hours if a  section is performed.  The patient can be started on warfarin and bridged with the Lovenox.  I specifically asked Dr. Farris whether Lovenox can be used in a patient with an epidural and she felt that if the epidural was atraumatic that the Lovenox could be started 12 to 24 hours after a spontaneous vagina delivery.  Hopefully we can switch over from intravenous diltiazem to oral diltiazem or even better an oral beta-blocker postpartum.  Beta-blockers tend to have a better rate control and calcium channel blockers but we would need to be okay from OB/GYN/pediatrics if the patient is breast-feeding.  Warfarin is not excreted in the breast milk and should be safe.  After 3 weeks of therapeutic warfarin electrical cardioversion could be performed to get the patient back into sinus rhythm.  Patient would have to remain on warfarin for 4 weeks after electrical cardioversion.  After 4 weeks the patient could be on a baby aspirin for stroke prophylaxis given that her CHADS VASDC score is 0.

## 2019-08-23 NOTE — PROGRESS NOTES
AF converted spontaneously. Since CHADSVASC is very low risk of bleeding is higher with anticoagulation than the risk of CVA without it. No need for ccb now as it is unclear whether it would prevent AF recurrence. Recommending 2 weeks zio outpt. ecg now for formal documentation of sinus.

## 2019-08-23 NOTE — PLAN OF CARE
Data: Patient presented to [facility] at 0232.   Reason for maternal/fetal assessment per patient is Contractions  Patient is a . Prenatal record reviewed.   Gestational Age 40w0d. VSS. Fetal movement present. Patient denies cramping, backache, vaginal discharge, pelvic pressure, UTI symptoms, GI problems, bloody show, vaginal bleeding, edema, headache, visual disturbances, epigastric or URQ pain, abdominal pain, rupture of membranes. Pt denies SOB, slightly faint since presented to hospital.  Pt also states has decreased fluid intake this evening.  Pt states noted contractions earlier this evening around 8pm.  Contractions have continued to become closer in frequency and intensity as the night has progressed.   Support persons Braeden present.  Action: Verbal consent for EFM. Triage assessment completed. EFM applied for fetal wellbeing during contractions. Uterine assessment soft and non tender to touch.   Pulse ox applied and tracer turned on after noting maternal heart rate in the 130's.  Maternal HR fluctuation from .    Response: Dr. Luna paged.

## 2019-08-23 NOTE — TELEPHONE ENCOUNTER
"Spouse calling, contractions 6 minutes apart for the last hour, no bleeding slight nausea. Spouse will continue to monitor advised once 5 minutes apart or less for 2 hrs per guidelines or if water breaks. Verbalizes understanding.  Pita Gleason RN  [facility]      Additional Information    [1] Having contractions or other symptoms of labor (such as vaginal pressure) AND [2] >= 37 weeks pregnant (i.e., term pregnancy)    Negative: Passed out (i.e., lost consciousness, collapsed and was not responding)    Negative: Shock suspected (e.g., cold/pale/clammy skin, too weak to stand, low BP, rapid pulse)    Negative: Difficult to awaken or acting confused (e.g., disoriented, slurred speech)    Negative: [1] SEVERE abdominal pain (e.g., excruciating) AND [2] constant AND [3] present > 1 hour    Negative: Severe bleeding (e.g., continuous red blood from vagina, or large blood clots)    Negative: Umbilical cord hanging out of the vagina (shiny, white, curled appearance, \"like telephone cord\")    Negative: Uncontrollable urge to push (i.e., feels like baby is coming out now)    Negative: Can see baby    Negative: Sounds like a life-threatening emergency to the triager    Negative: [1] First baby (primipara) AND [2] contractions < 6 minutes apart  AND [3] present 2 hours    Negative: [1] History of prior delivery (multipara) AND [2] contractions < 10 minutes apart AND [3] present 1 hour    Negative: [1] History of rapid prior delivery AND [2] contractions < 10 minutes apart    Negative: [1] Leakage of fluid from vagina AND [2] green or brown in color    Negative: [1] Leakage of fluid from vagina AND [2] leakage started > 4 hours ago    Negative: Vaginal bleeding or spotting    Negative: Baby moving less today (e.g., kick count < 5 in 1 hour or < 10 in 2 hours)    Negative: Severe headache or headache that won't go away    Negative: New blurred vision or vision changes    [1] First baby (primipara) AND [2] " contractions > 5 minutes apart, or for < 2 hours    Negative: MODERATE-SEVERE abdominal pain    Negative: Fever > 100.4 F (38.0 C)    Negative: [1] Leakage of fluid from vagina AND [2] leakage started < 4 hours ago    Negative: Patient sounds very sick or weak to the triager    Protocols used: PREGNANCY - ABDOMINAL PAIN GREATER THAN 20 WEEKS EGA-A-AH, PREGNANCY - LABOR-A-AH

## 2019-08-23 NOTE — PROGRESS NOTES
OB Progress Note    S: Doing well.  Comfortable with epidural.  Denies chest pain or shortness of breath.  Has some movement of her legs but no contraction pain    O: AF, P: 110s during my time in the room, irregular; BPs: /61-70  Gen: alert, pleasant; comfortable, oriented  Abd: gravid, nontender  Ext: no edema  Cervix: 8/90/0-1  [place]: q2.5-4min  FHT: 120s; mod variability, +accels; no decels    A/P: 32yo  admitted at 40+0wks in active labor with atrial fibrillation  -continue expectant management  -continuous fetal monitoring  -appreciate cardiology/hospitalist assistance --currently on diltiazem gtt with goal to keep HR <110 and BPs systolic >90; electrolyte replacement per cardiology  -anticipate vaginal delivery  -will discuss postpartum recovery with cardiology --assume this will be on cardiac unit for telemetry and cardiac medication management    Natalee Farris MD

## 2019-08-23 NOTE — ANESTHESIA PREPROCEDURE EVALUATION
"Anesthesia Pre-Procedure Evaluation    Patient: Debbi Grossman   MRN: [id number] : 1985          Preoperative Diagnosis: * No surgery found *        Past Medical History:   Diagnosis Date     CP (cerebral palsy) (H)      Seasonal allergies      Past Surgical History:   Procedure Laterality Date     HC REMOVE TONSILS/ADENOIDS,<13 Y/O       HC TOOTH EXTRACTION W/FORCEP      [place]       Anesthesia Evaluation     .             ROS/MED HX    ENT/Pulmonary:       Neurologic:       Cardiovascular: Comment: New onset afib RVR         METS/Exercise Tolerance:     Hematologic:         Musculoskeletal:         GI/Hepatic:         Renal/Genitourinary:         Endo:         Psychiatric:         Infectious Disease:         Malignancy:         Other:                       (-) no pre-eclampsia                 Lab Results   Component Value Date    WBC 13.8 (H) 2019    HGB 14.2 2019    HCT 39.4 2019     2019    SED 6 2012     2019    POTASSIUM 3.3 (L) 2019    CHLORIDE 108 2019    CO2 20 2019    BUN 9 2019    CR 0.54 2019     (H) 2019    MITZI 8.5 2019    PHOS 2.8 2019    MAG 1.6 2019    ALBUMIN 2.8 (L) 2019    PROTTOTAL 6.2 (L) 2019    ALT 18 2019    AST 19 2019    ALKPHOS 134 2019    BILITOTAL 0.4 2019    TSH 4.52 (H) 2019    T4 1.12 2019       Preop Vitals  BP Readings from Last 3 Encounters:   19 102/62   19 114/70   19 112/60    Pulse Readings from Last 3 Encounters:   19 68   18 86   17 91      Resp Readings from Last 3 Encounters:   14 14   12 16   12 18    SpO2 Readings from Last 3 Encounters:   19 100%   18 98%   17 97%      Temp Readings from Last 1 Encounters:   19 36.4  C (97.5  F) (Temporal)    Ht Readings from Last 1 Encounters:   19 1.765 m (5' 9.5\")      Wt Readings " "from Last 1 Encounters:   08/23/19 72.6 kg (160 lb)    Estimated body mass index is 23.29 kg/m  as calculated from the following:    Height as of this encounter: 1.765 m (5' 9.5\").    Weight as of this encounter: 72.6 kg (160 lb).       Anesthesia Plan      History & Physical Review      ASA Status:  2 .  OB Epidural Asa: 2       Plan for Epidural          Postoperative Care      Consents  Anesthetic plan, risks, benefits and alternatives discussed with:  Patient and Patient..                 Kong Steward MD  "

## 2019-08-24 ENCOUNTER — APPOINTMENT (OUTPATIENT)
Dept: GENERAL RADIOLOGY | Facility: CLINIC | Age: 34
End: 2019-08-24
Attending: OBSTETRICS & GYNECOLOGY
Payer: COMMERCIAL

## 2019-08-24 PROBLEM — D62 ACUTE BLOOD LOSS ANEMIA: Status: ACTIVE | Noted: 2019-08-24

## 2019-08-24 LAB
ANION GAP SERPL CALCULATED.3IONS-SCNC: 8 MMOL/L (ref 3–14)
APTT PPP: 32 SEC (ref 22–37)
APTT PPP: 34 SEC (ref 22–37)
APTT PPP: NORMAL SEC (ref 22–37)
BASE DEFICIT BLDA-SCNC: 10.2 MMOL/L
BUN SERPL-MCNC: 7 MG/DL (ref 7–30)
CALCIUM SERPL-MCNC: 7.3 MG/DL (ref 8.5–10.1)
CHLORIDE SERPL-SCNC: 116 MMOL/L (ref 94–109)
CO2 BLD-SCNC: 18 MMOL/L (ref 21–28)
CO2 BLD-SCNC: 19 MMOL/L (ref 21–28)
CO2 SERPL-SCNC: 18 MMOL/L (ref 20–32)
CREAT SERPL-MCNC: 0.58 MG/DL (ref 0.52–1.04)
ERYTHROCYTE [DISTWIDTH] IN BLOOD BY AUTOMATED COUNT: 13.5 % (ref 10–15)
ERYTHROCYTE [DISTWIDTH] IN BLOOD BY AUTOMATED COUNT: 13.9 % (ref 10–15)
ERYTHROCYTE [DISTWIDTH] IN BLOOD BY AUTOMATED COUNT: 14.2 % (ref 10–15)
FIBRINOGEN PPP-MCNC: 249 MG/DL (ref 200–420)
FIBRINOGEN PPP-MCNC: 264 MG/DL (ref 200–420)
GFR SERPL CREATININE-BSD FRML MDRD: >90 ML/MIN/{1.73_M2}
GLUCOSE BLDC GLUCOMTR-MCNC: 115 MG/DL (ref 70–99)
GLUCOSE BLDC GLUCOMTR-MCNC: 124 MG/DL (ref 70–99)
GLUCOSE BLDC GLUCOMTR-MCNC: 180 MG/DL (ref 70–99)
GLUCOSE SERPL-MCNC: 195 MG/DL (ref 70–99)
HCO3 BLD-SCNC: 17 MMOL/L (ref 21–28)
HCT VFR BLD AUTO: 27 % (ref 35–47)
HCT VFR BLD AUTO: 28 % (ref 35–47)
HCT VFR BLD AUTO: 33.8 % (ref 35–47)
HCT VFR BLD CALC: 23 %PCV (ref 35–47)
HCT VFR BLD CALC: 28 %PCV (ref 35–47)
HCT VFR BLD CALC: 28 %PCV (ref 35–47)
HGB BLD CALC-MCNC: 7.8 G/DL (ref 11.7–15.7)
HGB BLD CALC-MCNC: 9.5 G/DL (ref 11.7–15.7)
HGB BLD CALC-MCNC: 9.5 G/DL (ref 11.7–15.7)
HGB BLD-MCNC: 11.6 G/DL (ref 11.7–15.7)
HGB BLD-MCNC: 8.9 G/DL (ref 11.7–15.7)
HGB BLD-MCNC: 9.2 G/DL (ref 11.7–15.7)
HGB BLD-MCNC: 9.5 G/DL (ref 11.7–15.7)
HGB BLD-MCNC: 9.6 G/DL (ref 11.7–15.7)
INR PPP: 1.23 (ref 0.86–1.14)
INR PPP: 1.27 (ref 0.86–1.14)
INR PPP: NORMAL (ref 0.86–1.14)
INTERPRETATION ECG - MUSE: NORMAL
LACTATE BLD-SCNC: 4.1 MMOL/L (ref 0.7–2)
LDH SERPL L TO P-CCNC: 177 U/L (ref 81–234)
MCH RBC QN AUTO: 31.9 PG (ref 26.5–33)
MCH RBC QN AUTO: 32 PG (ref 26.5–33)
MCH RBC QN AUTO: 32.2 PG (ref 26.5–33)
MCHC RBC AUTO-ENTMCNC: 34.3 G/DL (ref 31.5–36.5)
MCHC RBC AUTO-ENTMCNC: 34.3 G/DL (ref 31.5–36.5)
MCHC RBC AUTO-ENTMCNC: 35.2 G/DL (ref 31.5–36.5)
MCV RBC AUTO: 91 FL (ref 78–100)
MCV RBC AUTO: 93 FL (ref 78–100)
MCV RBC AUTO: 94 FL (ref 78–100)
OXYHGB MFR BLD: 97 % (ref 92–100)
PCO2 BLD: 39 MM HG (ref 35–45)
PCO2 BLD: 44 MM HG (ref 35–45)
PCO2 BLD: 45 MM HG (ref 35–45)
PH BLD: 7.21 PH (ref 7.35–7.45)
PH BLD: 7.23 PH (ref 7.35–7.45)
PH BLD: 7.24 PH (ref 7.35–7.45)
PLATELET # BLD AUTO: 105 10E9/L (ref 150–450)
PLATELET # BLD AUTO: 110 10E9/L (ref 150–450)
PLATELET # BLD AUTO: 196 10E9/L (ref 150–450)
PO2 BLD: 119 MM HG (ref 80–105)
PO2 BLD: 154 MM HG (ref 80–105)
PO2 BLD: 165 MM HG (ref 80–105)
POTASSIUM BLD-SCNC: 4.7 MMOL/L (ref 3.4–5.3)
POTASSIUM BLD-SCNC: 5.2 MMOL/L (ref 3.4–5.3)
POTASSIUM BLD-SCNC: 5.2 MMOL/L (ref 3.4–5.3)
POTASSIUM SERPL-SCNC: 4.9 MMOL/L (ref 3.4–5.3)
RBC # BLD AUTO: 2.98 10E12/L (ref 3.8–5.2)
RBC # BLD AUTO: 2.98 10E12/L (ref 3.8–5.2)
RBC # BLD AUTO: 3.63 10E12/L (ref 3.8–5.2)
SAO2 % BLDA FROM PO2: 99 % (ref 92–100)
SAO2 % BLDA FROM PO2: 99 % (ref 92–100)
SODIUM BLD-SCNC: 136 MMOL/L (ref 133–144)
SODIUM BLD-SCNC: 137 MMOL/L (ref 133–144)
SODIUM BLD-SCNC: 137 MMOL/L (ref 133–144)
SODIUM SERPL-SCNC: 142 MMOL/L (ref 133–144)
WBC # BLD AUTO: 15 10E9/L (ref 4–11)
WBC # BLD AUTO: 17 10E9/L (ref 4–11)
WBC # BLD AUTO: 22.4 10E9/L (ref 4–11)

## 2019-08-24 PROCEDURE — 85027 COMPLETE CBC AUTOMATED: CPT | Performed by: SURGERY

## 2019-08-24 PROCEDURE — 82803 BLOOD GASES ANY COMBINATION: CPT

## 2019-08-24 PROCEDURE — 83605 ASSAY OF LACTIC ACID: CPT | Performed by: SURGERY

## 2019-08-24 PROCEDURE — 99231 SBSQ HOSP IP/OBS SF/LOW 25: CPT | Performed by: HOSPITALIST

## 2019-08-24 PROCEDURE — 25000132 ZZH RX MED GY IP 250 OP 250 PS 637: Performed by: OBSTETRICS & GYNECOLOGY

## 2019-08-24 PROCEDURE — 25000128 H RX IP 250 OP 636: Performed by: SURGERY

## 2019-08-24 PROCEDURE — 25800030 ZZH RX IP 258 OP 636: Performed by: SURGERY

## 2019-08-24 PROCEDURE — 84132 ASSAY OF SERUM POTASSIUM: CPT

## 2019-08-24 PROCEDURE — 84295 ASSAY OF SERUM SODIUM: CPT

## 2019-08-24 PROCEDURE — 40000277 XR SURGERY CARM FLUORO LESS THAN 5 MIN W STILLS

## 2019-08-24 PROCEDURE — 25000128 H RX IP 250 OP 636: Performed by: HOSPITALIST

## 2019-08-24 PROCEDURE — 36415 COLL VENOUS BLD VENIPUNCTURE: CPT | Performed by: HOSPITALIST

## 2019-08-24 PROCEDURE — 25000125 ZZHC RX 250: Performed by: OBSTETRICS & GYNECOLOGY

## 2019-08-24 PROCEDURE — 80048 BASIC METABOLIC PNL TOTAL CA: CPT | Performed by: SURGERY

## 2019-08-24 PROCEDURE — P9016 RBC LEUKOCYTES REDUCED: HCPCS | Performed by: OBSTETRICS & GYNECOLOGY

## 2019-08-24 PROCEDURE — 00000146 ZZHCL STATISTIC GLUCOSE BY METER IP

## 2019-08-24 PROCEDURE — 25000128 H RX IP 250 OP 636: Performed by: OBSTETRICS & GYNECOLOGY

## 2019-08-24 PROCEDURE — P9059 PLASMA, FRZ BETWEEN 8-24HOUR: HCPCS | Performed by: OBSTETRICS & GYNECOLOGY

## 2019-08-24 PROCEDURE — 85384 FIBRINOGEN ACTIVITY: CPT | Performed by: OBSTETRICS & GYNECOLOGY

## 2019-08-24 PROCEDURE — 25800030 ZZH RX IP 258 OP 636: Performed by: NURSE ANESTHETIST, CERTIFIED REGISTERED

## 2019-08-24 PROCEDURE — 85018 HEMOGLOBIN: CPT | Performed by: HOSPITALIST

## 2019-08-24 PROCEDURE — 83615 LACTATE (LD) (LDH) ENZYME: CPT | Performed by: SURGERY

## 2019-08-24 PROCEDURE — 85730 THROMBOPLASTIN TIME PARTIAL: CPT | Performed by: SURGERY

## 2019-08-24 PROCEDURE — 99232 SBSQ HOSP IP/OBS MODERATE 35: CPT | Mod: 24 | Performed by: INTERNAL MEDICINE

## 2019-08-24 PROCEDURE — 85027 COMPLETE CBC AUTOMATED: CPT | Performed by: OBSTETRICS & GYNECOLOGY

## 2019-08-24 PROCEDURE — 85730 THROMBOPLASTIN TIME PARTIAL: CPT | Performed by: OBSTETRICS & GYNECOLOGY

## 2019-08-24 PROCEDURE — 40000344 ZZHCL STATISTIC THAWING COMPONENT: Performed by: OBSTETRICS & GYNECOLOGY

## 2019-08-24 PROCEDURE — 85610 PROTHROMBIN TIME: CPT | Performed by: SURGERY

## 2019-08-24 PROCEDURE — 85384 FIBRINOGEN ACTIVITY: CPT | Performed by: SURGERY

## 2019-08-24 PROCEDURE — 25000125 ZZHC RX 250: Performed by: NURSE ANESTHETIST, CERTIFIED REGISTERED

## 2019-08-24 PROCEDURE — 85610 PROTHROMBIN TIME: CPT | Performed by: OBSTETRICS & GYNECOLOGY

## 2019-08-24 PROCEDURE — 36415 COLL VENOUS BLD VENIPUNCTURE: CPT | Performed by: OBSTETRICS & GYNECOLOGY

## 2019-08-24 PROCEDURE — 82805 BLOOD GASES W/O2 SATURATION: CPT | Performed by: SURGERY

## 2019-08-24 PROCEDURE — 85014 HEMATOCRIT: CPT

## 2019-08-24 PROCEDURE — 25000128 H RX IP 250 OP 636: Performed by: NURSE ANESTHETIST, CERTIFIED REGISTERED

## 2019-08-24 PROCEDURE — 12000035 ZZH R&B POSTPARTUM

## 2019-08-24 RX ORDER — ONDANSETRON 2 MG/ML
4 INJECTION INTRAMUSCULAR; INTRAVENOUS EVERY 6 HOURS PRN
Status: DISCONTINUED | OUTPATIENT
Start: 2019-08-24 | End: 2019-08-25

## 2019-08-24 RX ORDER — NALOXONE HYDROCHLORIDE 0.4 MG/ML
.1-.4 INJECTION, SOLUTION INTRAMUSCULAR; INTRAVENOUS; SUBCUTANEOUS
Status: DISCONTINUED | OUTPATIENT
Start: 2019-08-24 | End: 2019-08-25

## 2019-08-24 RX ORDER — CEFAZOLIN SODIUM 1 G/3ML
1 INJECTION, POWDER, FOR SOLUTION INTRAMUSCULAR; INTRAVENOUS SEE ADMIN INSTRUCTIONS
Status: DISCONTINUED | OUTPATIENT
Start: 2019-08-24 | End: 2019-08-24

## 2019-08-24 RX ORDER — CEFAZOLIN SODIUM 1 G/50ML
2 SOLUTION INTRAVENOUS
Status: DISCONTINUED | OUTPATIENT
Start: 2019-08-24 | End: 2019-08-24

## 2019-08-24 RX ORDER — NALOXONE HYDROCHLORIDE 0.4 MG/ML
.1-.4 INJECTION, SOLUTION INTRAMUSCULAR; INTRAVENOUS; SUBCUTANEOUS
Status: DISCONTINUED | OUTPATIENT
Start: 2019-08-24 | End: 2019-08-24

## 2019-08-24 RX ORDER — POTASSIUM CHLORIDE 7.45 MG/ML
10 INJECTION INTRAVENOUS
Status: DISCONTINUED | OUTPATIENT
Start: 2019-08-24 | End: 2019-08-28 | Stop reason: HOSPADM

## 2019-08-24 RX ORDER — SODIUM CHLORIDE, SODIUM LACTATE, POTASSIUM CHLORIDE, CALCIUM CHLORIDE 600; 310; 30; 20 MG/100ML; MG/100ML; MG/100ML; MG/100ML
INJECTION, SOLUTION INTRAVENOUS CONTINUOUS
Status: DISCONTINUED | OUTPATIENT
Start: 2019-08-24 | End: 2019-08-24

## 2019-08-24 RX ORDER — DEXTROSE MONOHYDRATE 50 MG/ML
INJECTION, SOLUTION INTRAVENOUS CONTINUOUS
Status: DISCONTINUED | OUTPATIENT
Start: 2019-08-24 | End: 2019-08-25

## 2019-08-24 RX ORDER — DIPHENHYDRAMINE HYDROCHLORIDE 50 MG/ML
25 INJECTION INTRAMUSCULAR; INTRAVENOUS EVERY 6 HOURS PRN
Status: DISCONTINUED | OUTPATIENT
Start: 2019-08-24 | End: 2019-08-25

## 2019-08-24 RX ORDER — ONDANSETRON 4 MG/1
4 TABLET, ORALLY DISINTEGRATING ORAL EVERY 6 HOURS PRN
Status: DISCONTINUED | OUTPATIENT
Start: 2019-08-24 | End: 2019-08-25

## 2019-08-24 RX ORDER — ONDANSETRON 4 MG/1
4 TABLET, ORALLY DISINTEGRATING ORAL EVERY 6 HOURS PRN
Status: DISCONTINUED | OUTPATIENT
Start: 2019-08-24 | End: 2019-08-24

## 2019-08-24 RX ORDER — POTASSIUM CL/LIDO/0.9 % NACL 10MEQ/0.1L
10 INTRAVENOUS SOLUTION, PIGGYBACK (ML) INTRAVENOUS
Status: DISCONTINUED | OUTPATIENT
Start: 2019-08-24 | End: 2019-08-28 | Stop reason: HOSPADM

## 2019-08-24 RX ORDER — ONDANSETRON 4 MG/1
4 TABLET, ORALLY DISINTEGRATING ORAL EVERY 30 MIN PRN
Status: DISCONTINUED | OUTPATIENT
Start: 2019-08-24 | End: 2019-08-24 | Stop reason: HOSPADM

## 2019-08-24 RX ORDER — POTASSIUM CHLORIDE 1.5 G/1.58G
20-40 POWDER, FOR SOLUTION ORAL
Status: DISCONTINUED | OUTPATIENT
Start: 2019-08-24 | End: 2019-08-28 | Stop reason: HOSPADM

## 2019-08-24 RX ORDER — ALBUMIN, HUMAN INJ 5% 5 %
500 SOLUTION INTRAVENOUS ONCE
Status: COMPLETED | OUTPATIENT
Start: 2019-08-24 | End: 2019-08-25

## 2019-08-24 RX ORDER — CITRIC ACID/SODIUM CITRATE 334-500MG
30 SOLUTION, ORAL ORAL
Status: DISCONTINUED | OUTPATIENT
Start: 2019-08-24 | End: 2019-08-24

## 2019-08-24 RX ORDER — HYDROMORPHONE HYDROCHLORIDE 1 MG/ML
0.2 INJECTION, SOLUTION INTRAMUSCULAR; INTRAVENOUS; SUBCUTANEOUS
Status: DISCONTINUED | OUTPATIENT
Start: 2019-08-24 | End: 2019-08-25

## 2019-08-24 RX ORDER — DIPHENHYDRAMINE HCL 25 MG
25 CAPSULE ORAL EVERY 6 HOURS PRN
Status: DISCONTINUED | OUTPATIENT
Start: 2019-08-24 | End: 2019-08-25

## 2019-08-24 RX ORDER — ONDANSETRON 2 MG/ML
4 INJECTION INTRAMUSCULAR; INTRAVENOUS EVERY 30 MIN PRN
Status: DISCONTINUED | OUTPATIENT
Start: 2019-08-24 | End: 2019-08-24 | Stop reason: HOSPADM

## 2019-08-24 RX ORDER — AZITHROMYCIN 500 MG/1
500 INJECTION, POWDER, LYOPHILIZED, FOR SOLUTION INTRAVENOUS
Status: DISCONTINUED | OUTPATIENT
Start: 2019-08-24 | End: 2019-08-24

## 2019-08-24 RX ORDER — ONDANSETRON 2 MG/ML
4 INJECTION INTRAMUSCULAR; INTRAVENOUS EVERY 6 HOURS PRN
Status: DISCONTINUED | OUTPATIENT
Start: 2019-08-24 | End: 2019-08-24

## 2019-08-24 RX ORDER — CALCIUM CHLORIDE 100 MG/ML
INJECTION INTRAVENOUS; INTRAVENTRICULAR PRN
Status: DISCONTINUED | OUTPATIENT
Start: 2019-08-24 | End: 2019-08-24

## 2019-08-24 RX ORDER — METHYLERGONOVINE MALEATE 0.2 MG/ML
INJECTION INTRAVENOUS PRN
Status: DISCONTINUED | OUTPATIENT
Start: 2019-08-24 | End: 2019-08-24

## 2019-08-24 RX ORDER — POTASSIUM CHLORIDE 29.8 MG/ML
20 INJECTION INTRAVENOUS
Status: DISCONTINUED | OUTPATIENT
Start: 2019-08-24 | End: 2019-08-28 | Stop reason: HOSPADM

## 2019-08-24 RX ORDER — HYDROMORPHONE HYDROCHLORIDE 1 MG/ML
.3-.5 INJECTION, SOLUTION INTRAMUSCULAR; INTRAVENOUS; SUBCUTANEOUS EVERY 5 MIN PRN
Status: DISCONTINUED | OUTPATIENT
Start: 2019-08-24 | End: 2019-08-24 | Stop reason: HOSPADM

## 2019-08-24 RX ORDER — FENTANYL CITRATE 50 UG/ML
25-50 INJECTION, SOLUTION INTRAMUSCULAR; INTRAVENOUS
Status: DISCONTINUED | OUTPATIENT
Start: 2019-08-24 | End: 2019-08-24 | Stop reason: HOSPADM

## 2019-08-24 RX ORDER — SODIUM CHLORIDE 9 MG/ML
INJECTION, SOLUTION INTRAVENOUS CONTINUOUS PRN
Status: DISCONTINUED | OUTPATIENT
Start: 2019-08-24 | End: 2019-08-24

## 2019-08-24 RX ORDER — MAGNESIUM SULFATE HEPTAHYDRATE 40 MG/ML
4 INJECTION, SOLUTION INTRAVENOUS EVERY 4 HOURS PRN
Status: DISCONTINUED | OUTPATIENT
Start: 2019-08-24 | End: 2019-08-28 | Stop reason: HOSPADM

## 2019-08-24 RX ORDER — ONDANSETRON 2 MG/ML
INJECTION INTRAMUSCULAR; INTRAVENOUS PRN
Status: DISCONTINUED | OUTPATIENT
Start: 2019-08-24 | End: 2019-08-24

## 2019-08-24 RX ORDER — DEXTROSE MONOHYDRATE 50 MG/ML
INJECTION, SOLUTION INTRAVENOUS CONTINUOUS
Status: DISCONTINUED | OUTPATIENT
Start: 2019-08-24 | End: 2019-08-24

## 2019-08-24 RX ORDER — SODIUM CHLORIDE 9 MG/ML
INJECTION, SOLUTION INTRAVENOUS CONTINUOUS
Status: DISCONTINUED | OUTPATIENT
Start: 2019-08-24 | End: 2019-08-25

## 2019-08-24 RX ORDER — SODIUM CHLORIDE, SODIUM LACTATE, POTASSIUM CHLORIDE, CALCIUM CHLORIDE 600; 310; 30; 20 MG/100ML; MG/100ML; MG/100ML; MG/100ML
INJECTION, SOLUTION INTRAVENOUS CONTINUOUS
Status: DISCONTINUED | OUTPATIENT
Start: 2019-08-24 | End: 2019-08-24 | Stop reason: HOSPADM

## 2019-08-24 RX ORDER — POTASSIUM CHLORIDE 1500 MG/1
20-40 TABLET, EXTENDED RELEASE ORAL
Status: DISCONTINUED | OUTPATIENT
Start: 2019-08-24 | End: 2019-08-28 | Stop reason: HOSPADM

## 2019-08-24 RX ADMIN — PHENYLEPHRINE HYDROCHLORIDE 100 MCG: 10 INJECTION INTRAVENOUS at 00:31

## 2019-08-24 RX ADMIN — PHENYLEPHRINE HYDROCHLORIDE 200 MCG: 10 INJECTION INTRAVENOUS at 00:10

## 2019-08-24 RX ADMIN — CALCIUM CHLORIDE 500 MG: 100 INJECTION, SOLUTION INTRAVENOUS at 01:43

## 2019-08-24 RX ADMIN — SODIUM CHLORIDE 500 ML: 9 INJECTION, SOLUTION INTRAVENOUS at 14:20

## 2019-08-24 RX ADMIN — CALCIUM CHLORIDE 500 MG: 100 INJECTION, SOLUTION INTRAVENOUS at 01:48

## 2019-08-24 RX ADMIN — NOREPINEPHRINE BITARTRATE 0.03 MCG/KG/MIN: 1 INJECTION INTRAVENOUS at 00:49

## 2019-08-24 RX ADMIN — PHENYLEPHRINE HYDROCHLORIDE 100 MCG: 10 INJECTION INTRAVENOUS at 00:13

## 2019-08-24 RX ADMIN — SENNOSIDES AND DOCUSATE SODIUM 1 TABLET: 8.6; 5 TABLET ORAL at 20:04

## 2019-08-24 RX ADMIN — SUGAMMADEX 200 MG: 100 INJECTION, SOLUTION INTRAVENOUS at 02:20

## 2019-08-24 RX ADMIN — Medication 5 MG: at 00:10

## 2019-08-24 RX ADMIN — FENTANYL CITRATE 50 MCG: 50 INJECTION, SOLUTION INTRAMUSCULAR; INTRAVENOUS at 00:27

## 2019-08-24 RX ADMIN — PHENYLEPHRINE HYDROCHLORIDE 200 MCG: 10 INJECTION INTRAVENOUS at 00:40

## 2019-08-24 RX ADMIN — Medication 5 MG: at 00:28

## 2019-08-24 RX ADMIN — PHENYLEPHRINE HYDROCHLORIDE 200 MCG: 10 INJECTION INTRAVENOUS at 00:44

## 2019-08-24 RX ADMIN — IBUPROFEN 800 MG: 400 TABLET ORAL at 20:04

## 2019-08-24 RX ADMIN — METHYLERGONOVINE MALEATE 200 MCG: 0.2 INJECTION INTRAMUSCULAR; INTRAVENOUS at 01:12

## 2019-08-24 RX ADMIN — PHENYLEPHRINE HYDROCHLORIDE 100 MCG: 10 INJECTION INTRAVENOUS at 01:02

## 2019-08-24 RX ADMIN — OXYCODONE HYDROCHLORIDE 5 MG: 5 TABLET ORAL at 20:04

## 2019-08-24 RX ADMIN — PHENYLEPHRINE HYDROCHLORIDE 100 MCG: 10 INJECTION INTRAVENOUS at 00:41

## 2019-08-24 RX ADMIN — PHENYLEPHRINE HYDROCHLORIDE 200 MCG: 10 INJECTION INTRAVENOUS at 00:28

## 2019-08-24 RX ADMIN — PHENYLEPHRINE HYDROCHLORIDE 100 MCG: 10 INJECTION INTRAVENOUS at 00:59

## 2019-08-24 RX ADMIN — DEXTROSE MONOHYDRATE: 50 INJECTION, SOLUTION INTRAVENOUS at 05:29

## 2019-08-24 RX ADMIN — KETOROLAC TROMETHAMINE 30 MG: 30 INJECTION, SOLUTION INTRAMUSCULAR at 13:36

## 2019-08-24 RX ADMIN — KETOROLAC TROMETHAMINE 30 MG: 30 INJECTION, SOLUTION INTRAMUSCULAR at 08:05

## 2019-08-24 RX ADMIN — ACETAMINOPHEN 975 MG: 325 TABLET ORAL at 20:03

## 2019-08-24 RX ADMIN — ONDANSETRON 4 MG: 2 INJECTION INTRAMUSCULAR; INTRAVENOUS at 02:08

## 2019-08-24 RX ADMIN — ACETAMINOPHEN 975 MG: 325 TABLET ORAL at 11:37

## 2019-08-24 RX ADMIN — PHENYLEPHRINE HYDROCHLORIDE 200 MCG: 10 INJECTION INTRAVENOUS at 00:37

## 2019-08-24 RX ADMIN — Medication: at 03:50

## 2019-08-24 RX ADMIN — PHENYLEPHRINE HYDROCHLORIDE 100 MCG: 10 INJECTION INTRAVENOUS at 01:05

## 2019-08-24 RX ADMIN — SODIUM CHLORIDE: 9 INJECTION, SOLUTION INTRAVENOUS at 02:14

## 2019-08-24 RX ADMIN — PHENYLEPHRINE HYDROCHLORIDE 200 MCG: 10 INJECTION INTRAVENOUS at 00:56

## 2019-08-24 RX ADMIN — PHENYLEPHRINE HYDROCHLORIDE 200 MCG: 10 INJECTION INTRAVENOUS at 00:52

## 2019-08-24 RX ADMIN — PHENYLEPHRINE HYDROCHLORIDE 200 MCG: 10 INJECTION INTRAVENOUS at 00:47

## 2019-08-24 RX ADMIN — HYDROMORPHONE HYDROCHLORIDE 0.5 MG: 1 INJECTION, SOLUTION INTRAMUSCULAR; INTRAVENOUS; SUBCUTANEOUS at 03:47

## 2019-08-24 RX ADMIN — VECURONIUM BROMIDE 3 MG: 1 INJECTION, POWDER, LYOPHILIZED, FOR SOLUTION INTRAVENOUS at 00:45

## 2019-08-24 RX ADMIN — TRANEXAMIC ACID 1 G: 100 INJECTION, SOLUTION INTRAVENOUS at 01:52

## 2019-08-24 RX ADMIN — Medication 2 G: at 01:07

## 2019-08-24 RX ADMIN — SODIUM CHLORIDE, POTASSIUM CHLORIDE, SODIUM LACTATE AND CALCIUM CHLORIDE: 600; 310; 30; 20 INJECTION, SOLUTION INTRAVENOUS at 00:49

## 2019-08-24 ASSESSMENT — ACTIVITIES OF DAILY LIVING (ADL)
ADLS_ACUITY_SCORE: 13
ADLS_ACUITY_SCORE: 12
ADLS_ACUITY_SCORE: 13

## 2019-08-24 NOTE — ANESTHESIA POSTPROCEDURE EVALUATION
Patient: Debbi Grossman    Procedure(s):  CYSTOSCOPY  REPAIR, BLADDER  EXPLORATORY LAPAROTOMY WITH REPEAT EXPLORATORY LAPAROTOMY WITH BLADDER REPAIR  CYSTOSCOPY WITH BILATERAL RETROGRADES/ BILATERAL STENT PLACEMENT AND REMOVAL/ CYSTOGRAM/ BLADDER REPAIR  Cystoscopy, cystogram, combined    Diagnosis:Blood In Urine/mcmahon catheter s/p C Section  Diagnosis Additional Information: injury to bladder during C Section needing repair    Anesthesia Type:  General, RSI, ETT    Note:  Anesthesia Post Evaluation    Patient location during evaluation: PACU  Patient participation: Able to fully participate in evaluation  Level of consciousness: sleepy but conscious and responsive to verbal stimuli  Pain management: adequate  Airway patency: patent  Cardiovascular status: acceptable and hemodynamically stable  Respiratory status: acceptable and unassisted  Hydration status: acceptable  PONV: none     Anesthetic complications: None          Last vitals:  Vitals:    08/23/19 2054 08/23/19 2100 08/24/19 0250   BP: 104/62 98/59 109/77   Pulse:   98   Resp: 16 16 16   Temp:   35.8  C (96.5  F)   SpO2: 94%  100%         Electronically Signed By: Awais Marti MD  August 24, 2019  3:15 AM

## 2019-08-24 NOTE — OP NOTE
Procedure Date: 2019      PREOPERATIVE DIAGNOSES:   1.  Gross hematuria.   2.  Status post primary  section.      POSTOPERATIVE DIAGNOSIS:   Bladder dome injury.        SURGEON: Natalee Farris MD      INTRAOPERATIVE CONSULTATION:  Anjel Mireles MD of Urology.        ASSISTANT:  Julisa Joel's assistance was needed for the complex nature of this case.      ANESTHESIA:  General.      ESTIMATED BLOOD LOSS:  2500 mL.      FLUIDS:  3400 mL crystalloid, 4 units of packed red blood cells, 1unit FFP, 500 mL albumin.      FINDINGS:     1.  Gross hematuria with large clot noted at the dome of the bladder.  Bilateral ureters intact.  Repeat cystoscopy with Dr. Mireles.   2.  Large defect noted at the dome of the bladder on repeat exploratory laparotomy as well as a cervical laceration.      DRAINS:   1.  Medina catheter.   2.  TYREL drain.      SPECIMEN:  None.      INDICATIONS:  Debbi is a 33-year-old P1 who underwent primary  section for failure to descend and fetal intolerance of labor with myself earlier this evening.  She delivered a healthy male infant with a weight of 8 pounds 3 ounces and Apgars of 8 and 9.  While in PACU, Debbi was noted to have gross hematuria within her Medina catheter.  Nurses attempted to replace the Medina catheter and to ensure that it was in proper bladder placement and received approximately 400 mL of hunter bleeding returned and the catheter.  I then returned for further evaluation and based on appearance of urine, the decision was made to proceed back to the operating room for further evaluation.        DESCRIPTION OF PROCEDURE:  Upon arrival to the operating room, Debbi was prepared and draped in the normal sterile fashion in dorsal lithotomy position.  A timeout was performed to identify correct patient and procedure.  Cystoscopy was then performed with definite blood noted to be within the bladder as well as large clot noted at the dome.  Given the amount of  blood and difficulty distending the bladder, we had a high suspicion of a bladder injury and decided to proceed with exploratory laparotomy.  We then positioned Debbi, and redraped, regowned and gloved and proceeded with exploratory laparotomy.  Incision was opened using the scalpel.  The fascial incision was similarly opened as well as peritoneum.  Minimal bleeding was noted upon initial entry into the pelvic cavity.  We did attempt to place an Salomon retractor, but ultimately removed it due to lack of visualization.  We inspected the uterine hysterotomy site which was found to be hemostatic.  There was no obvious bladder injury noted at this time.  At this time, we retrograde filled the bladder through the Medina catheter with methylene blue dyed sterile saline.  We distended the bladder with approximately 240 mL of tinted fluid with no blue extravasation into the pelvic cavity.  We then tried to empty the bladder but encountered significant resistance through both a Medina catheter as well as the 60 mL syringe.  We then manually tried to empty the bladder with gentle pressure.  Small amounts of blue fluid were noted to come both from the catheter as well as around the Medina catheter.  We then distended the bladder an additional time with again no extravasation.  At this time, the uterus was exteriorized for better visualization.  A small area of likely hematoma was noted in the broad ligament, but this was found to be stable.  Again, close inspection revealed no obvious extravasation from the hysterotomy site and no obvious bladder injury.  At this time, decision was made to consult Urology, given ongoing hunter bleeding from the Medina catheter as well as difficulty emptying the bladder of the blue dyed field.        While waiting for Dr. Mireles's arrival, we did opt to close both the fascia as well as the skin incision.  We once again redraped and prepared for his intraoperative consultation.  Please refer to   Aline's operative note regarding details of his portion of the procedure.  In short, he performed a cystoscopy with similar findings of active bleeding within the bladder as well as large blood clot.  He was able to place bilateral ureteral stents without difficulty as well as perform retrograde cystogram which showed ureters to be intact.  There was some question of extravasation of contrast dye within the uterine cavity.  With removal of the cystoscope, a large amount of bright red bleeding was noted through the cystoscope sheath.  Given ongoing bleeding as well as some confusion as to the potential extent of injury and location of the injury, decision was made to repeat exploratory laparotomy.  We once again changed both drapes and gowns, and proceeded to open the previously closed Pfannenstiel skin incision.  Fascia was similarly opened and at this time more bleeding as well as cystoscopy fluid was noted to be within the pelvic cavity.  With pressure on the bladder, we were able to see fluid coming from what appeared to be the hysterotomy site.  We then made the decision to open the hysterotomy site.  With further exploration, I discovered a fairly large defect in the dome of the bladder.  I suspect that this was initially a small injury sustained at the time of  section, which was eventually distended and extended with repetitive distention of the bladder as well as cystoscopy.  We opened the entire hysterotomy site and were able to separate the posterior bladder wall from the lower uterine segment.  A right-sided cervical extension laceration extension was noted and this was repaired with a running locked suture of 0 Vicryl.  After completely clearing the posterior wall of the bladder from the lower uterine segment, the hysterotomy site was once again repaired with a running #0 Vicryl in a running locked fashion.  A second imbricating layer was also placed.  Excellent hemostasis noted.        At this  time, attention was turned to the bladder and Dr. Mireles repaired the dome of the bladder was a 3-0 Vicryl in several layers.  Again, please refer to his operative dictation regarding details of this procedure.  Following completion of the bladder closure, sterile saline was placed into the bladder with small amount of extravasation noted.  Reinforcement figure-of-eight sutures were placed with significant improvement.  Given the drainage, a decision was made to place a Janes-Morin drain into the pelvis.  Uterine bleeding was found to be minimal.  Fundus was firm after receiving IM Methergine at the start of the second exploratory laparotomy.  At this time, the fascia was once again closed using an #0 Vicryl and skin was closed using a 4-0 Monocryl suture.  Dr. Mireles did remove both ureteral stents given obvious patency during the time of retrograde cystogram and given the obvious bladder defect which was not repaired.        During the procedure, Debbi was found to have a significant amount of bleeding with an estimated blood loss of 2500.  Intraoperatively, she did receive 4 units of packed red blood cells, 1 unit of FFP, replacement calcium, 3400 mL of crystalloid fluid, 500 mL of albumin and was initially placed on both Luis Alberto-Synephrine as well as norepinephrine drips.  Intraoperative hemoglobin following her second unit of blood transfusion was 7.8 grams per deciliter.        Upon completion of the procedure, Anesthesia had already weaned Debbi from the Luis Alberto-Synephrine drip and was in the process of weaning from the norepinephrine.  Plan is for extubation in the PACU with overnight observation in the Intensive Care Unit.  Given Debbi's previous episode of atrial fibrillation, the initial plan had been for recovery in the Cardiac Special Care Unit and likely this will still be our plan following her ICU stay.         MINH WEST MD             D: 08/24/2019   T: 08/24/2019   MT: IMMANUEL      Name:     DIMAS  DIMPLE   MRN:      [id number]        Account:        [id number]   :      1985           Procedure Date: 2019      Document: [id number]       cc: Natalee Farris MD

## 2019-08-24 NOTE — ANESTHESIA PROCEDURE NOTES
ARTERIAL LINE PROCEDURE NOTE:   Pre-Procedure  Performed by Awais Marti MD  Location: pre-op      Pre-Anesthestic Checklist: patient identified, IV checked, site marked, risks and benefits discussed, informed consent, monitors and equipment checked, pre-op evaluation, at physician/surgeon's request and post-op pain management    Timeout  Correct Patient: Yes   Correct Procedure: Yes   Correct Site: Yes   Correct Laterality: N/A   Correct Position: Yes   Site Marked: N/A, Yes   .   Procedure Documentation  Procedure: arterial line    Supine  Insertion Site:left, radial.Skin infiltrated with 0.5 mL of 1% lidocaine. Injection technique: Seldinger Technique and Fabina's test completed  .  .  Patient Prep;all elements of maximal sterile barrier technique followed, mask, hat, sterile gown, sterile gloves, draped, hand hygiene, chlorhexidine gluconate and isopropyl alcohol    Assessment/Narrative    Catheter: 20 gauge, 1.75 in/4.5 cm quick cath (integral wire)     Secured by other  Tegaderm dressing used.    Arterial waveform: Yes     Comments:  Tiffany placed shelter into case for HD monitoring

## 2019-08-24 NOTE — PROGRESS NOTES
ICU Note:    Patient transferred to ICU postoperatively for monitoring in event had difficulty with tachycardia or hemorrhagic shock secondary to acute blood loss anemia. However, patient has been extremely stable. Patient feels well today and has no complaints. Can transfer out of ICU. Hospitalist has been consulted previously. Please call if she becomes critically ill again.    Laura Sanford MD

## 2019-08-24 NOTE — OP NOTE
PATIENT NAME:Debbi Grossman  MEDICAL RECORD NUMBER: [id number]  SURGEON: Pedro Luis Mireles MD  Co Surgeon Dr. Natalee Farris  PREOPERATIVE DIAGNOSIS: hematuria s/p c section, intra operative consult by OB/GYN  POSTOPERATIVE DIAGNOSIS: Bladder laceration during the  with the bladder and communication with a GYN tract.  Bilateral retrogrades were normal stents were temporarily placed and removed.  PROCEDURE PERFORMED: Cystoscopy, hysteroscopy, bilateral retrograde pyelograms bilateral ureteral stent placement closure of cystotomy, clot evacuation, ex-lap  ANESTHESIA: General.   COMPLICATIONS: None.   OPERATIVE FINDINGS: Large cystotomy had been previously closed to the uterus.  Clot evacuated to organs were reclosed separately, bilateral ureter ureters were intact with no sign of injury.  SPECIMEN: none  EBL (mL): per Dr. Farris note  INDICATIONS FOR PROCEDURE: Debbi Grossman is a 33 year old female with an emergent .  Postoperatively he was noted to have large amounts of gross blood in her Medina catheter.  She was taken to the operating room by Dr. Farris as team and I was called for an intraoperative consult.  DETAILS OF PROCEDURE: Dr. Farris had just explored the abdomen prior to my arrival.  She noted that the bladder distended without difficulty she injected methylene blue and noticed no blue fluid in the operative field.  She had tried flexible cystoscopy but was unable to make sense due to the amount of blood in the bladder.    We repositioned the patient in the lithotomy position and I placed a 25 [place] cystoscope.  External genitalia was very edematous and soon upon entry into the bladder large amounts of blood were encountered and was very challenging to discern the anatomy.  I did see what appeared to be normal normal urothelium.  However there also appeared to be a large vacuous area and it was unclear whether it was a very distended bladder with a large clot or whether there was  some communication with the GYN tract.  Upon manual exam there was a clear bridge of tissue between cystoscope and the anterior vaginal wall.  She had a cystogram which showed the bladder shape in the pelvis but there appeared to be a large amount of extravasation from the dome of the bladder.  The ureteral orifices were eventually identified through all the clot and blood.  A Retrograde was obtained first through a flimsy open-ended catheter that did not pass easily through the ureter, so I got a tiger tail catheter which worked much better.  I initially believed the ureter may be obstructed on the right side however the wire passed up into the renal pelvis on the right side with some difficulty due to the tortuosity of the ureter.  There was some hydronephrosis on the right side though this is commonly seen in gravid women.    Because of my concern that there was a large hole in the bladder requiring re-exploration and I could feel her abdomen was distending during the course of the cystoscopy.  We decided to place ureteral stents for ease and identification.  Placed a 6 x 26 double-J stent on the right side.  I then identified the left ureteral orifice and intubated it with an open-ended catheter.  I shot a retrograde pyelogram this appeared to be completely normal nondilated no evidence of contrast extravasation.  I placed an open-ended 6 Korean ureteral catheter and introduced into the Medina.    There was noted upon breaking the scope throughout the course of the procedure copious amounts of hunter blood coming from the Medina or the scope sheath.    I replaced the Medina and then we determined to reexplore the operative field.    We reprepped and draped and opened the Pfannenstiel incision.  We dissected down and discovered eventually after opening the  incision that the bladder had been inadvertently 7 to the uterus.  There was a large defect involving the entire dome of the bladder. Through dissection  were able to separate the uterus and cervix from the bladder.  The bleeding was coming from the cuff of the cervix and the uterus, which had been in communication with the bladder.  Dr. Farris oversewed this area and hemostasis appeared to be much better.  We evacuated a large amount of blood from the bladder and then closed the bladder.  We closed with 3-0 Vicryl with multiple running and interrupted stitches.  The posterior bladder wall/dome most adjacent to the vaginal wall was very thin.  There were several holes that formed when the tissue tore in trying to close the bladder.  Eventually it appeared largely watertight with 120 ccs of fluid injected.  The return of urine from the Medina was clear.    The ureteral stents were removed at the conclusion of the case.  Will need to leave the Medina in for at least 2 weeks and get a cystogram prior to removal    Pedro Luis Mireles MD  Department of Urology Staff  [facility]    Note, dictation software may have been used for this note and the content was not proofread for accuracy

## 2019-08-24 NOTE — PROGRESS NOTES
[facility] Obstetrics Post-Op / Progress Note         Assessment and Plan:    Assessment:   Post-operative day #1  Low transverse primary  section  L&D complications: Atrial fibrillation on admission.  Take-back for bladder repair and repair of cervical laceration      Fluids: 3400mL crystalloid, 4U PRBC, 1 FFP, 500mL albumin  Last Hgb 11.6 at 3am  No longer needing pressors  Medina in place         Plan:   Appreciate ICU management, anticipate transfer to the postpartum unit when she no longer meets ICU criteria.  Medina to remain in place per Urology  Routine PP cares           Interval History:   Debbi feels very tired but feels happy this AM. The  is at bedside and she is attempting breastfeeding. Denies chest pain or shortness of breath.           Significant Problems:    As noted above          Review of Systems:    The Review of Systems is negative other than noted in the HPI          Medications:     Current Facility-Administered Medications   Medication     [START ON 2019] acetaminophen (TYLENOL) tablet 650 mg     acetaminophen (TYLENOL) tablet 975 mg     [START ON 2019] bisacodyl (DULCOLAX) Suppository 10 mg     dextrose 5% in lactated ringers infusion     dextrose 5% infusion     dextrose 5% infusion     diphenhydrAMINE (BENADRYL) capsule 25 mg    Or     diphenhydrAMINE (BENADRYL) injection 25 mg     hydrocortisone 2.5 % cream     HYDROmorphone (DILAUDID) PCA 1 mg/mL OPIOID NAIVE     HYDROmorphone (PF) (DILAUDID) injection 0.2 mg     ibuprofen (ADVIL/MOTRIN) tablet 800 mg     ketorolac (TORADOL) injection 30 mg     lactated ringers BOLUS 1,000 mL     lanolin ointment     lidocaine (LMX4) cream     lidocaine 1 % 0.1-1 mL     naloxone (NARCAN) injection 0.1-0.4 mg     naloxone (NARCAN) injection 0.1-0.4 mg     No MMR Needed -  Assessment: Patient does not need MMR vaccine     NO Rho (D) immune globulin (RhoGam) needed - mother Rh POSITIVE     No Tdap Needed - Assessment:  Patient does not need Tdap vaccine     ondansetron (ZOFRAN-ODT) ODT tab 4 mg    Or     ondansetron (ZOFRAN) injection 4 mg     oxyCODONE (ROXICODONE) tablet 5-10 mg     oxytocin (PITOCIN) 30 units in 500 mL 0.9% NaCl infusion     oxytocin (PITOCIN) 30 units in 500 mL 0.9% NaCl infusion     oxytocin (PITOCIN) injection 10 Units     senna-docusate (SENOKOT-S/PERICOLACE) 8.6-50 MG per tablet 1 tablet    Or     senna-docusate (SENOKOT-S/PERICOLACE) 8.6-50 MG per tablet 2 tablet     simethicone (MYLICON) chewable tablet 80 mg     sodium chloride (PF) 0.9% PF flush 3 mL     sodium chloride (PF) 0.9% PF flush 3 mL     [START ON 8/25/2019] sodium phosphate (FLEET ENEMA) 1 enema     tranexamic acid (CYKLOKAPRON) Bolus 1g vial attach to NaCl 50 or 100 mL bag ADULT             Physical Exam:     Patient Vitals for the past 12 hrs:   BP Temp Temp src Pulse Heart Rate Resp SpO2   08/24/19 0815 103/68 -- -- -- 91 10 97 %   08/24/19 0800 -- 97.9  F (36.6  C) Axillary -- 101 12 97 %   08/24/19 0745 -- -- -- -- 108 15 99 %   08/24/19 0730 -- -- -- -- 104 11 99 %   08/24/19 0715 -- -- -- -- 97 11 97 %   08/24/19 0700 -- -- -- -- 91 14 96 %   08/24/19 0645 -- -- -- -- 97 11 99 %   08/24/19 0630 -- -- -- -- 92 11 97 %   08/24/19 0615 -- -- -- -- -- 12 --   08/24/19 0600 -- -- -- -- 88 8 99 %   08/24/19 0545 -- -- -- -- 101 13 94 %   08/24/19 0515 112/70 -- -- 89 90 9 98 %   08/24/19 0500 110/76 -- -- 85 82 11 93 %   08/24/19 0445 110/74 -- -- 81 -- -- --   08/24/19 0430 -- 97.5  F (36.4  C) Axillary -- 90 13 98 %   08/24/19 0400 106/75 -- -- 101 88 10 98 %   08/24/19 0350 106/67 -- -- 95 91 13 98 %   08/24/19 0340 114/75 96.5  F (35.8  C) -- 90 92 18 100 %   08/24/19 0330 117/70 -- -- 96 93 15 99 %   08/24/19 0320 105/73 96.9  F (36.1  C) -- 96 99 9 100 %   08/24/19 0310 107/79 -- -- 101 97 12 100 %   08/24/19 0300 103/75 -- -- 91 93 9 100 %   08/24/19 0250 109/77 96.5  F (35.8  C) -- 98 100 16 100 %   08/23/19 2100 98/59 -- -- -- 93  16 --   TYREL Drain: 120 ml since placement  Urine output: 420 ml since surgery. Remains blood tinged.  GEN: NAD  CV: tachycardic with regular rhythm  RESP: CTAB  GI: soft, non-distended. Incision is clean/dry and intact. Fundus is firm and below the umbilicus.  : labial edema is unchanged from immediately post-operatively  Ext: non-tender          Data:     Hemoglobin   Date Value Ref Range Status   08/24/2019 11.6 (L) 11.7 - 15.7 g/dL Final   08/24/2019 9.5 (L) 11.7 - 15.7 g/dL Final   08/24/2019 9.5 (L) 11.7 - 15.7 g/dL Final   08/24/2019 7.8 (L) 11.7 - 15.7 g/dL Final   08/23/2019 9.6 (L) 11.7 - 15.7 g/dL Final     -    Julisa Joel MD

## 2019-08-24 NOTE — PROGRESS NOTES
Admitted from OR around 4:30am today. Soft BP, intensivist updated on pt's BP, ok if it is not sustained, per intensivist. Pt sBP drifts down to 80s, but goes right back to 90's- 100s. 2 LNC, sats high 90s. Urine output reddish/pink. D 5 125 ml/hr. A&O, lethargic. Lungs clear. Hgb 11.6, Lactic 4.1 this am. Uterus, lochia monitored by L&D RN. Labia swollen, ice packs placed. A line patent. PCA used as needed. Will cont to closely monitor.

## 2019-08-24 NOTE — PROVIDER NOTIFICATION
Soft BP notified to Dr. Sanford. No intervention since pt not symptomatic. Will continue to monitor.

## 2019-08-24 NOTE — PLAN OF CARE
Dr. Farris paged regarding mcmahon catheter- MD asking RN to verify placement. RN removed mcmahon and placed new catheter- still receiving hunter blood back in catheter. Mcmahon that was removed had 400 of blood in urine bag. New mcmahon has about 100ml of blood since insertion. MD will page RN back regarding continuing plan of care for patient.   MD called back and wants patient to go to Main OR for a cystoscope. Charge RN and MDA notified.

## 2019-08-24 NOTE — PROGRESS NOTES
[id number]  A/OX4. LS clear. On RA. SR/ST with soft BP. Medina patent with adequate UOP. Purposeful movements on extremities. Incision on abdomen covered with dressing. TYREL on abdomen patent. PIVx3. Skin grossly intact otherwise. Family updated at bedside. Afebrile. Will dangle at bedside again and transfer out of ICU late this afternoon.

## 2019-08-24 NOTE — PROGRESS NOTES
[facility]    Cardiology Progress Note     Assessment & Plan   Debbi Grossman is a 33 year old female who was admitted on 8/23/2019.     Assessment:  The patient had prepartum atrial fibrillation.  She was on diltiazem and on amiodarone.  She converted to sinus rhythm during labor.  She had an extensive surgery for bladder repair and cervical laceration.    Recently she has struggled with lower blood pressures and sinus tachycardia.  She also has presyncopal episodes.    Plan:  Patient remains in sinus rhythm.      Echocardiogram performed yesterday notes a normal LVEF with no structural abnormalities.    I do not think that her current mild hypotension, presyncope and sinus tachycardia are related to a ongoing cardiac process.  This is likely reflective of her blood loss, volume depletion and vaso-plegia from the surgery and all the recent stress the patient is undergone.    I do not have any additional recommendations regarding cardiovascular standpoint.  However it would be reasonable to evaluate for ongoing blood loss, and/or infection if the patient continues to have presyncopal symptoms.      Alejandro Patino MD    Interval History   Patient maintain sinus rhythm.  She has been mildly presyncopal and orthostatic.    Physical Exam   Temp: 98.3  F (36.8  C) Temp src: Oral BP: 97/61 Pulse: 98 Heart Rate: 98 Resp: 15 SpO2: 100 % O2 Device: None (Room air) Oxygen Delivery: 2 LPM  Vitals:    08/23/19 0200   Weight: 72.6 kg (160 lb)     Vital Signs with Ranges  Temp:  [96.5  F (35.8  C)-98.3  F (36.8  C)] 98.3  F (36.8  C)  Pulse:  [] 98  Heart Rate:  [] 98  Resp:  [8-24] 15  BP: ()/(52-79) 97/61  MAP:  [44 mmHg-114 mmHg] 58 mmHg  Arterial Line BP: ()/(38-75) 84/45  SpO2:  [93 %-100 %] 100 %  I/O last 3 completed shifts:  In: 9900.58 [P.O.:480; I.V.:7419.58]  Out: 5183 [Urine:1745; Drains:128; Blood:3310]    GENERAL APPEARANCE: Alert and oriented x3. No acute  distress.  SKIN: Inspection of the skin reveals no rashes, ulcerations, warm, dry  NECK: Supple and symmetric.   Normal JVP  LUNGS: Clear breath sounds throughout bilaterally, no wheezes, no rhonchi  CARDIOVASCULAR: S1, S2, regular rate and rhythm without any murmurs, gallops, rubs. The carotid pulses were normal and 2+ bilaterally without bruits. Peripheral pulses were 2+ and symmetric.  ABDOMEN: Soft, non-tender, non-distended with normal bowel sounds.  No ascites noted.  EXTREMITIES: No edema.  NEUROLOGIC:  Normal mood and affect.  Sensation to touch was normal.    Medications     D5W 125 mL/hr at 08/24/19 0815     HYDROmorphone Stopped (08/24/19 1330)     - MEDICATION INSTRUCTIONS -       NO Rho (D) immune globulin (RhoGam) needed - mother Rh POSITIVE       - MEDICATION INSTRUCTIONS -       oxytocin in 0.9% NaCl Stopped (08/23/19 2332)     oxytocin in 0.9% NaCl       sodium chloride 10 mL/hr at 08/24/19 0830       sodium chloride 0.9%  500 mL Intravenous Once     acetaminophen  975 mg Oral Q8H     ketorolac  30 mg Intravenous Q6H     sodium chloride (PF)  3 mL Intracatheter Q8H       Data   Results for orders placed or performed during the hospital encounter of 08/23/19 (from the past 24 hour(s))   EKG 12-lead, tracing only   Result Value Ref Range    Interpretation ECG Click View Image link to view waveform and result    Basic metabolic panel   Result Value Ref Range    Sodium 139 133 - 144 mmol/L    Potassium 4.3 3.4 - 5.3 mmol/L    Chloride 110 (H) 94 - 109 mmol/L    Carbon Dioxide 23 20 - 32 mmol/L    Anion Gap 6 3 - 14 mmol/L    Glucose 120 (H) 70 - 99 mg/dL    Urea Nitrogen 10 7 - 30 mg/dL    Creatinine 0.57 0.52 - 1.04 mg/dL    GFR Estimate >90 >60 mL/min/[1.73_m2]    GFR Estimate If Black >90 >60 mL/min/[1.73_m2]    Calcium 7.8 (L) 8.5 - 10.1 mg/dL   CBC with platelets   Result Value Ref Range    WBC 21.4 (H) 4.0 - 11.0 10e9/L    RBC Count 3.18 (L) 3.8 - 5.2 10e12/L    Hemoglobin 10.0 (L) 11.7 - 15.7  g/dL    Hematocrit 29.8 (L) 35.0 - 47.0 %    MCV 94 78 - 100 fl    MCH 31.4 26.5 - 33.0 pg    MCHC 33.6 31.5 - 36.5 g/dL    RDW 13.4 10.0 - 15.0 %    Platelet Count 177 150 - 450 10e9/L   INR   Result Value Ref Range    INR Canceled, Test credited 0.86 - 1.14   Partial thromboplastin time   Result Value Ref Range    PTT Canceled, Test credited 22 - 37 sec   CBC with platelets   Result Value Ref Range    WBC 22.4 (H) 4.0 - 11.0 10e9/L    RBC Count 2.98 (L) 3.8 - 5.2 10e12/L    Hemoglobin 9.6 (L) 11.7 - 15.7 g/dL    Hematocrit 28.0 (L) 35.0 - 47.0 %    MCV 94 78 - 100 fl    MCH 32.2 26.5 - 33.0 pg    MCHC 34.3 31.5 - 36.5 g/dL    RDW 13.5 10.0 - 15.0 %    Platelet Count 196 150 - 450 10e9/L   Fibrinogen activity   Result Value Ref Range    Fibrinogen 264 200 - 420 mg/dL   INR   Result Value Ref Range    INR 1.27 (H) 0.86 - 1.14   Partial thromboplastin time   Result Value Ref Range    PTT 34 22 - 37 sec   XR Surgery ROSARIO Fluoro L/T 5 Min w Stills    Narrative    SURGERY C-ARM FLUORO LESS THAN 5 MIN W STILLS August 24, 2019 12:50 AM      HISTORY: Cystoscopy and right stent.    COMPARISON: None.    NUMBER OF IMAGES ACQUIRED: 10.    VIEWS: One.    FLUOROSCOPY TIME: 3.1 minutes.    FINDINGS: Cystoscope, opacification of bladder and bilateral ureters.  There is mild prominence of the mid right ureter and mild right  pelvicaliectasis although the proximal right ureter is not dilated  suggesting ureteropelvic junction obstruction. Partial visualization  of right ureteral stent in the mid abdomen and low pelvis on the last  image of this exam. Opacified left pelvicalyceal system and ureter  without hydronephrosis.      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   Plasma prepare order unit   Result Value Ref Range    Blood Component Type Plasma     Units Ordered 2    Blood component   Result Value Ref Range    Unit Number [id number]     Blood Component Type  Plasma, Thawed     Division Number 00     Status of Unit Released to care unit 08/24/2019 0110     Blood Product Code P4396S87     Unit Status ISS    Blood component   Result Value Ref Range    Unit Number [id number]     Blood Component Type Plasma, Thawed     Division Number 00     Status of Unit Ready for patient 08/24/2019 0328     Blood Product Code D2359H96     Unit Status JOHANNA    ISTAT gases elec art POCT   Result Value Ref Range    pH Arterial 7.23 (L) 7.35 - 7.45 pH    pCO2 Arterial 45 35 - 45 mm Hg    pO2 Arterial 154 (H) 80 - 105 mm Hg    Bicarbonate Arterial 19 (L) 21 - 28 mmol/L    O2 Sat Arterial 99 92 - 100 %    Sodium 137 133 - 144 mmol/L    Potassium 4.7 3.4 - 5.3 mmol/L    Hemoglobin 7.8 (L) 11.7 - 15.7 g/dL    Hematocrit - POCT 23 (L) 35.0 - 47.0 %PCV   ISTAT gases elec art POCT   Result Value Ref Range    pH Arterial 7.21 (L) 7.35 - 7.45 pH    pCO2 Arterial 44 35 - 45 mm Hg    pO2 Arterial 165 (H) 80 - 105 mm Hg    Bicarbonate Arterial 18 (L) 21 - 28 mmol/L    O2 Sat Arterial 99 92 - 100 %    Sodium 136 133 - 144 mmol/L    Potassium 5.2 3.4 - 5.3 mmol/L    Hemoglobin 9.5 (L) 11.7 - 15.7 g/dL    Hematocrit - POCT 28 (L) 35.0 - 47.0 %PCV   ISTAT electrolytes POCT   Result Value Ref Range    Sodium 137 133 - 144 mmol/L    Potassium 5.2 3.4 - 5.3 mmol/L    Hemoglobin 9.5 (L) 11.7 - 15.7 g/dL    Hematocrit - POCT 28 (L) 35.0 - 47.0 %PCV   CBC with platelets   Result Value Ref Range    WBC 17.0 (H) 4.0 - 11.0 10e9/L    RBC Count 3.63 (L) 3.8 - 5.2 10e12/L    Hemoglobin 11.6 (L) 11.7 - 15.7 g/dL    Hematocrit 33.8 (L) 35.0 - 47.0 %    MCV 93 78 - 100 fl    MCH 32.0 26.5 - 33.0 pg    MCHC 34.3 31.5 - 36.5 g/dL    RDW 13.9 10.0 - 15.0 %    Platelet Count 105 (L) 150 - 450 10e9/L   Blood gas arterial with oxyhemoglobin   Result Value Ref Range    pH Arterial 7.24 (L) 7.35 - 7.45 pH    pCO2 Arterial 39 35 - 45 mm Hg    pO2 Arterial 119 (H) 80 - 105 mm Hg    Bicarbonate Arterial 17 (L) 21 - 28 mmol/L     Oxyhemoglobin Arterial 97 92 - 100 %    Base Deficit Art 10.2 mmol/L   INR   Result Value Ref Range    INR 1.23 (H) 0.86 - 1.14   Partial thromboplastin time   Result Value Ref Range    PTT 32 22 - 37 sec   Lactic acid whole blood   Result Value Ref Range    Lactic Acid 4.1 (HH) 0.7 - 2.0 mmol/L   Lactate Dehydrogenase   Result Value Ref Range    Lactate Dehydrogenase 177 81 - 234 U/L   Basic metabolic panel   Result Value Ref Range    Sodium 142 133 - 144 mmol/L    Potassium 4.9 3.4 - 5.3 mmol/L    Chloride 116 (H) 94 - 109 mmol/L    Carbon Dioxide 18 (L) 20 - 32 mmol/L    Anion Gap 8 3 - 14 mmol/L    Glucose 195 (H) 70 - 99 mg/dL    Urea Nitrogen 7 7 - 30 mg/dL    Creatinine 0.58 0.52 - 1.04 mg/dL    GFR Estimate >90 >60 mL/min/[1.73_m2]    GFR Estimate If Black >90 >60 mL/min/[1.73_m2]    Calcium 7.3 (L) 8.5 - 10.1 mg/dL   Fibrinogen activity   Result Value Ref Range    Fibrinogen 249 200 - 420 mg/dL   Glucose by meter   Result Value Ref Range    Glucose 180 (H) 70 - 99 mg/dL   CBC with platelets   Result Value Ref Range    WBC 15.0 (H) 4.0 - 11.0 10e9/L    RBC Count 2.98 (L) 3.8 - 5.2 10e12/L    Hemoglobin 9.5 (L) 11.7 - 15.7 g/dL    Hematocrit 27.0 (L) 35.0 - 47.0 %    MCV 91 78 - 100 fl    MCH 31.9 26.5 - 33.0 pg    MCHC 35.2 31.5 - 36.5 g/dL    RDW 14.2 10.0 - 15.0 %    Platelet Count 110 (L) 150 - 450 10e9/L   Glucose by meter   Result Value Ref Range    Glucose 124 (H) 70 - 99 mg/dL   Hemoglobin   Result Value Ref Range    Hemoglobin 9.2 (L) 11.7 - 15.7 g/dL

## 2019-08-24 NOTE — OP NOTE
Procedure Date: 2019      PREOPERATIVE DIAGNOSES:     1.  40+0 weeks' gestation.      2.  Failure to descend.     3.  Fetal intolerance to labor.      4.  Maternal atrial fibrillation.         POSTOPERATIVE DIAGNOSES:   1.  40+0 weeks' gestation.      2.  Failure to descend.     3.  Fetal intolerance to labor.       4.  Maternal atrial fibrillation.       5.  Occiput posterior position.        PROCEDURE:  Primary low transverse  section.      SURGEON:  Natalee Farris MD      ASSISTANT:  None.      COMPLICATIONS:  None.      QUANTITATIVE BLOOD LOSS:  810 mL.        ANESTHESIA:  Epidural.      FINDINGS:  A male infant in the occiput posterior presentation with a weight of 8 pounds 3 ounces and Apgars of 8 and 9.  Normal-appearing uterus, tubes and bilateral ovaries.      INDICATIONS:  Debbi is a 33-year-old G1, P0 who presented to Labor and Delivery in the early morning hours at 40+0 weeks gestation in active labor.  During assessment and [facility], Debbi was found to have a rapid and irregular pulse and began having alteration and rapid response was called.  EKG was run, which showed Debbi to be in atrial fibrillation and she was immediately started on diltiazem drip with the Cardiology Service.  She was then admitted to Labor and Delivery with fairly good rate control as well as stabilization of blood pressures and did well throughout the day.  Fetal monitoring was reassuring throughout the labor process.  Upon becoming complete, Debbi was allowed to labor down for 1 hour and with lack of descent, decision was made to start pushing.  Pitocin was started for augmentation.  After approximately 1 hour, Debbi was found to have made minimal progress upon my arrival for evaluation.  I pushed with Debbi for approximately 30 minutes.  Fetus was felt to be in occiput posterior position with inability to rotate with the next 3 pushes.  At this time, later appearing  decelerations were occurring with each and every contraction/push and conversation was had regarding remoteness from delivery as well as non-reassuring fetal well-being.  We discussed the recommendation for primary  section as well as risks, benefits and alternatives.  Debbi and her  agreed to procedure.  Appropriate teams were notified.  Consent was signed and Debbi was taken to the operating room.      DESCRIPTION OF PROCEDURE:  Sadaf was taken to the operating room where epidural anesthesia was redosed to be adequate for  section.  A timeout was performed to identify correct patient and procedure after she was prepped and prepared in dorsal supine position with left lateral tilt.  Pfannenstiel skin incision was made using the scalpel and this was carried down to the underlying layer of fascia.  Fascia was then incised in the midline and this incision was extended laterally using the Vinson scissor.  Superior aspect of the fascial incision was then grasped with Kocher clamps x2, elevated, and the rectus muscles dissected.  Attention was similarly turned to the inferior border.  Rectus muscles were then  in the midline and the peritoneum was identified and entered sharply.  This incision was extended both superiorly and inferiorly with good visualization of the bladder.  The bladder blade was then placed and the vesicouterine peritoneum was identified, tented and the bladder flap created.  Lower uterine segment was then incised in a transverse fashion and this incision was extended using gentle traction.  Infant head was delivered from a +1 station with moderate amount of difficulty from the occiput posterior position.  Remainder of the infant was delivered without difficulty and placed on the maternal abdomen.  Cord clamping was delayed by 1 minute, and infant was handed off to awaiting  nurse practitioners.  Placenta was delivered manually and intact.  Uterus was then  exteriorized and cleared of all clot and debris.  Uterine incision was reapproximated using a 3-0 Vicryl in a running locked fashion.  A second imbricating layer was completed using the same suture.  The uterus was then replaced into the abdominal cavity and gutters were cleared of all clot and debris.  Peritoneum was reapproximated using a 3-0 Vicryl in a running stitch.  The fascia was reapproximated using an 0 looped PDS in a running locked fashion.  Irrigation was performed prior to skin closure, which was completed using a 4-0 Monocryl suture.  Bandages were placed.        The patient was taken to the recovery room in stable condition.  Per the recommendations of Cardiology, Dimple will go to the cardiac special care unit for continuous telemetry to ensure that she does not reenter atrial fibrillation.         MINH WEST MD             D: 2019   T: 2019   MT: IMMANUEL      Name:     DIMPLE COCHRAN   MRN:      [id number]        Account:        [id number]   :      1985           Procedure Date: 2019      Document: [id number]

## 2019-08-24 NOTE — BRIEF OP NOTE
Brief Op Note:    PreOp Dx: gross hematuria  PostOp Dx: bladder injury  Procedure: cystoscopy and exploratory laparotomy followed by repeat cystoscopy with bilateral ureteral stent placement, retrograde cystogram and bladder repair by Dr. Mireles of urology  Surgeon: Natalee Farris MD  Intraoperative Consultation: Venkatesh Mireles MD  Assistant: Julisa Joel MD  Anesthesia: General  EBL: 2500mL  Fluids: 3400mL crystalloid, 4U PRBC, 1 FFP, 500mL albumin  Findings: gross hematuria and large clot at dome of bladder; bilateral ureters intact, large defect at dome of bladder; cervical laceration  Drains: mcmahon catheter, TYREL drain  Specimen: none  Procedure: see dictated note    Natalee Farris MD

## 2019-08-24 NOTE — PROVIDER NOTIFICATION
Dangled at bedside. HR 130s with hypotension, diaphoretic, lightheadedness. Pt back in bed supine position. HR 90s with BP better. Cardiology paged. Awaiting for a return call.

## 2019-08-24 NOTE — ANESTHESIA POSTPROCEDURE EVALUATION
Patient: Debbi Grossman    Procedure(s):   SECTION    Diagnosis:pregnancy-failure to descend  Diagnosis Additional Information: No value filed.    Anesthesia Type:  Epidural    Note:  Anesthesia Post Evaluation    Patient location during evaluation: PACU  Patient participation: Able to fully participate in evaluation  Level of consciousness: awake and alert  Pain management: adequate  Airway patency: patent  Cardiovascular status: acceptable and hemodynamically stable  Respiratory status: acceptable and unassisted  Hydration status: acceptable  PONV: none     Anesthetic complications: None          Last vitals:  Vitals:    19   BP: 109/65  120/52   Pulse: 96  96   Resp: 19 21 10   Temp:  36.6  C (97.9  F)    SpO2: 97% 97% 97%         Electronically Signed By: Awais Marti MD  2019  8:57 PM

## 2019-08-24 NOTE — OR NURSING
PER VERBAL ORDER FROM DR. WEST, VINCENT 20FR CATHETER WAS IRRIGATED WITH 60CC OF 0.9% NORMAL SALINE PRIOR TO TRANSFER TO PACU. NURSE WAS ABLE TO FLUSH VINCENT CATHETER WITHOUT ANY DIFFICULTY, CHERRY TINGED URINE RETURNED TO VINCENT BAG.

## 2019-08-24 NOTE — PLAN OF CARE
1925: In OB PACU. Dr Farris at bedside. Notified of bloody urine. Plan per provider is to watch urine output closely.

## 2019-08-24 NOTE — PLAN OF CARE
Dr Farris discussed  section with pt due to recurrent late decels with pushing. Pt requested she be allowed to push a little longer.

## 2019-08-24 NOTE — PROGRESS NOTES
OB Progress Note    Called back to PACU to evaluate hematuria.  Uncomplicated primary  section with me for failure to descend after 1.5hrs pushing and fetal intolerance of labor.  Bladder somewhat distended during procedure but no known or concern for bladder injury.  RN asked to replace mcmahon catheter to ensure proper placement.  Replaced without issue but continues to have hunter bleeding in mcmahon catheter.  Patient sleepy from recent dilaudid and long labor process but comfortable and oriented.  Mild abdominal pain noted.  VSS.  Afebrile.  Vaginal bleeding as expected.  Will proceed with surgical evaluation.  Will start with cystoscopy.  If concern for bladder injury, will proceed with exploratory laparotomy thru recent  incision.  Patient and family informed of recommendations.  Consent signed.  Paged placed to urology as courtesy --awaiting call back.  To [facility] OR     Natalee Farris MD

## 2019-08-24 NOTE — LACTATION NOTE
Initial visit with SALOMON Werner and baby.    Breastfeeding general information reviewed.   Advised to breastfeed exclusively, on demand, avoid pacifiers, bottles and formula unless medically indicated.  Encouraged rooming in, skin to skin, feeding on demand 8-12x/day or sooner if baby cues.  Explained benefits of holding and skin to skin.  Encouraged lots of skin to skin. Instructed on hand expression.   Continues to nurse well per mom.  Baby skin to skin and spitting up at time of LC visit.  Mother able to hand express gtts of colostrum.  If baby continues to be sleepy for feeding will have mother begin pumping.     No further questions at this time.   Outpatient resource phone numbers given. Plans to get the [employer]ra breast pump for home.    Will follow as needed.   Sandra MENEZESN, RN, PHN, RNC-MNN, IBCLC

## 2019-08-24 NOTE — PROVIDER NOTIFICATION
08/23/19 1630   Provider Notification   Provider Name/Title Dr Farris   Method of Notification At Bedside   Request Evaluate in Person   Notification Reason Decels;Status Update  (strip reviewed)

## 2019-08-24 NOTE — PLAN OF CARE
Pt transferred from ICU to room 403. VSS. Did well getting out of bed. Tolerated sitting in chair before transport. Pt accompanied by RN,  and father. Oriented to postpartum. Will con't current plan of care.

## 2019-08-24 NOTE — ANESTHESIA CARE TRANSFER NOTE
Patient: Debbi Grossman    Procedure(s):   SECTION    Diagnosis: pregnancy-failure to descend  Diagnosis Additional Information: No value filed.    Anesthesia Type:   Epidural     Note:  Airway :Room Air  Patient transferred to:PACU  Comments: Transferred to PACU, spontaneous RR, on room air.  Monitors and alarms on and functioning, VSS, patient awake and comfortable.  Report to PACU RN.Handoff Report: Identifed the Patient, Identified the Reponsible Provider, Reviewed the pertinent medical history, Discussed the surgical course, Reviewed Intra-OP anesthesia mangement and issues during anesthesia, Set expectations for post-procedure period and Allowed opportunity for questions and acknowledgement of understanding      Vitals: (Last set prior to Anesthesia Care Transfer)    CRNA VITALS  2019 1848 - 2019 1924      2019             Resp Rate (set):  10                Electronically Signed By: ASHLEY Zamudio CRNA  2019  7:24 PM

## 2019-08-24 NOTE — ANESTHESIA CARE TRANSFER NOTE
Patient: Debbi Grossman    Procedure(s):  CYSTOSCOPY  REPAIR, BLADDER  Cystoscopy, retrogrades, combined    Diagnosis: Blood In Urine  Diagnosis Additional Information: No value filed.    Anesthesia Type:   General, RSI, ETT     Note:  Airway :Face Mask  Patient transferred to:PACU  Comments: Transferred to PACU, spontaneous respirations, 10L oxygen via facemask.  All monitors and alarms on and functioning, VSS.  Patient awake, comfortable.  Report to PACU RN.Handoff Report: Identifed the Patient, Identified the Reponsible Provider, Reviewed the pertinent medical history, Discussed the surgical course, Reviewed Intra-OP anesthesia mangement and issues during anesthesia, Set expectations for post-procedure period and Allowed opportunity for questions and acknowledgement of understanding      Vitals: (Last set prior to Anesthesia Care Transfer)    CRNA VITALS  8/24/2019 0213 - 8/24/2019 0257      8/24/2019             EKG:  Sinus rhythm                Electronically Signed By: ASHLEY Zamudio CRNA  August 24, 2019  2:57 AM

## 2019-08-24 NOTE — ANESTHESIA PREPROCEDURE EVALUATION
Anesthesia Pre-Procedure Evaluation    Patient: Debbi Grossman   MRN: [id number] : 1985          Preoperative Diagnosis: Blood In Urine    Procedure(s):  CYSTOSCOPY  REPAIR, BLADDER    Past Medical History:   Diagnosis Date     CP (cerebral palsy) (H)      Seasonal allergies      Past Surgical History:   Procedure Laterality Date     HC REMOVE TONSILS/ADENOIDS,<13 Y/O       HC TOOTH EXTRACTION W/FORCEP      [place] Teeth       Anesthesia Evaluation     .             ROS/MED HX    ENT/Pulmonary:  - neg pulmonary ROS   (+)allergic rhinitis, , . .    Neurologic:     (+)other neuro CP hx    Cardiovascular: Comment: On admission new onset A Fib with RVR, cardiology consulted, Echo showed normal structure and function of the heart, patient was started on diltiazem gtt for rate control, she spontaneously convert back to NSR, diltiazem gtt stopped by cardiology.    (+) ----. : . . . :. dysrhythmias a-fib, .       METS/Exercise Tolerance:     Hematologic:         Musculoskeletal:         GI/Hepatic:         Renal/Genitourinary:         Endo:         Psychiatric:         Infectious Disease:         Malignancy:         Other:                          Physical Exam  Normal systems: pulmonary and dental    Airway   Mallampati: II  TM distance: >3 FB  Neck ROM: full    Dental     Cardiovascular   Rhythm and rate: regular and normal      Pulmonary             Lab Results   Component Value Date    WBC 13.8 (H) 2019    HGB 14.2 2019    HCT 39.4 2019     2019    SED 6 2012     2019    POTASSIUM 4.1 2019    CHLORIDE 108 2019    CO2 20 2019    BUN 9 2019    CR 0.54 2019     (H) 2019    MITZI 8.5 2019    PHOS 2.8 2019    MAG 2.3 2019    ALBUMIN 2.8 (L) 2019    PROTTOTAL 6.2 (L) 2019    ALT 18 2019    AST 19 2019    ALKPHOS 134 2019    BILITOTAL 0.4 2019    TSH 4.52 (H) 2019    T4  "1.12 08/23/2019       Preop Vitals  BP Readings from Last 3 Encounters:   08/23/19 120/52   08/20/19 114/70   08/13/19 112/60    Pulse Readings from Last 3 Encounters:   08/23/19 96   01/29/19 68   03/29/18 86      Resp Readings from Last 3 Encounters:   08/23/19 10   06/16/14 14   08/02/12 16    SpO2 Readings from Last 3 Encounters:   08/23/19 97%   03/29/18 98%   04/26/17 97%      Temp Readings from Last 1 Encounters:   08/23/19 36.6  C (97.9  F) (Oral)    Ht Readings from Last 1 Encounters:   08/23/19 1.765 m (5' 9.5\")      Wt Readings from Last 1 Encounters:   08/23/19 72.6 kg (160 lb)    Estimated body mass index is 23.29 kg/m  as calculated from the following:    Height as of this encounter: 1.765 m (5' 9.5\").    Weight as of this encounter: 72.6 kg (160 lb).     Allergies   Allergen Reactions     Seasonal Allergies      Social History     Tobacco Use     Smoking status: Never Smoker     Smokeless tobacco: Never Used   Substance Use Topics     Alcohol use: No     Frequency: Never     Comment: Less than once a month     Prior to Admission medications    Medication Sig Start Date End Date Taking? Authorizing Provider   adapalene (DIFFERIN) 0.1 % gel Apply  topically At Bedtime.Need annual office visit 3/29/18  Yes Marixa Roche PA-C   benzoyl peroxide-erythromycin (BENZAMYCIN) topical gel Apply twice daily 3/29/18  Yes Marixa Roche PA-C   Docosahexaenoic Acid (DHA PO)    Yes Reported, Patient   mometasone (NASONEX) 50 MCG/ACT spray Spray 2 sprays into both nostrils daily 3/29/18  Yes Marixa Roche PA-C   Prenatal Multivit-Min-Fe-FA (PRENATAL VITAMINS PO) Take 1 tablet by mouth   Yes Reported, Patient     Current Facility-Administered Medications Ordered in Epic   Medication Dose Route Frequency Last Rate Last Dose     [START ON 8/26/2019] acetaminophen (TYLENOL) tablet 650 mg  650 mg Oral Q4H PRN         acetaminophen (TYLENOL) tablet 975 mg  975 mg Oral Q8H         [START ON 8/25/2019] " bisacodyl (DULCOLAX) Suppository 10 mg  10 mg Rectal Daily PRN         dextrose 5% in lactated ringers infusion   Intravenous Continuous         hydrocortisone 2.5 % cream   Rectal TID PRN         HYDROmorphone (PF) (DILAUDID) injection 0.3-0.5 mg  0.3-0.5 mg Intravenous Q30 Min PRN   0.5 mg at 08/23/19 2031     ibuprofen (ADVIL/MOTRIN) tablet 800 mg  800 mg Oral Q6H PRN         ketorolac (TORADOL) injection 30 mg  30 mg Intravenous Q6H   30 mg at 08/23/19 1947     lactated ringers BOLUS 1,000 mL  1,000 mL Intravenous Once PRN         lanolin ointment   Topical Q1H PRN         lidocaine (LMX4) cream   Topical Q1H PRN         lidocaine 1 % 0.1-1 mL  0.1-1 mL Other Q1H PRN         naloxone (NARCAN) injection 0.1-0.4 mg  0.1-0.4 mg Intravenous Q2 Min PRN         No MMR Needed -  Assessment: Patient does not need MMR vaccine   Does not apply Continuous PRN         NO Rho (D) immune globulin (RhoGam) needed - mother Rh POSITIVE   Does not apply Continuous PRN         No Tdap Needed - Assessment: Patient does not need Tdap vaccine   Does not apply Continuous PRN         ondansetron (ZOFRAN) injection 4 mg  4 mg Intravenous Q6H PRN         oxyCODONE (ROXICODONE) tablet 5-10 mg  5-10 mg Oral Q3H PRN         oxytocin (PITOCIN) 30 units in 500 mL 0.9% NaCl infusion  100 mL/hr Intravenous Continuous         oxytocin (PITOCIN) 30 units in 500 mL 0.9% NaCl infusion  340 mL/hr Intravenous Continuous PRN         oxytocin (PITOCIN) injection 10 Units  10 Units Intramuscular Once PRN         senna-docusate (SENOKOT-S/PERICOLACE) 8.6-50 MG per tablet 1 tablet  1 tablet Oral BID PRN        Or     senna-docusate (SENOKOT-S/PERICOLACE) 8.6-50 MG per tablet 2 tablet  2 tablet Oral BID PRN         simethicone (MYLICON) chewable tablet 80 mg  80 mg Oral 4x Daily PRN         sodium chloride (PF) 0.9% PF flush 3 mL  3 mL Intracatheter q1 min prn         sodium chloride (PF) 0.9% PF flush 3 mL  3 mL Intracatheter Q8H         [START ON  8/25/2019] sodium phosphate (FLEET ENEMA) 1 enema  1 enema Rectal Daily PRN         tranexamic acid (CYKLOKAPRON) Bolus 1g vial attach to NaCl 50 or 100 mL bag ADULT  1 g Intravenous Q30 Min PRN         No current [facility]-ordered outpatient medications on file.       dextrose 5% lactated ringers       - MEDICATION INSTRUCTIONS -       NO Rho (D) immune globulin (RhoGam) needed - mother Rh POSITIVE       - MEDICATION INSTRUCTIONS -       oxytocin in 0.9% NaCl       oxytocin in 0.9% NaCl       Recent Labs   Lab Test 08/23/19  1443 08/23/19  0352 03/29/18  0824  08/21/12  1720   NA  --  139  --   --  140   POTASSIUM 4.1 3.3*  --   --  3.8   CHLORIDE  --  108  --   --  103   CO2  --  20  --   --  23   ANIONGAP  --  11  --   --  13   GLC  --  112* 89   < > 102*   BUN  --  9  --   --  10   CR  --  0.54  --   --  0.65   MITZI  --  8.5  --   --  9.1    < > = values in this interval not displayed.     Recent Labs   Lab Test 08/23/19  0352 05/28/19  0826   WBC 13.8* 8.8   HGB 14.2 11.3*    245     Recent Labs   Lab Test 08/23/19  0352   ABO A   RH Pos     No results for input(s): TROPI in the last 99209 hours.  No results for input(s): PH, PCO2, PO2, HCO3 in the last 52704 hours.  No results for input(s): HCG in the last 51088 hours.  No results found for this or any previous visit (from the past 744 hour(s)).    RECENT LABS:       Anesthesia Plan      History & Physical Review  History and physical reviewed and following examination; no interval change.    ASA Status:  2 emergent.    NPO Status:  Waived due to emergency    Plan for General, RSI and ETT with Intravenous and Propofol induction. Maintenance will be Balanced.    PONV prophylaxis:  Ondansetron (or other 5HT-3) and Dexamethasone or Solumedrol       Postoperative Care  Postoperative pain management:  IV analgesics.      Consents  Anesthetic plan, risks, benefits and alternatives discussed with:  Patient..                 Awais Marti MD

## 2019-08-24 NOTE — PROGRESS NOTES
Urology Daily Progress Note  08/24/2019    24 hour events/Subjective:     -  Transferred to ICU yesterday due to low BP and tachycardia, improved with minimal interventions    - catheter with red output     O:  Temp:  [96.5  F (35.8  C)-98.1  F (36.7  C)] 97.9  F (36.6  C)  Pulse:  [] 89  Heart Rate:  [] 91  Resp:  [8-21] 10  BP: ()/(52-79) 103/68  MAP:  [44 mmHg-114 mmHg] 58 mmHg  Arterial Line BP: ()/(38-75) 84/45  SpO2:  [92 %-100 %] 97 %  General: Alert, interactive, & in NAD  Resp: Breathing is non-labored on RA  : catheter with red output. TYREL with SSD   Extremities: No LE edema or obvious joint abnormalities  Skin: Warm and dry, no jaundice or rash     Ins & Outs (24 hours/since MN)  UOP  1250/170  Drain  120/NR    Labs/Imaging  BMP  Recent Labs   Lab 08/24/19 0310 08/24/19 0216 08/24/19 0209 08/24/19  0134 08/23/19  2145  08/23/19  1241 08/23/19  0352    137 136 137 139  --   --  139   POTASSIUM 4.9 5.2 5.2 4.7 4.3   < >  --  3.3*   CHLORIDE 116*  --   --   --  110*  --   --  108   MITZI 7.3*  --   --   --  7.8*  --   --  8.5   CO2 18*  --   --   --  23  --   --  20   BUN 7  --   --   --  10  --   --  9   CR 0.58  --   --   --  0.57  --   --  0.54   MAG  --   --   --   --   --   --  2.3 1.6   PHOS  --   --   --   --   --   --   --  2.8   *  --   --   --  120*  --   --  112*    < > = values in this interval not displayed.     CBC  Recent Labs   Lab 08/24/19 0310 08/24/19 0216 08/24/19  0209 08/24/19  0134 08/23/19  2359 08/23/19  2145 08/23/19  0352   WBC 17.0*  --   --   --  22.4* 21.4* 13.8*   HGB 11.6* 9.5* 9.5* 7.8* 9.6* 10.0* 14.2   HCT 33.8*  --   --   --  28.0* 29.8* 39.4   MCV 93  --   --   --  94 94 91   *  --   --   --  196 177 223     INR  Recent Labs   Lab 08/24/19  0310 08/24/19  0023 08/23/19  2330   INR 1.23* 1.27* Canceled, Test credited        Assessment/Plan  33 year old female POD #1 s/p bladder repair and bilateral stent placement for injury  during  . Doing well.    PLAN:  - continue catheter for 2 weeks, will need cystogram prior to catheter removal   - continue to monitor output, page urology with significant change in color of urine       Will discuss with Dr. Mireles.    --    Dodie Gomez MD   Urology Resident    9:49 AM, 2019

## 2019-08-24 NOTE — PROVIDER NOTIFICATION
Continue to be soft BPs. Hospitalist and cardiology notified and no interventions for now. Cardiology ok to transfer pt to post-partum floor with daily EKG monitoring.

## 2019-08-24 NOTE — PROGRESS NOTES
[facility]    Medicine Progress Note - Hospitalist Service       Date of Admission:  2019  Assessment & Plan   Debbi Grossman is a 33 year old female admitted on 2019. She has no significant past medical history and  40 weeks pregnant who presented to labor and delivery with increased lethargy, confusion, active labor, and atrial fibrillation with rapid ventricular response.      #. Atrial fibrillation with rapid ventricular response during pregnancy: RRT AM 2019 for borderline hypotension with 12 lead EKG demonstrating atrial fibrillation with rapid ventricular response. It's not clear for how long the patient has been in atrial fibrillation; therefore, initial goal wsa to opt for rate control over rhythm control.  TTE with EF 55-60%.  Px has recently spontaneously converted to NSR prior to birth on 2019.  Px is was otherwise asymptomatic.  Elevated TSH however T4 is wnl.  UA negative.  diltiazem drip was then discharged after px returned to NSR.   - Cardiology follow up pending, ie 1. Start on rate limiting drug and 2. Consideration of anticoagulation going forward now px is in NSR.  Clearly not a candidate currently given bleeding risk.   - Follow up cardiology recommendations   - Electrolyte replacement in progress  - Currently however, px is back in NSR and is otherwise stable from this perspective and doesn't necessarily require transfer to [facility] for this.      #. Acute encephalopathy: Resolved.   -will continue monitoring mentation      #. Post partum now s/p delivery  is otherwise managed by the primary service OB/GYN.       Diet: Room Service  Advance Diet as Tolerated: Clear Liquid Diet    DVT Prophylaxis: Defer to primary service  Medina Catheter: in place, indication: Epidural/Intrathecal Catheter, /GI/GYN Pelvic Procedure  Code Status: Full Code      Disposition Plan   Expected discharge: TBD, recommended to prior living environment once per primary  service.  Entered: Alex Woods MD 08/24/2019, 11:59 AM       The patient's care was discussed with the Patient, Patient's Family and ICU Consultant.    Alex Woods MD  Hospitalist Service  [facility]    ______________________________________________________________________    Interval History       Data reviewed today: I reviewed all medications, new labs and imaging results over the last 24 hours. I personally reviewed no images or EKG's today.    Physical Exam   Vital Signs: Temp: 97.9  F (36.6  C) Temp src: Axillary BP: 92/64 Pulse: 104 Heart Rate: 112 Resp: 24 SpO2: 99 % O2 Device: None (Room air) Oxygen Delivery: 2 LPM  Weight: 160 lbs 0 oz  Gen: NAD, tired appearing    HEENT: MMM, EOMI   Neck: supple, no posterior/anterior cervical LAD  Lungs: CTAB, no W/R/R, no accessory muscle use   CV: regular rhythm w rate in the 100s, no M/G/R, no JVD   Abd: Post partum  Ext: no clubbing/cyanosis/edema  MSK: no joint effusions or tenderness  Skin: no obvious bruising or open wounds   Neuro: AOx3, cranial nerves II-XII in tact, slow to answer questions but following commands     Data   Recent Labs   Lab 08/24/19  1026 08/24/19  0310 08/24/19  0216 08/24/19  0209  08/24/19  0023 08/23/19  2359 08/23/19  2330 08/23/19  2145  08/23/19  0352   WBC 15.0* 17.0*  --   --   --   --  22.4*  --  21.4*  --  13.8*   HGB 9.5* 11.6* 9.5* 9.5*   < >  --  9.6*  --  10.0*  --  14.2   MCV 91 93  --   --   --   --  94  --  94  --  91   * 105*  --   --   --   --  196  --  177  --  223   INR  --  1.23*  --   --   --  1.27*  --  Canceled, Test credited  --   --   --    NA  --  142 137 136   < >  --   --   --  139  --  139   POTASSIUM  --  4.9 5.2 5.2   < >  --   --   --  4.3   < > 3.3*   CHLORIDE  --  116*  --   --   --   --   --   --  110*  --  108   CO2  --  18*  --   --   --   --   --   --  23  --  20   BUN  --  7  --   --   --   --   --   --  10  --  9   CR  --  0.58  --   --   --   --   --   --  0.57   --  0.54   ANIONGAP  --  8  --   --   --   --   --   --  6  --  11   MITZI  --  7.3*  --   --   --   --   --   --  7.8*  --  8.5   GLC  --  195*  --   --   --   --   --   --  120*  --  112*   ALBUMIN  --   --   --   --   --   --   --   --   --   --  2.8*   PROTTOTAL  --   --   --   --   --   --   --   --   --   --  6.2*   BILITOTAL  --   --   --   --   --   --   --   --   --   --  0.4   ALKPHOS  --   --   --   --   --   --   --   --   --   --  134   ALT  --   --   --   --   --   --   --   --   --   --  18   AST  --   --   --   --   --   --   --   --   --   --  19    < > = values in this interval not displayed.

## 2019-08-25 LAB
ALBUMIN SERPL-MCNC: 2.1 G/DL (ref 3.4–5)
ALP SERPL-CCNC: 59 U/L (ref 40–150)
ALT SERPL W P-5'-P-CCNC: 18 U/L (ref 0–50)
ANION GAP SERPL CALCULATED.3IONS-SCNC: 3 MMOL/L (ref 3–14)
AST SERPL W P-5'-P-CCNC: 35 U/L (ref 0–45)
BILIRUB SERPL-MCNC: 0.4 MG/DL (ref 0.2–1.3)
BLD PROD TYP BPU: NORMAL
BLD UNIT ID BPU: 0
BLOOD PRODUCT CODE: NORMAL
BPU ID: NORMAL
BUN SERPL-MCNC: 7 MG/DL (ref 7–30)
CALCIUM SERPL-MCNC: 7.8 MG/DL (ref 8.5–10.1)
CHLORIDE SERPL-SCNC: 115 MMOL/L (ref 94–109)
CO2 SERPL-SCNC: 25 MMOL/L (ref 20–32)
CREAT SERPL-MCNC: 0.51 MG/DL (ref 0.52–1.04)
ERYTHROCYTE [DISTWIDTH] IN BLOOD BY AUTOMATED COUNT: 14.8 % (ref 10–15)
GFR SERPL CREATININE-BSD FRML MDRD: >90 ML/MIN/{1.73_M2}
GLUCOSE SERPL-MCNC: 85 MG/DL (ref 70–99)
HCT VFR BLD AUTO: 23.2 % (ref 35–47)
HGB BLD-MCNC: 8.1 G/DL (ref 11.7–15.7)
MCH RBC QN AUTO: 31.8 PG (ref 26.5–33)
MCHC RBC AUTO-ENTMCNC: 34.9 G/DL (ref 31.5–36.5)
MCV RBC AUTO: 91 FL (ref 78–100)
PLATELET # BLD AUTO: 131 10E9/L (ref 150–450)
POTASSIUM SERPL-SCNC: 4.1 MMOL/L (ref 3.4–5.3)
PROT SERPL-MCNC: 4.1 G/DL (ref 6.8–8.8)
RBC # BLD AUTO: 2.55 10E12/L (ref 3.8–5.2)
SODIUM SERPL-SCNC: 143 MMOL/L (ref 133–144)
TRANSFUSION STATUS PATIENT QL: NORMAL
WBC # BLD AUTO: 14.4 10E9/L (ref 4–11)

## 2019-08-25 PROCEDURE — 99232 SBSQ HOSP IP/OBS MODERATE 35: CPT | Mod: 24 | Performed by: INTERNAL MEDICINE

## 2019-08-25 PROCEDURE — 85027 COMPLETE CBC AUTOMATED: CPT | Performed by: OBSTETRICS & GYNECOLOGY

## 2019-08-25 PROCEDURE — 12000035 ZZH R&B POSTPARTUM

## 2019-08-25 PROCEDURE — 25000128 H RX IP 250 OP 636: Performed by: OBSTETRICS & GYNECOLOGY

## 2019-08-25 PROCEDURE — 93005 ELECTROCARDIOGRAM TRACING: CPT

## 2019-08-25 PROCEDURE — 36415 COLL VENOUS BLD VENIPUNCTURE: CPT | Performed by: OBSTETRICS & GYNECOLOGY

## 2019-08-25 PROCEDURE — 25800030 ZZH RX IP 258 OP 636: Performed by: OBSTETRICS & GYNECOLOGY

## 2019-08-25 PROCEDURE — 80053 COMPREHEN METABOLIC PANEL: CPT | Performed by: OBSTETRICS & GYNECOLOGY

## 2019-08-25 PROCEDURE — 25000132 ZZH RX MED GY IP 250 OP 250 PS 637: Performed by: OBSTETRICS & GYNECOLOGY

## 2019-08-25 PROCEDURE — 93010 ELECTROCARDIOGRAM REPORT: CPT | Performed by: INTERNAL MEDICINE

## 2019-08-25 PROCEDURE — P9041 ALBUMIN (HUMAN),5%, 50ML: HCPCS | Performed by: OBSTETRICS & GYNECOLOGY

## 2019-08-25 PROCEDURE — 99232 SBSQ HOSP IP/OBS MODERATE 35: CPT | Performed by: INTERNAL MEDICINE

## 2019-08-25 RX ORDER — DIPHENHYDRAMINE HYDROCHLORIDE 50 MG/ML
50 INJECTION INTRAMUSCULAR; INTRAVENOUS
Status: DISCONTINUED | OUTPATIENT
Start: 2019-08-25 | End: 2019-08-28 | Stop reason: HOSPADM

## 2019-08-25 RX ORDER — METHYLPREDNISOLONE SODIUM SUCCINATE 125 MG/2ML
125 INJECTION, POWDER, LYOPHILIZED, FOR SOLUTION INTRAMUSCULAR; INTRAVENOUS
Status: DISCONTINUED | OUTPATIENT
Start: 2019-08-25 | End: 2019-08-28 | Stop reason: HOSPADM

## 2019-08-25 RX ADMIN — ALBUMIN HUMAN 500 ML: 0.05 INJECTION, SOLUTION INTRAVENOUS at 00:19

## 2019-08-25 RX ADMIN — IBUPROFEN 800 MG: 400 TABLET ORAL at 09:49

## 2019-08-25 RX ADMIN — OXYCODONE HYDROCHLORIDE 5 MG: 5 TABLET ORAL at 22:58

## 2019-08-25 RX ADMIN — IRON SUCROSE 200 MG: 20 INJECTION, SOLUTION INTRAVENOUS at 13:14

## 2019-08-25 RX ADMIN — SENNOSIDES AND DOCUSATE SODIUM 2 TABLET: 8.6; 5 TABLET ORAL at 08:06

## 2019-08-25 RX ADMIN — IBUPROFEN 800 MG: 400 TABLET ORAL at 04:22

## 2019-08-25 RX ADMIN — ACETAMINOPHEN 975 MG: 325 TABLET ORAL at 04:22

## 2019-08-25 RX ADMIN — ACETAMINOPHEN 975 MG: 325 TABLET ORAL at 20:00

## 2019-08-25 RX ADMIN — IBUPROFEN 800 MG: 400 TABLET ORAL at 21:14

## 2019-08-25 RX ADMIN — OXYCODONE HYDROCHLORIDE 5 MG: 5 TABLET ORAL at 12:51

## 2019-08-25 RX ADMIN — SENNOSIDES AND DOCUSATE SODIUM 2 TABLET: 8.6; 5 TABLET ORAL at 20:00

## 2019-08-25 RX ADMIN — ACETAMINOPHEN 975 MG: 325 TABLET ORAL at 11:58

## 2019-08-25 RX ADMIN — IBUPROFEN 800 MG: 400 TABLET ORAL at 15:39

## 2019-08-25 NOTE — PLAN OF CARE
"Soft BPs and occasionally tachy, other VS remain WNL. Medina positional at times; adequate output now straw colored w/ small blood clots. Scant vag bleeding. Incision dressing reinforced at 2300 and new pressure dressing placed at 0500. Moderate amount of serous fluid draining from TYREL site. No change in TYREL drain amount overnight. Pain controlled w/ tylenol and ibuprofen, oxy x1. Breastfeeding well w/ latch assistance when physically able. Had syncopal episode after \"feeling hot\" on toilet at 2200. Juanita Steady and staff assistance to get back to bed. See flow sheet for vitals.  during episode. Lethargic for 5 hours post episode. Able to dangle at bedside at 0500 with no dizziness. Feeling much better and remains in great spirits this morning.   "

## 2019-08-25 NOTE — LACTATION NOTE
Routine visit with Debbi, Her mother, Braeden and baby boy.   Mother states he has been breast feeding well.    Breastfeeding general information reviewed.   Advised to breastfeed exclusively, on demand, avoid pacifiers, bottles and formula unless medically indicated.  Encouraged rooming in, skin to skin, feeding on demand 8-12x/day or sooner if baby cues.  Explained benefits of holding and skin to skin.  Encouraged lots of skin to skin. Instructed on hand expression.   Continues to nurse well per mom. Plans to get Spectra pump.  Outpatient resource phone numbers given. No further questions at this time.   Will follow as needed.   Sandra Paulson BSN, RN, PHN, RNC-MNN, IBCLC

## 2019-08-25 NOTE — PLAN OF CARE
"From [id number] pt c/o increasing lower abdominal pain.  Baby had  recently, contributed pain to cramping.  Around 1300 FOB called RN into room and stated that mcmahon was leaking on the floor and pt felt a \"gush\".  Catheter clamp had come undone, but urine was flowing quickly into mcmahon bag.  RN had emptied 1000 mL urine at 0950, and bag had another 1000 mL at start of \"gush\".  Over the course of a few minutes, bag filled to 1400mL.  Urine was clear colored at first, but turned pink towards the end.  Pt stood at edge of bed and catheter was adjust to make sure all urine had emptied into bag.  Pt states relief from abdominal pain.  Encouraged pt to call if feeling cramping or lower abdominal pain again.  Will closely monitor output and position of catheter.    "

## 2019-08-25 NOTE — PROVIDER NOTIFICATION
08/25/19 1700   Provider Notification   Provider Name/Title Dr. Joel   Method of Notification Phone;Electronic Page   Request Evaluate-Remote   Notification Reason Status Update   No call back from urology MD yet, so paged OB. Notified MD of change in urine color from clear during middle day, to maroon color since 1300. Urine output amount is still adequate. MD thinks it is most likely from old blood/clots in bladder.  Will keep HGB scheduled for AM and continue to closely monitor color and amount of output.  Pt currently sleeping, but urine in mcmahon tubing is now looking more clear.

## 2019-08-25 NOTE — PROVIDER NOTIFICATION
08/25/19 1815   Provider Notification   Provider Name/Title Dr. Patel (urology)   Method of Notification Phone   Request Evaluate-Remote   Notification Reason Status Update   Notified Dr. Gomez (urology resident) of darkening urine color since 1300. States most likely from residual blood clots/normal healing in bladder and that pain could be from bladder spasms from trauma/surgery. See new order for medication for bladder spasms. MD requests RN to check compatibility with lactation before giving.

## 2019-08-25 NOTE — PROVIDER NOTIFICATION
08/24/19 2113   Provider Notification   Provider Name/Title Dr. Nielsen   Method of Notification Phone   Request Evaluate-Remote   Notification Reason Lab Results;Other  (question about dressing/TYREL)       Notified  about hgb result of 8.9, no new orders. Asked about dressing removal/TYREL drain dressing, okay'd to leave on until rounder sees (post 24 hours).

## 2019-08-25 NOTE — PROGRESS NOTES
[facility]    Cardiology Progress Note     Assessment & Plan   Debbi Grossman is a 33 year old female who was admitted on 8/23/2019.     Assessment:  The patient had prepartum atrial fibrillation.  She was on diltiazem and on amiodarone.  She converted to sinus rhythm during labor.  She had an extensive surgery for bladder repair and cervical laceration.    Recently she has struggled with lower blood pressures and sinus tachycardia.  She also has presyncopal episodes.    Plan:  Patient remains in sinus rhythm.  Given that the patient is known telemetry I performed an additional EKG this morning.  Again she is in sinus tachycardia.    Echocardiogram notes a normal LVEF with no structural abnormalities.    I do not think that her current mild hypotension, presyncope and sinus tachycardia are related to a ongoing cardiac process.  This is likely reflective of her blood loss, volume depletion and vaso-plegia from the surgery and all the recent stress the patient is undergone.    I do not have any additional recommendations regarding cardiovascular standpoint.  However it would be reasonable to evaluate for ongoing blood loss, and/or infection if the patient continues to have presyncopal symptoms.    We will follow the patient again tomorrow (we can repeat an EKG again tomorrow).  Recommend a ZIO Patch prior to discharge.  We can help arrange this prior to discharge.      Alejandro Patino MD    Interval History   Patient maintain sinus rhythm.  She has been mildly presyncopal and orthostatic.    Physical Exam   Temp: 98.2  F (36.8  C) Temp src: Oral BP: 108/72 Pulse: 103 Heart Rate: 103 Resp: 16 SpO2: 99 % O2 Device: None (Room air)    Vitals:    08/23/19 0200   Weight: 72.6 kg (160 lb)     Vital Signs with Ranges  Temp:  [97.2  F (36.2  C)-98.6  F (37  C)] 98.2  F (36.8  C)  Pulse:  [] 103  Heart Rate:  [] 103  Resp:  [8-17] 16  BP: ()/(54-72) 108/72  SpO2:  [91 %-100 %] 99 %  I/O  last 3 completed shifts:  In: 2003.75 [P.O.:480; I.V.:1523.75]  Out: 5178 [Urine:5170; Drains:8]    GENERAL APPEARANCE: Alert and oriented x3. No acute distress.  SKIN: Inspection of the skin reveals no rashes, ulcerations, warm, dry  NECK: Supple and symmetric.   Normal JVP  LUNGS: Clear breath sounds throughout bilaterally, no wheezes, no rhonchi  CARDIOVASCULAR: S1, S2, regular rate and rhythm without any murmurs, gallops, rubs. The carotid pulses were normal and 2+ bilaterally without bruits. Peripheral pulses were 2+ and symmetric.  ABDOMEN: Soft, non-tender, non-distended with normal bowel sounds.  No ascites noted.  EXTREMITIES: No edema.  NEUROLOGIC:  Normal mood and affect.  Sensation to touch was normal.    Medications     - MEDICATION INSTRUCTIONS -       NO Rho (D) immune globulin (RhoGam) needed - mother Rh POSITIVE       - MEDICATION INSTRUCTIONS -       oxytocin in 0.9% NaCl Stopped (08/23/19 2332)     oxytocin in 0.9% NaCl         acetaminophen  975 mg Oral Q8H     iron sucrose (VENOFER) intermittent infusion (200 mg)  200 mg Intravenous Q24H     sodium chloride (PF)  3 mL Intracatheter Q8H       Data   Results for orders placed or performed during the hospital encounter of 08/23/19 (from the past 24 hour(s))   Hemoglobin   Result Value Ref Range    Hemoglobin 9.2 (L) 11.7 - 15.7 g/dL   Hemoglobin   Result Value Ref Range    Hemoglobin 8.9 (L) 11.7 - 15.7 g/dL   Glucose by meter   Result Value Ref Range    Glucose 115 (H) 70 - 99 mg/dL   CBC with platelets   Result Value Ref Range    WBC 14.4 (H) 4.0 - 11.0 10e9/L    RBC Count 2.55 (L) 3.8 - 5.2 10e12/L    Hemoglobin 8.1 (L) 11.7 - 15.7 g/dL    Hematocrit 23.2 (L) 35.0 - 47.0 %    MCV 91 78 - 100 fl    MCH 31.8 26.5 - 33.0 pg    MCHC 34.9 31.5 - 36.5 g/dL    RDW 14.8 10.0 - 15.0 %    Platelet Count 131 (L) 150 - 450 10e9/L   Comprehensive metabolic panel   Result Value Ref Range    Sodium 143 133 - 144 mmol/L    Potassium 4.1 3.4 - 5.3 mmol/L     Chloride 115 (H) 94 - 109 mmol/L    Carbon Dioxide 25 20 - 32 mmol/L    Anion Gap 3 3 - 14 mmol/L    Glucose 85 70 - 99 mg/dL    Urea Nitrogen 7 7 - 30 mg/dL    Creatinine 0.51 (L) 0.52 - 1.04 mg/dL    GFR Estimate >90 >60 mL/min/[1.73_m2]    GFR Estimate If Black >90 >60 mL/min/[1.73_m2]    Calcium 7.8 (L) 8.5 - 10.1 mg/dL    Bilirubin Total 0.4 0.2 - 1.3 mg/dL    Albumin 2.1 (L) 3.4 - 5.0 g/dL    Protein Total 4.1 (L) 6.8 - 8.8 g/dL    Alkaline Phosphatase 59 40 - 150 U/L    ALT 18 0 - 50 U/L    AST 35 0 - 45 U/L   EKG 12-lead, tracing only   Result Value Ref Range    Interpretation ECG Click View Image link to view waveform and result

## 2019-08-25 NOTE — PROGRESS NOTES
[facility] Obstetrics Post-Op / Progress Note         Assessment and Plan:    Assessment:   Post-operative day #2  Low transverse primary  section  L&D complications: Bladder injury with take back to the OR and repair  Atrial Fibrillation on admission  Acute blood loss anemia and hypotension      Post-op: Hgb is likely still equilibrating as there is no concern for intra-abdominal bleeding due to low TYREL output and good diuresis. She has received 4 units of pRBCs and 1 unit of FFP  Improved leukocytosis.    Fainting overnight was likely vasovagal and responded to IV albumin overnight. Will closely monitor, for now and consider an additional unit of blood if symptomatic.    EKG final read is pending. No Atrial fibrillation. Sinus tachycardia noted  Urine is clearer than previously       Plan:   Will remove the TYREL drain as advised by Urology.  Continue to trend CBCs daily  Continue Medina  Appreciate care from consultants.           Interval History:   Since fainting last night she has recovered and feels better. She has had more to eat this AM without emesis but endorses some nausea. No flatus. Pain is well controlled. Feels better than previously but still has weakness compared to her baseline. Her  is at bedside and supportive.          Significant Problems:    as noted above          Review of Systems:    The Review of Systems is negative other than noted in the HPI          Medications:     Current Facility-Administered Medications   Medication     [START ON 2019] acetaminophen (TYLENOL) tablet 650 mg     acetaminophen (TYLENOL) tablet 975 mg     bisacodyl (DULCOLAX) Suppository 10 mg     dextrose 5% infusion     diphenhydrAMINE (BENADRYL) capsule 25 mg    Or     diphenhydrAMINE (BENADRYL) injection 25 mg     hydrocortisone 2.5 % cream     HYDROmorphone (DILAUDID) PCA 1 mg/mL OPIOID NAIVE     HYDROmorphone (PF) (DILAUDID) injection 0.2 mg     ibuprofen (ADVIL/MOTRIN) tablet 800 mg      lactated ringers BOLUS 1,000 mL     lanolin ointment     lidocaine (LMX4) cream     lidocaine 1 % 0.1-1 mL     magnesium sulfate 4 g in 100 mL sterile water (premade)     naloxone (NARCAN) injection 0.1-0.4 mg     No MMR Needed -  Assessment: Patient does not need MMR vaccine     NO Rho (D) immune globulin (RhoGam) needed - mother Rh POSITIVE     No Tdap Needed - Assessment: Patient does not need Tdap vaccine     ondansetron (ZOFRAN-ODT) ODT tab 4 mg    Or     ondansetron (ZOFRAN) injection 4 mg     oxyCODONE (ROXICODONE) tablet 5-10 mg     oxytocin (PITOCIN) 30 units in 500 mL 0.9% NaCl infusion     oxytocin (PITOCIN) 30 units in 500 mL 0.9% NaCl infusion     oxytocin (PITOCIN) injection 10 Units     potassium chloride (KLOR-CON) Packet 20-40 mEq     potassium chloride 10 mEq in 100 mL intermittent infusion with 10 mg lidocaine     potassium chloride 10 mEq in 100 mL sterile water intermittent infusion (premix)     potassium chloride 20 mEq in 50 mL intermittent infusion     potassium chloride ER (K-DUR/KLOR-CON M) CR tablet 20-40 mEq     senna-docusate (SENOKOT-S/PERICOLACE) 8.6-50 MG per tablet 1 tablet    Or     senna-docusate (SENOKOT-S/PERICOLACE) 8.6-50 MG per tablet 2 tablet     simethicone (MYLICON) chewable tablet 80 mg     sodium chloride (PF) 0.9% PF flush 3 mL     sodium chloride (PF) 0.9% PF flush 3 mL     sodium chloride 0.9% infusion     sodium phosphate (FLEET ENEMA) 1 enema     tranexamic acid (CYKLOKAPRON) Bolus 1g vial attach to NaCl 50 or 100 mL bag ADULT             Physical Exam:     Patient Vitals for the past 24 hrs:   BP Temp Temp src Pulse Heart Rate Resp SpO2   08/25/19 0951 -- -- -- -- 108 16 99 %   08/25/19 0758 99/63 98.5  F (36.9  C) Oral -- 109 16 99 %   08/25/19 0410 100/72 98.6  F (37  C) Oral 103 -- 14 100 %   08/25/19 0220 96/63 -- -- 93 -- -- 99 %   08/25/19 0015 (!) 89/57 97.8  F (36.6  C) -- 88 -- -- 100 %   08/24/19 2215 (!) 88/56 -- -- -- -- -- --   08/24/19 2210 (!) 88/58  -- -- -- 85 -- --   08/24/19 2205 94/63 -- -- -- -- -- --   08/24/19 2200 (!) 84/54 -- -- -- 79 14 100 %   08/24/19 1958 96/60 97.2  F (36.2  C) Oral 101 -- 16 98 %   08/24/19 1600 92/71 -- -- -- 103 15 100 %   08/24/19 1500 98/66 -- -- 98 99 14 91 %   08/24/19 1400 97/61 -- -- 98 98 15 100 %   08/24/19 1330 92/67 -- -- 103 90 10 97 %   08/24/19 1328 97/59 -- -- 87 95 8 --   08/24/19 1325 (!) 82/64 -- -- 128 137 11 --   08/24/19 1323 96/62 -- -- 129 122 17 --   08/24/19 1200 (!) 88/58 98.3  F (36.8  C) Oral 95 97 12 100 %   TYREL Drain: 8ml in 24 hours  Urine output: 2000ml in 8 hours  GEN; NAD  CV: tachycardic with regular rhythm  RESP: CTAB  GI: soft, non-distended. Drainage from TYREL site is like suprafascial. Incision is clean, dry and intact  : Urine is clearer with a minimal tinge to it in the tubing.  Ext: non-tender          Data:     Hemoglobin   Date Value Ref Range Status   08/25/2019 8.1 (L) 11.7 - 15.7 g/dL Final   08/24/2019 8.9 (L) 11.7 - 15.7 g/dL Final   08/24/2019 9.2 (L) 11.7 - 15.7 g/dL Final   08/24/2019 9.5 (L) 11.7 - 15.7 g/dL Final   08/24/2019 11.6 (L) 11.7 - 15.7 g/dL Final     -    Julisa Joel MD

## 2019-08-25 NOTE — PROGRESS NOTES
Urology Daily Progress Note  08/25/2019    24 hour events/Subjective:     -  Transferred out of  ICU yesterday    - catheter with pink output    - had syncopal episode while using restroom    O:  Temp:  [97.2  F (36.2  C)-98.6  F (37  C)] 98.6  F (37  C)  Pulse:  [] 103  Heart Rate:  [] 85  Resp:  [8-24] 14  BP: ()/(54-75) 100/72  MAP:  [58 mmHg-60 mmHg] 58 mmHg  Arterial Line BP: (84-89)/(45) 84/45  SpO2:  [91 %-100 %] 100 %  General: Alert, interactive, & in NAD  Resp: Breathing is non-labored on RA  : catheter with red output. TYREL with SSD   Extremities: No LE edema or obvious joint abnormalities  Skin: Warm and dry, no jaundice or rash     Ins & Outs (24 hours/since MN)  UOP  5170/NR  Drain  8/NR    Labs/Imaging  BMP  Recent Labs   Lab 08/25/19  0610 08/24/19  0310 08/24/19  0216 08/24/19  0209  08/23/19  2145  08/23/19  1241 08/23/19  0352    142 137 136   < > 139  --   --  139   POTASSIUM 4.1 4.9 5.2 5.2   < > 4.3   < >  --  3.3*   CHLORIDE 115* 116*  --   --   --  110*  --   --  108   MITZI 7.8* 7.3*  --   --   --  7.8*  --   --  8.5   CO2 25 18*  --   --   --  23  --   --  20   BUN 7 7  --   --   --  10  --   --  9   CR 0.51* 0.58  --   --   --  0.57  --   --  0.54   MAG  --   --   --   --   --   --   --  2.3 1.6   PHOS  --   --   --   --   --   --   --   --  2.8   GLC 85 195*  --   --   --  120*  --   --  112*    < > = values in this interval not displayed.     CBC  Recent Labs   Lab 08/25/19  0610 08/24/19  2041 08/24/19  1430 08/24/19  1026 08/24/19  0310  08/23/19  2359   WBC 14.4*  --   --  15.0* 17.0*  --  22.4*   HGB 8.1* 8.9* 9.2* 9.5* 11.6*   < > 9.6*   HCT 23.2*  --   --  27.0* 33.8*  --  28.0*   MCV 91  --   --  91 93  --  94   *  --   --  110* 105*  --  196    < > = values in this interval not displayed.     INR  Recent Labs   Lab 08/24/19  0310 08/24/19  0023 08/23/19  2330   INR 1.23* 1.27* Canceled, Test credited        Assessment/Plan  33 year old female POD #2  s/p bladder repair and bilateral stent placement for injury during  . Doing well.    PLAN:  - continue catheter for 2 weeks, will need cystogram prior to catheter removal   - TYREL can be removed   - will arrange for outpatient follow up with Dr Mireles       Will discuss with Dr. Mireles.    --    Dodie Gomez MD   Urology Resident    9:49 AM, 2019

## 2019-08-25 NOTE — PROGRESS NOTES
[facility]  Hospitalist Progress Note        Marina Chamberlain MD  2019        Interval History:      Denied any complaints   No chest pain or sob  Currently getting IV iron         Assessment and Plan:             33 year old female admitted on 2019. She has no significant past medical history and  40 weeks pregnant who presented to labor and delivery with increased lethargy, confusion, active labor, and atrial fibrillation with rapid ventricular response.       Atrial fibrillation with rapid ventricular response prepartum:   RRT AM 2019 for borderline hypotension with 12 lead EKG demonstrating atrial fibrillation with rapid ventricular response.   It's not clear for how long the patient has been in atrial fibrillation; therefore, initial goal wsa to opt for rate control over rhythm control.    TTE with EF 55-60%.   Patient  spontaneously converted to NSR prior to birth on 2019.    She is otherwise asymptomatic.    Elevated TSH however T4 is wnl.    UA negative.   - diltiazem drip was then discontinued after she returned to NSR.   - Patient was seen by cardiology  -Echocardiogram showed no structural abnormalities  - per cardiology  current mild hypotension, presyncope and sinus tachycardia are related to a ongoing cardiac process.  This is likely reflective of her blood loss, volume depletion and vaso-plegia from the surgery and all the recent stress the patient is undergone.  -Recommend a ZIO Patch prior to discharge.           Acute encephalopathy:  - Resolved.      Post partum now s/p delivery    -managed by the primary service OB/GYN.    Postop day #2 status post bladder repair and bilateral stent placement for injury during   Urology recommend continuing catheter for another 2 weeks  She will need cystogram prior to catheter removal  TYREL drain was removed today  She will follow-up with urology as outpatient     Diet:   Regular     DVT Prophylaxis:  - Defer  "to primary service    Medina Catheter:   in place, indication: Recommended by urology  Will stay for 2 weeks due to recent injury to his ureter    Code Status:   -Full Code          Disposition Plan  Per primary service  Possibly in 1 to 2 days       Discussed the care with patient , her  and RN       Physical Exam:      Heart Rate: 88, Blood pressure 104/70, pulse 103, temperature 98.7  F (37.1  C), temperature source Oral, resp. rate 16, height 1.765 m (5' 9.5\"), weight 72.6 kg (160 lb), last menstrual period 11/16/2018, SpO2 99 %, unknown if currently breastfeeding.  Vitals:    08/23/19 0200   Weight: 72.6 kg (160 lb)     Vital Signs with Ranges  Temp:  [97.2  F (36.2  C)-98.7  F (37.1  C)] 98.7  F (37.1  C)  Pulse:  [] 103  Heart Rate:  [] 88  Resp:  [14-16] 16  BP: ()/(54-72) 104/70  SpO2:  [91 %-100 %] 99 %  I/O's Last 24 hours  I/O last 3 completed shifts:  In: 2003.75 [P.O.:480; I.V.:1523.75]  Out: 5178 [Urine:5170; Drains:8]    Constitutional: Alert awake oriented     Lungs: Good air entry on both sides of lungs     Cardiovascular: S1 and S2 no S3      Abdomen:    Skin:    Other:           Medications:          acetaminophen  975 mg Oral Q8H     iron sucrose (VENOFER) intermittent infusion (200 mg)  200 mg Intravenous Q24H     sodium chloride (PF)  3 mL Intracatheter Q8H     PRN Meds: [START ON 8/26/2019] acetaminophen, bisacodyl, diphenhydrAMINE, EPINEPHrine, famotidine, hydrocortisone, ibuprofen, lactated ringers, lanolin, lidocaine 4%, lidocaine (buffered or not buffered), magnesium sulfate, methylPREDNISolone, - MEDICATION INSTRUCTIONS -, NO Rho (D) immune globulin (RhoGam) needed - mother Rh POSITIVE, - MEDICATION INSTRUCTIONS -, oxyCODONE, oxytocin in 0.9% NaCl, oxytocin, potassium chloride, potassium chloride with lidocaine, potassium chloride, potassium chloride, potassium chloride, senna-docusate **OR** senna-docusate, simethicone, sodium chloride (PF), sodium phosphate, " Tranexamic Acid         Data:      All new lab and imaging data was reviewed.   Recent Labs   Lab Test 08/25/19  0610 08/24/19  2041 08/24/19  1430 08/24/19  1026 08/24/19  0310  08/24/19  0023  08/23/19  2330   WBC 14.4*  --   --  15.0* 17.0*  --   --    < >  --    HGB 8.1* 8.9* 9.2* 9.5* 11.6*   < >  --    < >  --    MCV 91  --   --  91 93  --   --    < >  --    *  --   --  110* 105*  --   --    < >  --    INR  --   --   --   --  1.23*  --  1.27*  --  Canceled, Test credited    < > = values in this interval not displayed.      Recent Labs   Lab Test 08/25/19  0610 08/24/19  0310 08/24/19  0216  08/23/19  2145    142 137   < > 139   POTASSIUM 4.1 4.9 5.2   < > 4.3   CHLORIDE 115* 116*  --   --  110*   CO2 25 18*  --   --  23   BUN 7 7  --   --  10   CR 0.51* 0.58  --   --  0.57   ANIONGAP 3 8  --   --  6   MITZI 7.8* 7.3*  --   --  7.8*   GLC 85 195*  --   --  120*    < > = values in this interval not displayed.     No lab results found.    Invalid input(s): TROP, TROPONINIES

## 2019-08-25 NOTE — PLAN OF CARE
"VSS, BP improving from this AM. Able to ambulate with SBA, denies dizziness. States pain is well controlled with Tylenol, Ibuprofen, prn oxy, and hot packs to the abdomen. Tolerating regular diet, but going slow with food as she hasn't eaten much in 3+days. Pt states abdomen feels slightly distended and \"gassy\".  Encouraged ambulation in room with SBA and sitting up in chair. Incision intact with steri strips, no drainage. Swelling under incision, using IP and abdominal binder. IV SL'd. IV Fe given today due to hgb = 8.1. TYREL drain removed today by MD, 2x2s and tape dry and intact over site. Urine dark peach color this AM at start of shift, changed to clear yellow during late morning/early afternoon, and is now back to dark peach/maroon color.  See previous note for episode of pain/mcmahon emptying.  Urology MD paged for change in output color, but no response. Dr. Joel then paged and notified of change. See provider notification note. Mcmahon output and position monitored closely.  here and supportive. Breastfeeding baby well with minimal assist. Cont to monitor.   "

## 2019-08-25 NOTE — PROVIDER NOTIFICATION
08/24/19 2247   Provider Notification   Provider Name/Title Dr. Nielsen   Method of Notification Phone   Request Evaluate-Remote   Notification Reason Status Update;Vital Signs Change      paged regarding pt syncopal episode while in bathroom. BP 84/54, most recently 88/56. Orders via telephone w/ read back for albumin. Encourage PO fluids. Pt has CBC and CMP ordered for am draw.   Will call Dr back if pt still not feeling well after albumin.

## 2019-08-26 ENCOUNTER — TELEPHONE (OUTPATIENT)
Dept: OBGYN | Facility: CLINIC | Age: 34
End: 2019-08-26

## 2019-08-26 DIAGNOSIS — Z98.890 S/P BLADDER REPAIR: Primary | ICD-10-CM

## 2019-08-26 LAB
ABO + RH BLD: NORMAL
ABO + RH BLD: NORMAL
ALBUMIN SERPL-MCNC: 1.9 G/DL (ref 3.4–5)
ALP SERPL-CCNC: 64 U/L (ref 40–150)
ALT SERPL W P-5'-P-CCNC: 18 U/L (ref 0–50)
ANION GAP SERPL CALCULATED.3IONS-SCNC: 6 MMOL/L (ref 3–14)
AST SERPL W P-5'-P-CCNC: 36 U/L (ref 0–45)
BILIRUB SERPL-MCNC: 0.4 MG/DL (ref 0.2–1.3)
BLD GP AB SCN SERPL QL: NORMAL
BLD PROD TYP BPU: NORMAL
BLD UNIT ID BPU: 0
BLD UNIT ID BPU: 0
BLOOD BANK CMNT PATIENT-IMP: NORMAL
BLOOD PRODUCT CODE: NORMAL
BLOOD PRODUCT CODE: NORMAL
BPU ID: NORMAL
BPU ID: NORMAL
BUN SERPL-MCNC: 6 MG/DL (ref 7–30)
CALCIUM SERPL-MCNC: 7.6 MG/DL (ref 8.5–10.1)
CHLORIDE SERPL-SCNC: 115 MMOL/L (ref 94–109)
CO2 SERPL-SCNC: 24 MMOL/L (ref 20–32)
CREAT SERPL-MCNC: 0.55 MG/DL (ref 0.52–1.04)
ERYTHROCYTE [DISTWIDTH] IN BLOOD BY AUTOMATED COUNT: 14.4 % (ref 10–15)
GFR SERPL CREATININE-BSD FRML MDRD: >90 ML/MIN/{1.73_M2}
GLUCOSE SERPL-MCNC: 75 MG/DL (ref 70–99)
HCT VFR BLD AUTO: 23.4 % (ref 35–47)
HGB BLD-MCNC: 8 G/DL (ref 11.7–15.7)
MCH RBC QN AUTO: 31.6 PG (ref 26.5–33)
MCHC RBC AUTO-ENTMCNC: 34.2 G/DL (ref 31.5–36.5)
MCV RBC AUTO: 93 FL (ref 78–100)
NUM BPU REQUESTED: 1
NUM BPU REQUESTED: 5
PLATELET # BLD AUTO: 181 10E9/L (ref 150–450)
POTASSIUM SERPL-SCNC: 3.5 MMOL/L (ref 3.4–5.3)
PROT SERPL-MCNC: 4.3 G/DL (ref 6.8–8.8)
RBC # BLD AUTO: 2.53 10E12/L (ref 3.8–5.2)
SODIUM SERPL-SCNC: 145 MMOL/L (ref 133–144)
SPECIMEN EXP DATE BLD: NORMAL
TRANSFUSION STATUS PATIENT QL: NORMAL
WBC # BLD AUTO: 12 10E9/L (ref 4–11)

## 2019-08-26 PROCEDURE — 12000035 ZZH R&B POSTPARTUM

## 2019-08-26 PROCEDURE — 99233 SBSQ HOSP IP/OBS HIGH 50: CPT | Performed by: INTERNAL MEDICINE

## 2019-08-26 PROCEDURE — 99207 ZZC CDG-MDM COMPONENT: MEETS MODERATE - UP CODED: CPT | Performed by: INTERNAL MEDICINE

## 2019-08-26 PROCEDURE — 85027 COMPLETE CBC AUTOMATED: CPT | Performed by: OBSTETRICS & GYNECOLOGY

## 2019-08-26 PROCEDURE — 25000132 ZZH RX MED GY IP 250 OP 250 PS 637: Performed by: OBSTETRICS & GYNECOLOGY

## 2019-08-26 PROCEDURE — 36415 COLL VENOUS BLD VENIPUNCTURE: CPT | Performed by: OBSTETRICS & GYNECOLOGY

## 2019-08-26 PROCEDURE — 80053 COMPREHEN METABOLIC PANEL: CPT | Performed by: OBSTETRICS & GYNECOLOGY

## 2019-08-26 PROCEDURE — P9016 RBC LEUKOCYTES REDUCED: HCPCS | Performed by: OBSTETRICS & GYNECOLOGY

## 2019-08-26 RX ADMIN — ACETAMINOPHEN 650 MG: 325 TABLET ORAL at 20:44

## 2019-08-26 RX ADMIN — OXYCODONE HYDROCHLORIDE 5 MG: 5 TABLET ORAL at 15:01

## 2019-08-26 RX ADMIN — IBUPROFEN 800 MG: 400 TABLET ORAL at 22:02

## 2019-08-26 RX ADMIN — ACETAMINOPHEN 975 MG: 325 TABLET ORAL at 13:17

## 2019-08-26 RX ADMIN — IBUPROFEN 800 MG: 400 TABLET ORAL at 16:18

## 2019-08-26 RX ADMIN — SENNOSIDES AND DOCUSATE SODIUM 1 TABLET: 8.6; 5 TABLET ORAL at 20:41

## 2019-08-26 RX ADMIN — ACETAMINOPHEN 975 MG: 325 TABLET ORAL at 03:33

## 2019-08-26 RX ADMIN — SENNOSIDES AND DOCUSATE SODIUM 1 TABLET: 8.6; 5 TABLET ORAL at 09:48

## 2019-08-26 RX ADMIN — IBUPROFEN 800 MG: 400 TABLET ORAL at 03:32

## 2019-08-26 RX ADMIN — IBUPROFEN 800 MG: 400 TABLET ORAL at 09:48

## 2019-08-26 NOTE — PLAN OF CARE
BP stable, HR tachy . Hgb. 8.0, feeling slightly dizzy when up to BR this am, tired. Spoke with Dr. Hubbard and updated on labs, plan to transfuse 1 unit PRBC's, infusion currently running, pt. tolerating well. Hospitalist at bedside this afternoon and RN spoke with her about Venofer. Plan to transfuse PRBC's today and hold Venofer today. Taking Tylenol and Ibuprofen for pain. Inc. intact with steri strips. Drsg. removed from TYREL drain site and steri strip applied per MD roa. This am mcmahon was noted to not be draining, assisted pt up to BR and she became tearful and crying out in pain stating she felt like she had to pee and couldn't. Assisted back to bed and urology paged, Dr. Gomez returned call and was in hospital and stated he would come right down. Dr. Gomez irrigated pt's. bladder for multiple dark clots. Pt. feeling better. After he left the floor pt. called RN and stated she had a clot in the catheter. RN went to room to examine and urine noted to be cherry colored and clots in catheter tube. Dr. Gomez was paged and notified, he stated it was not concerning. Urine continued to lighten in color this am is now light glenn. Output is at 1350ml. Have orders to irrigate if mcmahon stops draining and pt. has full sensation. May consider spasm med. if this happens again. Breastfeeding her baby boy. Does not have much appetite, abd. distended, plan to amb. In [place] this afternoon, wearing pneumo boots in bed. Tearful this afternoon, emotional support provided.

## 2019-08-26 NOTE — PROGRESS NOTES
[facility]  Cardiology Progress Note  Date of Service: 2019      Assessment & Plan    Debbi Grossman is a 33 year old female that was admitted on 2019 for active labor. She received her epidural and was noted to be in atrial fibrillation with a rapid ventricular rate. She received IV dilt and spontaneously converted to SR. She underwent a  and was transferred to ICU for tachycardia and hemorrhagic shock.     Assessment and Plan:   1. Atrial fibrillation with -130 bpm    Initially given IV diltiazem then amiodarone     Spontaneously converted to SR during labor    Echo  showed a normal LV size and function. LVEF 55-60%. RA mildly dilated    Tele  bpm     CHADS VASDC score is 0     2. Postpartum, post op #3    S/p      Extensive surgery for bladder repair and cervical laceration    Urology following    3. Hemorrhagic shock, related to #2     HgB 8.0     She received 1 unit PRBC    IV iron     Plan:  1. ZioPatch at discharge  2. Ok to discontinue aspirin  3. Cardiology will sign off. Please contact for further questions          Follow-up with ARIANE Berry on 19 at 10:50      ASHLEY Yoon CNP  Pager:  [phone]   Text Page  (7am - 5pm, M-F)      Interval History   Resting in bed with her baby. No complaints     Physical Exam   Temp: 97.9  F (36.6  C) Temp src: Oral BP: 114/80   Heart Rate: 91 Resp: 18 SpO2: 100 %      Vitals:    19 0200   Weight: 72.6 kg (160 lb)       Constitutional:   NAD   Skin:   Warm and dry   Head:   Nontraumatic   Neck:   supple, symmetrical, trachea midline   Lungs:   symmetric, clear to auscultation   Cardiovascular:   regular rate and rhythm, normal S1 and S2 and no murmur noted   Abdomen:   Benign   Extremities and Back:   Edema 2+   Neurological:   Grossly nonfocal       Medications     - MEDICATION INSTRUCTIONS -       NO Rho (D) immune globulin (RhoGam) needed - mother Rh POSITIVE       - MEDICATION  INSTRUCTIONS -       oxytocin in 0.9% NaCl Stopped (08/23/19 3992)     oxytocin in 0.9% NaCl         acetaminophen  975 mg Oral Q8H     iron sucrose (VENOFER) intermittent infusion (200 mg)  200 mg Intravenous Q24H     sodium chloride (PF)  3 mL Intracatheter Q8H       Data     Most Recent 3 CBC's:  Recent Labs   Lab Test 08/26/19  0830 08/25/19  0610 08/24/19  2041  08/24/19  1026   WBC 12.0* 14.4*  --   --  15.0*   HGB 8.0* 8.1* 8.9*   < > 9.5*   MCV 93 91  --   --  91    131*  --   --  110*    < > = values in this interval not displayed.     Most Recent 3 BMP's:  Recent Labs   Lab Test 08/26/19  0830 08/25/19  0610 08/24/19  0310   * 143 142   POTASSIUM 3.5 4.1 4.9   CHLORIDE 115* 115* 116*   CO2 24 25 18*   BUN 6* 7 7   CR 0.55 0.51* 0.58   ANIONGAP 6 3 8   MITZI 7.6* 7.8* 7.3*   GLC 75 85 195*

## 2019-08-26 NOTE — PROGRESS NOTES
Urology Daily Progress Note  2019    24 hour events/Subjective:     - catheter replaced overnight for full sensation    - drain removed    - had some clots in catheter this morning, irrigated out   O:  Temp:  [97.7  F (36.5  C)-98.7  F (37.1  C)] 97.7  F (36.5  C)  Heart Rate:  [] 96  Resp:  [16] 16  BP: ()/(51-78) 100/67  SpO2:  [99 %-100 %] 100 %  General: Alert, interactive, & in NAD  Resp: Breathing is non-labored on RA  : catheter with red output. TYREL with SSD   Extremities: No LE edema or obvious joint abnormalities  Skin: Warm and dry, no jaundice or rash     Ins & Outs (24 hours/since MN)  UOP  7000/NR      Labs/Imaging  BMP  Recent Labs   Lab 19  0610 19  0310 19  0216 19  0209  19  2145  19  1241 19  0352    142 137 136   < > 139  --   --  139   POTASSIUM 4.1 4.9 5.2 5.2   < > 4.3   < >  --  3.3*   CHLORIDE 115* 116*  --   --   --  110*  --   --  108   MITZI 7.8* 7.3*  --   --   --  7.8*  --   --  8.5   CO2 25 18*  --   --   --  23  --   --  20   BUN 7 7  --   --   --  10  --   --  9   CR 0.51* 0.58  --   --   --  0.57  --   --  0.54   MAG  --   --   --   --   --   --   --  2.3 1.6   PHOS  --   --   --   --   --   --   --   --  2.8   GLC 85 195*  --   --   --  120*  --   --  112*    < > = values in this interval not displayed.     CBC  Recent Labs   Lab 19  0610 19  2041 19  1430 19  1026 19  0310  19  2359   WBC 14.4*  --   --  15.0* 17.0*  --  22.4*   HGB 8.1* 8.9* 9.2* 9.5* 11.6*   < > 9.6*   HCT 23.2*  --   --  27.0* 33.8*  --  28.0*   MCV 91  --   --  91 93  --  94   *  --   --  110* 105*  --  196    < > = values in this interval not displayed.     INR  Recent Labs   Lab 19  0310 19  0023 19  2330   INR 1.23* 1.27* Canceled, Test credited        Assessment/Plan  33 year old female POD #3 s/p bladder repair and bilateral stent placement for injury during  . Doing  well.    PLAN:  - continue catheter for 2 weeks, will need cystogram prior to catheter removal   - if catheter clots again, will need to upsize   - will arrange for outpatient follow up with Dr Mireles in 2 weeks for surgery date       Will discuss with Dr. Mireles.    --    Dodie Gomez MD   Urology Resident    9:49 AM, 08/26/2019

## 2019-08-26 NOTE — PLAN OF CARE
Vital signs stable. Incision with steri strips in place. Bleeding wnl. Up in room independently. Walked with stand by assist in halls. Iv saline locked and flushed without difficulties. Medina patent and draining pink to clear yellow urine with a few clots noted. Tolerating a regular diet. pneumoboots on while in bed. Breast feeding and also started pumping this shift. Taking tylenol, ibuprofen and the occasional oxycodone for pain control. Using an abdominal binder.  here and supportive. Encouraged to call with any questions or concerns. Will continue to monitor.

## 2019-08-26 NOTE — PROGRESS NOTES
[facility]  Hospitalist Progress Note        Diandra Leavitt MD  2019        Interval History:      Had some dizziness yesterday, none since yesterday, her hemoglobin dropped to 8, plan for blood transfusion as per OB/GYN.  Has been near bedside, she has increased lower extremity edema, no palpitations noted.  No syncopal events over last 24 hours.  She is planning to ambulate later.  Had urinary retention overnight, and urology  irrigated.  It was clear urine in the beginning now she is noticing some hematuria.         Assessment and Plan:          33 year old female admitted on 2019. She has no significant past medical history and  40 weeks pregnant who presented to labor and delivery with increased lethargy, confusion, active labor, and atrial fibrillation with rapid ventricular response.       Atrial fibrillation with rapid ventricular response prepartum:   RRT AM 2019 for borderline hypotension with 12 lead EKG demonstrating atrial fibrillation with rapid ventricular response.   It's not clear for how long the patient has been in atrial fibrillation; therefore, initial goal wsa to opt for rate control over rhythm control.    TTE with EF 55-60%.   Patient  spontaneously converted to NSR prior to birth on 2019.    She is otherwise asymptomatic.    Elevated TSH however T4 is wnl.    UA negative.   - diltiazem drip was then discontinued after she returned to NSR.   - Patient was seen by cardiology  -Echocardiogram showed no structural abnormalities  - per cardiology  -Currently vitals are stable, and symptoms are slowly improving, the plan is to transfuse 1 unit PRBC.  -Recommend a ZIO Patch prior to discharge.      Acute blood loss anemia  --Postsurgical, associated hematuria , will check for signs of hemolysis.  --And to transfuse 1 unit PRBC now, IV iron has been ordered, continue that till discharge 2-3 doses.  --Repeat labs in few weeks to evaluate for hemoglobin.   "Hemoglobin ordered for tomorrow morning     Acute encephalopathy:  - Resolved.      Post partum now s/p delivery    -managed by the primary service OB/GYN.    Postop day #3 status post bladder repair and bilateral stent placement for injury during   Urology recommend continuing catheter for another 2 weeks  She will need cystogram prior to catheter removal  TYREL drain was removed   She will follow-up with urology as outpatient     Hyponatremia  --- Possibly secondary to IV fluids, will  encourage increased oral intake, free water intake.  She does have significant edema which will improve in 1 to 2 weeks.  --Lasix use as per OB    DVT Prophylaxis:  - Defer to primary service    Medina Catheter:   in place, indication: Recommended by urology  Will stay for 2 weeks due to recent injury to his ureter    Code Status:   -Full Code          Disposition Plan  Per primary service  Possibly in 1 to 2 days       Discussed the care with patient , her  and RN       Physical Exam:      Heart Rate: 96, Blood pressure 100/67, pulse 103, temperature 97.7  F (36.5  C), temperature source Oral, resp. rate 16, height 1.765 m (5' 9.5\"), weight 72.6 kg (160 lb), last menstrual period 2018, SpO2 100 %, unknown if currently breastfeeding.  Vitals:    19 0200   Weight: 72.6 kg (160 lb)     Vital Signs with Ranges  Temp:  [97.7  F (36.5  C)-98.7  F (37.1  C)] 97.7  F (36.5  C)  Heart Rate:  [] 96  Resp:  [16] 16  BP: ()/(51-78) 100/67  SpO2:  [100 %] 100 %  I/O's Last 24 hours  I/O last 3 completed shifts:  In: 500 [P.O.:500]  Out: 7000 [Urine:7000]  Constitutional: Awake, alert, cooperative, no apparent distress  Respiratory: Clear to auscultation bilaterally, no crackles or wheezing  Cardiovascular: Regular rate and rhythm, normal S1 and S2, and no murmur noted  GI: Status post , low transverse, Medina catheter present, mild hematuria noted.  There is a macular rash on her abdominal " wall.  Skin/Integumen: No rashes, no cyanosis, no edema  Neuro : moving all 4 extremities, no focal deficit noted            Medications:          acetaminophen  975 mg Oral Q8H     iron sucrose (VENOFER) intermittent infusion (200 mg)  200 mg Intravenous Q24H     sodium chloride (PF)  3 mL Intracatheter Q8H     PRN Meds: acetaminophen, bisacodyl, diphenhydrAMINE, EPINEPHrine, famotidine, hydrocortisone, ibuprofen, lactated ringers, lanolin, lidocaine 4%, lidocaine (buffered or not buffered), magnesium sulfate, methylPREDNISolone, - MEDICATION INSTRUCTIONS -, NO Rho (D) immune globulin (RhoGam) needed - mother Rh POSITIVE, - MEDICATION INSTRUCTIONS -, oxyCODONE, oxytocin in 0.9% NaCl, oxytocin, potassium chloride, potassium chloride with lidocaine, potassium chloride, potassium chloride, potassium chloride, senna-docusate **OR** senna-docusate, simethicone, sodium chloride (PF), sodium phosphate, Tranexamic Acid         Data:      All new lab and imaging data was reviewed.   Recent Labs   Lab Test 08/26/19  0830 08/25/19  0610 08/24/19  2041  08/24/19  1026 08/24/19  0310  08/24/19  0023  08/23/19  2330   WBC 12.0* 14.4*  --   --  15.0* 17.0*  --   --    < >  --    HGB 8.0* 8.1* 8.9*   < > 9.5* 11.6*   < >  --    < >  --    MCV 93 91  --   --  91 93  --   --    < >  --     131*  --   --  110* 105*  --   --    < >  --    INR  --   --   --   --   --  1.23*  --  1.27*  --  Canceled, Test credited    < > = values in this interval not displayed.      Recent Labs   Lab Test 08/26/19  0830 08/25/19  0610 08/24/19  0310   * 143 142   POTASSIUM 3.5 4.1 4.9   CHLORIDE 115* 115* 116*   CO2 24 25 18*   BUN 6* 7 7   CR 0.55 0.51* 0.58   ANIONGAP 6 3 8   MITZI 7.6* 7.8* 7.3*   GLC 75 85 195*     No lab results found.    Invalid input(s): TROP, TROPONINIES

## 2019-08-26 NOTE — PROVIDER NOTIFICATION
"   08/25/19 4401   Provider Notification   Provider Name/Title Dr. Joel   Method of Notification Phone   Request Evaluate-Remote   Notification Reason Other     Called to patients room d/t increased pain and \"peeing sensation\".  Paged Dr. Joel and received an order to replace catheter.  New catheter placed at 2245 and 900 mL drained.  Patient more comfortable.  Oxycodone given.   Patient is resting. Will continue to monitor.   "

## 2019-08-26 NOTE — PROGRESS NOTES
[facility] Post-Op / Progress Note         Assessment and Plan:    Assessment and plan:   Post-operative day #3  Low transverse primary  section   L&D complications: Atrial fibrillation on admission.  Take-back for bladder repair and repair of cervical laceration -  Acute blood loss anemia      Bladder repair: unclear what Urology recommendations are in terms of antispasmodics however, mcmahon catheter was replaced overnight due to obstruction. Urine is darker colored than last night. Will need Urology follow up arranged prior to discharge. Question about whether or not she needs prophylactic antibiotics due to the mcmahon needing to be in place for 2 weeks.    Post-op: Will continue IV iron but the response will not be immediate.  AM hemoglobin has not been drawn. I recommended to Debbi that we transfuse another unit of blood if it is less than 8. She is making adequate urine output therefore ongoing intra-abdominal blood loss is not a concern. There could be ongoing blood loss from the bladder however.    Cardiology: appreciate recommendations and coordination of ZIO patch prior to discharge. Her mild tachycardia is likely due to acute blood loss anemia.     Disposition: pending stable hemoglobin and no urinary concerns for at least 24 hours. Will give sign out to her primary OB Dr. Farris who will resume care of the patient as of . As well as to the on call MD Dr. Hubbard.             Interval History:   No vasovagal episodes overnight. The catheter was clogged overnight and had to be replaced. Tolerating more meals. Passing flatus. Breastfeeding. Feels stronger and is moving more. Worried about having concerns with the mcmahon catheter at home similar to last night.           Significant Problems:    as above          Review of Systems:    The Review of Systems is negative other than noted in the HPI          Medications:     Current Facility-Administered Medications    Medication     acetaminophen (TYLENOL) tablet 650 mg     acetaminophen (TYLENOL) tablet 975 mg     bisacodyl (DULCOLAX) Suppository 10 mg     diphenhydrAMINE (BENADRYL) injection 50 mg     EPINEPHrine (ADRENALIN) kit 0.3 mg     famotidine (PEPCID) injection 20 mg     hydrocortisone 2.5 % cream     ibuprofen (ADVIL/MOTRIN) tablet 800 mg     iron sucrose (VENOFER) 200 mg in sodium chloride 0.9 % 100 mL intermittent infusion     lactated ringers BOLUS 1,000 mL     lanolin ointment     lidocaine (LMX4) cream     lidocaine 1 % 0.1-1 mL     magnesium sulfate 4 g in 100 mL sterile water (premade)     methylPREDNISolone sodium succinate (solu-MEDROL) injection 125 mg     No MMR Needed -  Assessment: Patient does not need MMR vaccine     NO Rho (D) immune globulin (RhoGam) needed - mother Rh POSITIVE     No Tdap Needed - Assessment: Patient does not need Tdap vaccine     oxyCODONE (ROXICODONE) tablet 5-10 mg     oxytocin (PITOCIN) 30 units in 500 mL 0.9% NaCl infusion     oxytocin (PITOCIN) 30 units in 500 mL 0.9% NaCl infusion     oxytocin (PITOCIN) injection 10 Units     potassium chloride (KLOR-CON) Packet 20-40 mEq     potassium chloride 10 mEq in 100 mL intermittent infusion with 10 mg lidocaine     potassium chloride 10 mEq in 100 mL sterile water intermittent infusion (premix)     potassium chloride 20 mEq in 50 mL intermittent infusion     potassium chloride ER (K-DUR/KLOR-CON M) CR tablet 20-40 mEq     senna-docusate (SENOKOT-S/PERICOLACE) 8.6-50 MG per tablet 1 tablet    Or     senna-docusate (SENOKOT-S/PERICOLACE) 8.6-50 MG per tablet 2 tablet     simethicone (MYLICON) chewable tablet 80 mg     sodium chloride (PF) 0.9% PF flush 3 mL     sodium chloride (PF) 0.9% PF flush 3 mL     sodium phosphate (FLEET ENEMA) 1 enema     tranexamic acid (CYKLOKAPRON) Bolus 1g vial attach to NaCl 50 or 100 mL bag ADULT             Physical Exam:     Patient Vitals for the past 24 hrs:   BP Temp Temp src Heart Rate Resp SpO2    08/26/19 0333 100/67 97.7  F (36.5  C) Oral 96 16 --   08/25/19 2325 93/58 97.8  F (36.6  C) Oral 83 16 --   08/25/19 2000 117/51 98.3  F (36.8  C) Oral 100 16 100 %   08/25/19 1600 110/72 98.5  F (36.9  C) Oral 99 16 --   08/25/19 1400 106/78 -- -- 103 16 --   08/25/19 1345 109/74 -- -- 101 16 --   08/25/19 1330 108/65 -- -- 101 16 --   08/25/19 1314 104/70 98.7  F (37.1  C) Oral 88 16 --   08/25/19 1156 108/72 98.2  F (36.8  C) Oral 103 16 --   08/25/19 0951 -- -- -- 108 16 99 %   08/25/19 0758 99/63 98.5  F (36.9  C) Oral 109 16 99 %     GEN: NAD  CV: RRR this AM, 2+ pitting edema to knees bilaterally.  RESP: CTAB  GI: soft, non-distended. Pfannenstiel is clean/ dry and intact and fundus is non-palpable.  TYREL drain site is covered with bandage and is dry.  : maroon colored urine is present  Ext: non-tender          Data:     Hemoglobin   Date Value Ref Range Status   08/25/2019 8.1 (L) 11.7 - 15.7 g/dL Final   08/24/2019 8.9 (L) 11.7 - 15.7 g/dL Final   08/24/2019 9.2 (L) 11.7 - 15.7 g/dL Final   08/24/2019 9.5 (L) 11.7 - 15.7 g/dL Final   08/24/2019 11.6 (L) 11.7 - 15.7 g/dL Final     -    Julisa Joel MD

## 2019-08-26 NOTE — LACTATION NOTE
Follow up visit.  Infant latched and feeding well on R side at time of visit.  Debbi received 1 unit of blood today.  Reviewed possibility of milk coming in late or having a lower supply initially due to stress on her body.  Suggested that Debbi start pumping after feeding a few times a day to help get her milk in.  Debbi has a Spectra pump for home that is here and hospital grade pump in room.  She had no questions today and said she would start pumping.  Reviewed signs infant is getting enough and indications for supplementation.   Will continue to follow.  /Janae Barcenas RN, IBCLC

## 2019-08-26 NOTE — TELEPHONE ENCOUNTER
[facility] Nurse asking if  can  a Breast Pump for pt today.  Informed nurse, we do not have the breast pumps in our clinic but we can provide a Rx if needed.    She will inform the . Nothing requested at this time.  Juanita Remy RN on 8/26/2019 at 12:31 PM

## 2019-08-26 NOTE — PLAN OF CARE
VSS. New mcmahon placed overnight d/t pain discomfort and decreased urine output- see previous note. Patient doing better now.  Urine remains slightly blood tinged overnight.  Adequate output overnight.  Breastfeeding well with minimal assistance. Incision CDI with steris. Tylenol, Ibuprofen and Oxycodone given for pain. Passed on that urology would order bladder spasm medication but no new meds ordered on evenings or nights- will pass on info to day nurse. Ambulating well on evenings.  at bedside. Encouraged to call with questions or concerns, will continue to monitor.

## 2019-08-27 LAB
ANION GAP SERPL CALCULATED.3IONS-SCNC: 5 MMOL/L (ref 3–14)
BUN SERPL-MCNC: 3 MG/DL (ref 7–30)
CALCIUM SERPL-MCNC: 7.9 MG/DL (ref 8.5–10.1)
CHLORIDE SERPL-SCNC: 113 MMOL/L (ref 94–109)
CO2 SERPL-SCNC: 27 MMOL/L (ref 20–32)
CREAT SERPL-MCNC: 0.55 MG/DL (ref 0.52–1.04)
ERYTHROCYTE [DISTWIDTH] IN BLOOD BY AUTOMATED COUNT: 14.4 % (ref 10–15)
GFR SERPL CREATININE-BSD FRML MDRD: >90 ML/MIN/{1.73_M2}
GLUCOSE SERPL-MCNC: 84 MG/DL (ref 70–99)
HCT VFR BLD AUTO: 26.4 % (ref 35–47)
HGB BLD-MCNC: 9 G/DL (ref 11.7–15.7)
MCH RBC QN AUTO: 31.4 PG (ref 26.5–33)
MCHC RBC AUTO-ENTMCNC: 34.1 G/DL (ref 31.5–36.5)
MCV RBC AUTO: 92 FL (ref 78–100)
PLATELET # BLD AUTO: 210 10E9/L (ref 150–450)
POTASSIUM SERPL-SCNC: 3.5 MMOL/L (ref 3.4–5.3)
RBC # BLD AUTO: 2.87 10E12/L (ref 3.8–5.2)
RETICS # AUTO: 103.6 10E9/L (ref 25–95)
RETICS/RBC NFR AUTO: 3.6 % (ref 0.5–2)
SODIUM SERPL-SCNC: 145 MMOL/L (ref 133–144)
WBC # BLD AUTO: 9.3 10E9/L (ref 4–11)

## 2019-08-27 PROCEDURE — 25000128 H RX IP 250 OP 636: Performed by: OBSTETRICS & GYNECOLOGY

## 2019-08-27 PROCEDURE — 36415 COLL VENOUS BLD VENIPUNCTURE: CPT | Performed by: OBSTETRICS & GYNECOLOGY

## 2019-08-27 PROCEDURE — 12000035 ZZH R&B POSTPARTUM

## 2019-08-27 PROCEDURE — 25800030 ZZH RX IP 258 OP 636: Performed by: OBSTETRICS & GYNECOLOGY

## 2019-08-27 PROCEDURE — 85045 AUTOMATED RETICULOCYTE COUNT: CPT | Performed by: OBSTETRICS & GYNECOLOGY

## 2019-08-27 PROCEDURE — 85027 COMPLETE CBC AUTOMATED: CPT | Performed by: OBSTETRICS & GYNECOLOGY

## 2019-08-27 PROCEDURE — 25000132 ZZH RX MED GY IP 250 OP 250 PS 637: Performed by: OBSTETRICS & GYNECOLOGY

## 2019-08-27 PROCEDURE — 80048 BASIC METABOLIC PNL TOTAL CA: CPT | Performed by: OBSTETRICS & GYNECOLOGY

## 2019-08-27 RX ADMIN — SENNOSIDES AND DOCUSATE SODIUM 2 TABLET: 8.6; 5 TABLET ORAL at 08:17

## 2019-08-27 RX ADMIN — ACETAMINOPHEN 650 MG: 325 TABLET ORAL at 12:27

## 2019-08-27 RX ADMIN — SENNOSIDES AND DOCUSATE SODIUM 1 TABLET: 8.6; 5 TABLET ORAL at 22:08

## 2019-08-27 RX ADMIN — IBUPROFEN 800 MG: 400 TABLET ORAL at 16:57

## 2019-08-27 RX ADMIN — IBUPROFEN 800 MG: 400 TABLET ORAL at 04:15

## 2019-08-27 RX ADMIN — IBUPROFEN 800 MG: 400 TABLET ORAL at 23:12

## 2019-08-27 RX ADMIN — ACETAMINOPHEN 650 MG: 325 TABLET ORAL at 08:17

## 2019-08-27 RX ADMIN — ACETAMINOPHEN 650 MG: 325 TABLET ORAL at 04:15

## 2019-08-27 RX ADMIN — IRON SUCROSE 200 MG: 20 INJECTION, SOLUTION INTRAVENOUS at 12:27

## 2019-08-27 RX ADMIN — IBUPROFEN 800 MG: 400 TABLET ORAL at 09:58

## 2019-08-27 RX ADMIN — ACETAMINOPHEN 650 MG: 325 TABLET ORAL at 16:58

## 2019-08-27 RX ADMIN — ACETAMINOPHEN 650 MG: 325 TABLET ORAL at 23:12

## 2019-08-27 NOTE — PROGRESS NOTES
[facility]    Obstetrics Post-Op / Progress Note    Assessment & Plan   Assessment:  -4 Days Post-Op  Procedure(s):  CYSTOSCOPY  REPAIR, BLADDER  EXPLORATORY LAPAROTOMY WITH REPEAT EXPLORATORY LAPAROTOMY WITH BLADDER REPAIR  CYSTOSCOPY WITH BILATERAL RETROGRADES/ BILATERAL STENT PLACEMENT AND REMOVAL/ CYSTOGRAM/ BLADDER REPAIR  Cystoscopy, cystogram, combined   Primary  section    Doing well.  Clean wound without signs of infection.  No excessive bleeding  Pain well-controlled.    Plan:  Ambulation encouraged  Lactation consultation  Monitor wound for signs of infection  Pain control measures as needed  Reportable signs and symptoms dicussed with the patient  Continue iron supplementation --appropriate rise in hgb (up to 9.0 g/dL) after transfusion; will do one additional dose of IV iron and then discontinue IV access  Medina/leg bag teaching today  Appreciate urology assistance; will clarify with urology the need for prophylactic abx upon discharge with home catheter as well as dating of follow up with Dr. Mireles --currently scheduled 3wks postop as opposed to original plan of 2 weeks  Appreciate cardiology assistance --will have ZIO patch placed prior to discharge tomorrow; follow up already arranged  Anticipate discharge tomorrow    Natalee Farris     Interval History   Doing well.  Pain is well-controlled with oral medications.  No fevers.  No history of wound drainage, warmth or significant erythema.  Good appetite --no n/v. Less distention and +flatus.  Denies chest pain, shortness of breath, nausea or vomiting.  Ambulatory -no lightheadedness/dizziness since transfusion.  Breastfeeding/pumping well.    Medications     - MEDICATION INSTRUCTIONS -       NO Rho (D) immune globulin (RhoGam) needed - mother Rh POSITIVE       - MEDICATION INSTRUCTIONS -       oxytocin in 0.9% NaCl Stopped (19 4278)     oxytocin in 0.9% NaCl         iron sucrose (VENOFER) intermittent infusion (200  mg)  200 mg Intravenous Q24H     sodium chloride (PF)  3 mL Intracatheter Q8H       Physical Exam   Temp: 98.4  F (36.9  C) Temp src: Oral BP: 105/70   Heart Rate: 101 Resp: 16        Vitals:    08/23/19 0200   Weight: 72.6 kg (160 lb)     Vital Signs with Ranges  Temp:  [97.9  F (36.6  C)-98.4  F (36.9  C)] 98.4  F (36.9  C)  Heart Rate:  [] 101  Resp:  [16-18] 16  BP: (103-114)/(69-80) 105/70  I/O last 3 completed shifts:  In: 1750 [P.O.:1750]  Out: 4450 [Urine:4450]    Uterine fundus is firm, non-tender and at the level of the umbilicus  Incision C/D/I  Extremities Non-tender; trace edema BLE    Data   Recent Labs   Lab Test 08/23/19  0352   ABO A   RH Pos   AS Neg     Recent Labs   Lab Test 08/27/19  0749 08/26/19  0830   HGB 9.0* 8.0*     Recent Labs   Lab Test 01/29/19  1445   RUQIGG 22

## 2019-08-27 NOTE — LACTATION NOTE
Follow up visit.  Infant has been feeding well.  Milk is coming in.  Debbi pumped last night and got 45 ml.  She had no questions or concerns today.    Janae Barcenas  RN, IBCLC

## 2019-08-27 NOTE — PROGRESS NOTES
Received a consult for possible Home care needs at discharge. I met with patient and spouse. They are anticipating discharge tomorrow. They have received education on mcmahon catheter care and are awaiting leg bag teaching. Patient does not anticipate needing Home RN for the catheter at discharge but wants to see home leg bag teaching.  Updated bedside RN and they stated bedside RN staff will do legbag teaching prior to discharge    I did arrange the zio patch heart monitor. It is scheduled for 1430 on 8/28 at [facility] in [place], see AVS for appointment details and address.     Case management will continue to follow for any further discharge needs.

## 2019-08-27 NOTE — PLAN OF CARE
Patient continues to do well.  Getting pain relief from Ibuprofen and tylenol and up independently.  She will discharge with catheter in and nurse will do leg bag teaching before she discharges.  All questions answered.

## 2019-08-27 NOTE — PLAN OF CARE
Patient improving and progressing towards goals. VSS, breastfeeding well with minimal assistance, incision dressing CDI, Tylenol, Ibuprofen and Oxycodone given for pain, states satisfaction with pain management, mcmahon patent and draining adequate amount of clear urine, ambulating well- walked the halls on evenings,  at bedside, encouraged to call with questions or concerns, will continue to monitor.

## 2019-08-27 NOTE — PLAN OF CARE
VSS. Hgb now 9.0, IV Fe infusing now, ok per MD to remove SL once finished. Medina draining clear yellow urine. Irrigation education reviewed with RN from station 88, patient states comfortable with care.  Ambulating in the room and halls independently. States pain is well controlled with tylenol and ibuprofen, declines the need for oxycodone.  Passing gas.  here and supportive. Tolerating regular diet.  Baby discharged today, will stay in room with mother and father, rooming in agreement signed.  FOB aware that an adult must be with baby at all times. Breastfeeding well, pumping prn. States breasts feel heavier, milk starting to come in. Cont to monitor.

## 2019-08-27 NOTE — PROGRESS NOTES
Urology Daily Progress Note  2019    24 hour events/Subjective:     - no issues with catheter since yesterday morning    - received 1 unit of PRBC  O:  Temp:  [97.9  F (36.6  C)-98.4  F (36.9  C)] 98.2  F (36.8  C)  Heart Rate:  [] 93  Resp:  [16-18] 16  BP: (103-119)/(69-87) 119/87  General: Alert, interactive, & in NAD  Resp: Breathing is non-labored on RA  : catheter with clear output. Some sediments   Extremities: No LE edema or obvious joint abnormalities  Skin: Warm and dry, no jaundice or rash     Ins & Outs (24 hours/since MN)  UOP  4450/NR      Labs/Imaging  BMP  Recent Labs   Lab 19  0749 19  0830 19  0610 19  0310  19  1241 19  0352   * 145* 143 142   < >  --  139   POTASSIUM 3.5 3.5 4.1 4.9   < >  --  3.3*   CHLORIDE 113* 115* 115* 116*   < >  --  108   MITZI 7.9* 7.6* 7.8* 7.3*   < >  --  8.5   CO2 27 24 25 18*   < >  --  20   BUN 3* 6* 7 7   < >  --  9   CR 0.55 0.55 0.51* 0.58   < >  --  0.54   MAG  --   --   --   --   --  2.3 1.6   PHOS  --   --   --   --   --   --  2.8   GLC 84 75 85 195*   < >  --  112*    < > = values in this interval not displayed.     CBC  Recent Labs   Lab 19  0749 19  0830 19  0610 19  2041  19  1026   WBC 9.3 12.0* 14.4*  --   --  15.0*   HGB 9.0* 8.0* 8.1* 8.9*   < > 9.5*   HCT 26.4* 23.4* 23.2*  --   --  27.0*   MCV 92 93 91  --   --  91    181 131*  --   --  110*    < > = values in this interval not displayed.     INR  Recent Labs   Lab 19  0310 19  0023 19  2330   INR 1.23* 1.27* Canceled, Test credited        Assessment/Plan  33 year old female POD #4 s/p bladder repair and bilateral stent placement for injury during  . Doing well.    PLAN:  - has an appointment for  for catheter removal   - patient can learn irrigation to do at home in case of emergency    - urology will sign off     Will discuss with Dr. Mireles.    --    Dodie Gomez MD   Urology  Resident    9:49 AM, 08/27/2019

## 2019-08-28 ENCOUNTER — APPOINTMENT (OUTPATIENT)
Dept: CARDIOLOGY | Facility: CLINIC | Age: 34
End: 2019-08-28
Attending: NURSE PRACTITIONER
Payer: COMMERCIAL

## 2019-08-28 VITALS
DIASTOLIC BLOOD PRESSURE: 79 MMHG | SYSTOLIC BLOOD PRESSURE: 110 MMHG | HEIGHT: 70 IN | RESPIRATION RATE: 16 BRPM | BODY MASS INDEX: 22.9 KG/M2 | OXYGEN SATURATION: 100 % | WEIGHT: 160 LBS | TEMPERATURE: 98.2 F | HEART RATE: 103 BPM

## 2019-08-28 LAB
INTERPRETATION ECG - MUSE: NORMAL
INTERPRETATION ECG - MUSE: NORMAL

## 2019-08-28 PROCEDURE — 25000132 ZZH RX MED GY IP 250 OP 250 PS 637: Performed by: OBSTETRICS & GYNECOLOGY

## 2019-08-28 PROCEDURE — 0298T ZIO PATCH HOLTER ADULT PEDIATRIC GREATER THAN 48 HRS: CPT | Performed by: INTERNAL MEDICINE

## 2019-08-28 PROCEDURE — 0296T ZIO PATCH HOLTER ADULT PEDIATRIC GREATER THAN 48 HRS: CPT

## 2019-08-28 RX ORDER — AMOXICILLIN 250 MG
1 CAPSULE ORAL 2 TIMES DAILY PRN
Qty: 60 TABLET | Refills: 1 | Status: SHIPPED | OUTPATIENT
Start: 2019-08-28 | End: 2022-05-18

## 2019-08-28 RX ORDER — ACETAMINOPHEN 325 MG/1
650 TABLET ORAL EVERY 4 HOURS PRN
Qty: 90 TABLET | Refills: 1 | Status: SHIPPED | OUTPATIENT
Start: 2019-08-28 | End: 2020-11-10

## 2019-08-28 RX ORDER — IBUPROFEN 800 MG/1
800 TABLET, FILM COATED ORAL EVERY 6 HOURS PRN
Qty: 90 TABLET | Refills: 1 | Status: SHIPPED | OUTPATIENT
Start: 2019-08-28 | End: 2020-11-10

## 2019-08-28 RX ORDER — OXYCODONE HYDROCHLORIDE 5 MG/1
5-10 TABLET ORAL
Qty: 20 TABLET | Refills: 0 | Status: SHIPPED | OUTPATIENT
Start: 2019-08-28 | End: 2019-09-25

## 2019-08-28 RX ADMIN — ACETAMINOPHEN 650 MG: 325 TABLET ORAL at 05:00

## 2019-08-28 RX ADMIN — IBUPROFEN 800 MG: 400 TABLET ORAL at 11:01

## 2019-08-28 RX ADMIN — SENNOSIDES AND DOCUSATE SODIUM 1 TABLET: 8.6; 5 TABLET ORAL at 11:02

## 2019-08-28 RX ADMIN — IBUPROFEN 800 MG: 400 TABLET ORAL at 05:00

## 2019-08-28 RX ADMIN — ACETAMINOPHEN 650 MG: 325 TABLET ORAL at 11:02

## 2019-08-28 NOTE — PROGRESS NOTES
[facility]    Obstetrics Post-Op / Progress Note    Assessment & Plan   Assessment:  -5 Days Post-Op  Procedure(s):  CYSTOSCOPY  REPAIR, BLADDER  EXPLORATORY LAPAROTOMY WITH REPEAT EXPLORATORY LAPAROTOMY WITH BLADDER REPAIR  CYSTOSCOPY WITH BILATERAL RETROGRADES/ BILATERAL STENT PLACEMENT AND REMOVAL/ CYSTOGRAM/ BLADDER REPAIR  Cystoscopy, cystogram, combined  Primary  section      Doing well.  Clean wound without signs of infection.  No excessive bleeding --hgb stable at 9.0g/dL after blood transfusion  Pain well-controlled.    Plan:  Ambulation encouraged  Lactation consultation  Monitor wound for signs of infection  Pain control measures as needed  Reportable signs and symptoms dicussed with the patient  Discharge later today --will follow up with me in 6 weeks  Will discharge to home with mcmahon catheter per urology recommendations; teaching completed.  Will have cardiac monitoring started today after discharge --follow up with cardiology NP on     Natalee Farris     Interval History   Doing well.  Pain is well-controlled with oral meds.  Did not need oxycodone at all yesterday.  No fevers.  No history of wound drainage, warmth or significant erythema.  Good appetite -no n/v.  +flatus.  Denies chest pain, shortness of breath, nausea or vomiting.  Ambulatory--getting around much more easiliy.  Breastfeeding well.      Medications     - MEDICATION INSTRUCTIONS -       NO Rho (D) immune globulin (RhoGam) needed - mother Rh POSITIVE       - MEDICATION INSTRUCTIONS -       oxytocin in 0.9% NaCl Stopped (19 2332)     oxytocin in 0.9% NaCl         iron sucrose (VENOFER) intermittent infusion (200 mg)  200 mg Intravenous Q24H     sodium chloride (PF)  3 mL Intracatheter Q8H       Physical Exam   Temp: 98.7  F (37.1  C) Temp src: Oral BP: 104/72   Heart Rate: 81 Resp: 16        Vitals:    19 0200   Weight: 72.6 kg (160 lb)     Vital Signs with Ranges  Temp:  [97.8  F (36.6   C)-98.7  F (37.1  C)] 98.7  F (37.1  C)  Heart Rate:  [77-93] 81  Resp:  [16-18] 16  BP: (103-119)/(72-87) 104/72  I/O last 3 completed shifts:  In: -   Out: 4000 [Urine:4000]    Uterine fundus is firm, non-tender and at the level of the umbilicus  Incision C/D/I  Extremities Non-tender; 2+ BLE    Data   Recent Labs   Lab Test 08/23/19  0352   ABO A   RH Pos   AS Neg     Recent Labs   Lab Test 08/27/19  0749 08/26/19  0830   HGB 9.0* 8.0*     Recent Labs   Lab Test 01/29/19  1445   RUQIGG 22

## 2019-08-28 NOTE — PLAN OF CARE
Nurse did leg bag teaching with patient and she was able to do return demonstration.  Her biggest concern is infection and nurse and patient discussed ways to avoid any infection.

## 2019-08-28 NOTE — CONSULTS
Care Transition Initial Assessment - RN        Met with: Patient and Family, spouse  DATA   Active Problems:    Indication for care in labor or delivery    Onset (spontaneous) of labor after 37 completed weeks of gestation but before 39 completed weeks gestation, with delivery by (planned)  section    Status post  delivery    Acute blood loss anemia       Cognitive Status: awake, alert and oriented.        Contact information and PCP information verified: Yes  Lives With: spouse      Insurance concerns: No Insurance issues identified   ASSESSMENT  Patient currently receives the following services:  none        Identified issues/concerns regarding health management: home with mcmahon catheter, reinforce teachings  Patient has the support of her spouse, no transportation issues.   PLAN  Financial costs for the patient include per insurance benefits .  Patient given options and choices for discharge yes, no preference, ok with [place].  .  Patient/family is agreeable to the plan?  Yes:   Patient anticipates discharging to home with [facility] services one for maternal  assessments and separate order for patient due to mcmahon catheter needs. I called and spoke to Chad at [facility] for maternal health at [phone] regarding the routine maternal and  home care assessment visit.  The additional needs of patient for her mcmahon catheter cares he felt fell out of the scope or intent of this visit and requested a separate [facility] RN order for patient. This would allow for additonal visists if needed. I called and discussed with Dr. Farris and received a separate order for Home RN services for patient regarding the mcmahon catheter. Chad will watch for the EPIC orders and the orders that are sent by the unit for the maternal and  assessment.     Unit Charge RN updated on process to complete the routine orders for the home maternal and  assessment , bedside RN and  patient/spouse updated on plan.     Updated patient and spouse that the cardiac monitor will be applied in the hospital as previously arranged. The appointment for the ziopatch has been cancelled.        Patient anticipates needs for home equipment: Yes, mcmahon catheter supplies  Plan/Disposition: Home   Appointments: see AVS, urology appointments set up       Care  (CTS) will continue to follow as needed.

## 2019-08-28 NOTE — DISCHARGE SUMMARY
Admit Date:     2019   Discharge Date:     2019      Debbi is a 33-year-old  who was admitted to the Postpartum Service following primary  section.  Debbi's hospital course was fairly complicated for a number of different reasons.  Upon presentation for labor evaluation, Debbi was found to be in atrial fibrillation.  Rapid response was called; at which time, irregular rate was noted and Cardiology was consulted and started Debbi on a diltiazem drip.  Debbi labored easily throughout the day and made excellent progress.  She spontaneously converted out of atrial fibrillation at approximately 3:00 p.m. and diltiazem drip was discontinued.  She remained in normal sinus rhythm with intermittent tachycardia throughout the remainder of her hospital stay.  Debbi underwent an unscheduled primary  section in the evening hours of  due to failure to descend as well as fetal intolerance of labor.  Debbi delivered a healthy male infant with a weight of 8 pounds 3 ounces and Apgars of 8 and 9 at 1 and 5 minutes, respectively.  With movement to the PACU, Debbi was found to have hunter hematuria within her Medina catheter and concern arose as to the possibility of bladder injury.  Debbi underwent a second surgery with myself as well as Dr. Anjel Mireles of Urology with cystoscopy as well as exploratory laparotomy with repair of bladder injury.  Debbi had significant blood loss during that procedure and received 4 units of red blood cells as well as FFP, albumin and calcium replacement.  Debbi was admitted to the ICU for immediate postpartum recovery due to acute hypovolemia and fluid replacement.  Debbi did well in the ICU overnight and was eventually transitioned to the postpartum floor on postoperative day #1.  Debbi's postoperative course was overall otherwise uncomplicated.  Pain was well controlled using a combination of IV as well as oral medications.  Debbi was allowed to advance her diet  with return of bowel function and had intermittent distention, but eventually tolerated regular diet prior to discharge home.  Dimple had a Medina catheter in place due to extensive bladder injury and was followed by Urology throughout her postoperative hospital stay.  Dimple was discharged to home with Medina catheter in place and will have bladder reevaluated with Dr. Mireles after 2 weeks' time.  Dimple showed no signs or symptoms of infection throughout postoperative hospital stay.  She was found to be mildly tachycardic as well as intermittently hypotensive and with slowly trending down hemoglobin to a beverly of 8.0 grams per deciliter, was given 1 additional unit of packed red blood cells with excellent response.  Prior to discharge home, Dimple's hemoglobin was 9.0 grams per deciliter and she was completely asymptomatic.  She also received 2 doses of IV iron while in the hospital.  Dimple was followed by Cardiology due to her initial atrial fibrillation and plan was for Zio Patch upon discharge home with followup at the Cardiology Clinic in 1 week's time.  Both Dimple and infant were doing very well upon discharge to home and were discharged to home on postoperative day #5 after meeting all requirements for discharge.  iDmple verbalized understanding of the discharge instructions and was given a list of contact numbers should any issues or problems.  Dimple was reminded of followup cardiology appointment on Thursday, , as well as followup bladder cystogram on  as well as visit with Dr. Mireles on Monday, .  We will follow up with Dimple closely for any other general concerns or issues.  She was discharged to home with prescriptions for oxycodone, Tylenol, ibuprofen as well as stool softener.         MINH WEST MD             D: 2019   T: 2019   MT: FORREST      Name:     DIMPLE COCHRAN   MRN:      [id number]        Account:        [id number]   :      1985           Admit Date:      08/23/2019                                  Discharge Date: 08/28/2019      Document: [id number]       cc: Marixa Farris MD

## 2019-08-28 NOTE — PLAN OF CARE
VSS. Medina in place, draining glenn urine. States minimal pain, controlled with tylenol and ibuprofen. Steri strips intact to incision and kristy site. Breastfeeding infant and pumping occasionally. Milk is in. Passing gas, ambulating in hallway. Will have holter monitor placed this afternoon and then will discharge home with catheter. Home care ordered for catheter care as well for mom/baby visit.

## 2019-08-28 NOTE — LACTATION NOTE
Routine visit with Braeden Werner  And madelaine Simons.  Madelaine Simons cluster fed all night.   Debbi feels tender for both nipples, more on the left nipple, left nipple intact.  Sore nipple shells and Lanolin given.  Debbi pumping occasionally and Braeden feeding EBM with finger and syringe, Medina cup given .  Getting ready for discharge.  Plan: Watch for feeding cues and feed every 2-3 hours and/or on demand. Continue to use feeding log to track intake and appropriate voids and stools. Take feeding log to first follow up appointment or weight check. Encourage skin to skin to promote frequent feedings, thermoregulation and bonding. Follow-up with healthcare provider or lactation consultant for questions or concerns.    No further questions at this time. Plans to follow up with Izabella martin and has a breast pump for home.    Will follow as needed.   Sandra MENEZESN, RN, PHN, RNC-MNN, IBCLC

## 2019-08-29 ENCOUNTER — TELEPHONE (OUTPATIENT)
Dept: CARDIOLOGY | Facility: CLINIC | Age: 34
End: 2019-08-29

## 2019-08-29 ENCOUNTER — TELEPHONE (OUTPATIENT)
Dept: OBGYN | Facility: CLINIC | Age: 34
End: 2019-08-29

## 2019-08-29 NOTE — TELEPHONE ENCOUNTER
Unsure of why she would be concerned about an infection to her heart?  Agree with recommendations.  Watching for redness, warmth, pain, enlarging site, etc.  Could have a little clot from the longer term IV --not concerned if no other symptoms

## 2019-08-29 NOTE — TELEPHONE ENCOUNTER
Patient just delivered and is calling today to ask about a possible reaction she is having from one of her IV's. Her arm is killing her. Please call her back on her cell phone today.

## 2019-08-29 NOTE — TELEPHONE ENCOUNTER
Patient was evaluated by cardiology while inpatient for new onset of A. Fib with RVR during active labor. Converted to NSR with IV Diltiazem. Called patient to discuss any post hospital d/c questions she may have, review medication changes, and confirm f/u appts. Patient denied any questions regarding new medications or changes to PTA medications. Patient denied any SOB, chest pain, or light headedness. RN confirmed with patient that she has an apt scheduled on 9/5/19 with ABDI Kristi Connors-phone call transferred to scheduling to extend f/u OV out to week of 9/16/19. Discharged wearing a 48 hr Zio Patch monitor. Patient advised to call clinic with any cardiac related questions or concerns prior to this abdi't. Patient verbalized understanding and agreed with plan. HEMANT Guan RN.

## 2019-08-29 NOTE — PLAN OF CARE
Pt discharged to home. Zio patch placed by ekg tech prior to discharge. Pt discharged with mcmahon catheter, states good understanding of catheter care. Scripts filled and sent with pt.

## 2019-08-29 NOTE — TELEPHONE ENCOUNTER
Left detailed message on pt's voicemail. Included call back number. Pt returned call. Read message to pt. Pt states site is warm and did appear to grow. Will try the ice and heat and monitor. Will call back tomorrow if has concerns.   Thought that infection to heart was the worse case concern

## 2019-08-29 NOTE — TELEPHONE ENCOUNTER
"C/S 8/23/19  Returned pt call  D/C'd yesterday  Had IV in the whole time-6 days  Old IV site on Left Forearm-not red or streaking, feels hard and very tender \"knot\" at the site.  Skin is warm to touch.  Applying heat. Taking Ibuprofen/Tylenol for post op discomfort.  No fever.    Encouraged alternating heat and ice (for swelling). Watch for sx's of infection.  Will route to Dr Farris to advise further as pt is now concerned about infection \"to her heart\" per a friend.    Call pt with response.  "

## 2019-08-30 ENCOUNTER — TELEPHONE (OUTPATIENT)
Dept: LAB | Facility: CLINIC | Age: 34
End: 2019-08-30

## 2019-08-30 ENCOUNTER — TELEPHONE (OUTPATIENT)
Dept: OBGYN | Facility: CLINIC | Age: 34
End: 2019-08-30

## 2019-08-30 NOTE — TELEPHONE ENCOUNTER
Genie from  home care calling - phone number [phone]    Needs orders for 1 time a week for 3 weeks and 2 as needed

## 2019-08-30 NOTE — TELEPHONE ENCOUNTER
Patient's homecare RN called to advise that patient's zio patch monitor fell off last night, but will be reapplied this morning. RN advised [facility] home care RN that we will make note of this in the chart.

## 2019-09-01 ENCOUNTER — NURSE TRIAGE (OUTPATIENT)
Dept: NURSING | Facility: CLINIC | Age: 34
End: 2019-09-01

## 2019-09-01 NOTE — TELEPHONE ENCOUNTER
"Patient calling, s/p  on . She is concerned because one side of her incision, approximately 1/2 inch long is \"opening up\" and is leaking yellowish-orange-pink, clear fluid. Enough to soak through her clothing. She does not have a dressing on, but it has soaked through the steri-strips and clothing. She denies pain or fever. Says the incision is a a bit reddened. Advised that I will page the on call for Dr. Farris(Dr. Farris is on call) to call her back directly at 12:16p at [phone].  Tara Toure RN  [facility]    Reason for Disposition    [1] Caller has URGENT question AND [2] triager unable to answer question    Additional Information    Negative: Sounds like a life-threatening emergency to the triager    Negative: Chest pain    Negative: Difficulty breathing    Negative: Abdominal pain (not incision pain) is main symptom    Negative: Surgical incision symptoms and questions    Negative: Discomfort (pain, burning or stinging) when passing urine    Negative: Constipation    Negative: Calf pain    Negative: Leg swelling    Negative: New blurred vision or vision changes    Negative: Headache    Negative: Medication question    Negative: [1] Widespread rash AND [2] bright red, sunburn-like    Negative: Fever > 100.4 F (38.0 C)    Negative: [1] SEVERE pain AND [2] not relieved by pain medications    Negative: [1] SEVERE headache AND [2] not relieved with pain medications (e.g., acetaminophen/Tylenol)    Negative: [1] Vomiting AND [2] persists > 4 hours    Negative: [1] Vomiting AND [2] abdomen is getting more swollen    Negative: [1] Drinking very little AND [2] dehydration suspected (e.g., no urine > 12 hours, very dry mouth, very lightheaded)    Negative: Patient sounds very sick or weak to the triager    Negative: Foul smelling vaginal discharge (i.e., lochia)    Answer Assessment - Initial Assessment Questions  1. SYMPTOM: \"What's the main symptom you're concerned about?\" (e.g., " "pain, fever, vomiting)      Wound opening up about 1/2 inch, draining yellow-orange-pink fluid, clear.  2. ONSET: \"When did drainage  start?\"      today  3. DATE of : \"When was  performed?\"       19  4. ANESTHESIA: \"What type of anesthesia did you have? (e.g., general, spinal, epidural, local)      epidural  5. PAIN: \"Is there any pain?\" If so, ask: \"How bad is it?\"  (Scale 1-10; or mild, moderate, severe)      no  6. FEVER: \"Do you have a fever?\" If so, ask: \"What is your temperature, how was it measured, and when did it start?\"      no  7. VOMITING: \"Is there any vomiting?\" If yes, ask: \"How many times?\"      no  8. BLEEDING: \"Is there any bleeding?\" If so, ask: \"How much?\" and \"Where?\"      Pink fluid  9. OTHER SYMPTOMS: \"Do you have any other symptoms?\" (e.g., drainage from wound, painful urination, constipation)      Wound is opening a bit    Protocols used: POSTPARTUM -  SYMPTOMS-A-AH      "

## 2019-09-02 ENCOUNTER — TELEPHONE (OUTPATIENT)
Dept: UROLOGY | Facility: CLINIC | Age: 34
End: 2019-09-02

## 2019-09-02 ENCOUNTER — NURSE TRIAGE (OUTPATIENT)
Dept: NURSING | Facility: CLINIC | Age: 34
End: 2019-09-02

## 2019-09-02 NOTE — TELEPHONE ENCOUNTER
"\"I had a  then bladder surgery due to complications and I have a catheter in\".  Caller is reporting that her urine started to get darker yesterday and now it's pure blood.    Reason for Disposition    Patient sounds very sick or weak to the triager    Additional Information    Negative: Sounds like a life-threatening emergency to the triager    Negative: Chest pain    Negative: Difficulty breathing    Negative: Abdominal pain (not incision pain) is main symptom    Negative: Surgical incision symptoms and questions    Negative: Shock suspected (e.g., cold/pale/clammy skin, too weak to stand, low BP, rapid pulse)    Negative: Sounds like a life-threatening emergency to the triager    Negative: [1] Catheter was accidentally pulled-out AND [2] bright red continuous bleeding    Negative: SEVERE abdominal pain    Negative: Fever > 100.5 F (38.1 C)    Negative: [1] Drinking very little AND [2] dehydration suspected (e.g., no urine > 12 hours, very dry mouth, very lightheaded)    Protocols used: URINARY CATHETER SYMPTOMS AND [id number]-L-YN, POSTPARTUM -  SYMPTOMS-A-AH    Chantale Neri RN  [facility]      "

## 2019-09-02 NOTE — TELEPHONE ENCOUNTER
Telephone Encounter    Date: 3:30 PM; 2019  Patient Name: Debbi Grossman  MRN: [id number]    Debbi Grossman is 33 year old year old female who underwent a  delivery on 2019, complicated by bladder dome injury requiring OR takeback, repair of bladder, and several units of PRBCs. She was discharged on 19 with a mcmahon catheter in place with outpatient follow-up for a cystogram 2019. At the time of discharge her urine was intermittently bloody.     She called today reporting a single episode of red urine while taking a long walk. Her and her  were walking around the neighborhood and she noticed the urine from her catheter turned bright red, but immediately back to clear/yellow shortly after.The catheter is draining well. She denies fevers or chills. She has baseline incisional pain at the site of her .    I provided patient reassurance that the episode of bloody urine was likely 2/2 increased activity and it is encouraging that it cleared almost immediately. We discussed return criteria, including (but not limited to):   fevers > 101.4, chills, an inability to keep food or fluids down, increasing hematuria, and catheter not draining.    The patient has an appointment with her Ob/Gyn tomorrow morning.    - Patient expressed understanding and was in agreement with plan.    Kathryn Guzman MD  PGY-2 Urology  Pager [phone]

## 2019-09-03 ENCOUNTER — OFFICE VISIT (OUTPATIENT)
Dept: OBGYN | Facility: CLINIC | Age: 34
End: 2019-09-03
Payer: COMMERCIAL

## 2019-09-03 VITALS
BODY MASS INDEX: 20.32 KG/M2 | HEART RATE: 64 BPM | WEIGHT: 139.6 LBS | DIASTOLIC BLOOD PRESSURE: 58 MMHG | SYSTOLIC BLOOD PRESSURE: 90 MMHG

## 2019-09-03 DIAGNOSIS — R82.90 CLOUDY URINE: ICD-10-CM

## 2019-09-03 LAB
ALBUMIN UR-MCNC: ABNORMAL MG/DL
APPEARANCE UR: CLEAR
BACTERIA #/AREA URNS HPF: ABNORMAL /HPF
BILIRUB UR QL STRIP: NEGATIVE
COLOR UR AUTO: YELLOW
GLUCOSE UR STRIP-MCNC: NEGATIVE MG/DL
HGB UR QL STRIP: ABNORMAL
KETONES UR STRIP-MCNC: NEGATIVE MG/DL
LEUKOCYTE ESTERASE UR QL STRIP: ABNORMAL
NITRATE UR QL: POSITIVE
PH UR STRIP: 5 PH (ref 5–7)
RBC #/AREA URNS AUTO: ABNORMAL /HPF
SOURCE: ABNORMAL
SP GR UR STRIP: 1.02 (ref 1–1.03)
UROBILINOGEN UR STRIP-ACNC: 0.2 EU/DL (ref 0.2–1)
WBC #/AREA URNS AUTO: >100 /HPF

## 2019-09-03 PROCEDURE — 87186 SC STD MICRODIL/AGAR DIL: CPT | Performed by: OBSTETRICS & GYNECOLOGY

## 2019-09-03 PROCEDURE — 87088 URINE BACTERIA CULTURE: CPT | Performed by: OBSTETRICS & GYNECOLOGY

## 2019-09-03 PROCEDURE — 87086 URINE CULTURE/COLONY COUNT: CPT | Performed by: OBSTETRICS & GYNECOLOGY

## 2019-09-03 PROCEDURE — 99024 POSTOP FOLLOW-UP VISIT: CPT | Performed by: OBSTETRICS & GYNECOLOGY

## 2019-09-03 PROCEDURE — 81001 URINALYSIS AUTO W/SCOPE: CPT | Performed by: OBSTETRICS & GYNECOLOGY

## 2019-09-03 RX ORDER — NITROFURANTOIN 25; 75 MG/1; MG/1
100 CAPSULE ORAL 2 TIMES DAILY
Qty: 14 CAPSULE | Refills: 0 | Status: SHIPPED | OUTPATIENT
Start: 2019-09-03 | End: 2019-09-05

## 2019-09-03 NOTE — PROGRESS NOTES
"  SUBJECTIVE:                                                   Debbi Grossman is a 33 year old female who presents to clinic today for the following health issue(s):  Patient presents with:  Follow Up      HPI:  Here today for incision check --called in over the weekend with watery/blood tinged discharge from left side of incision.  Had noted a gush of fluid and a small 1/2\" opening in her incision.  No redness.  No purulence or foul smelling discharge.  No pain or discomfort.  Reinforced incision with steri strips and used ABD pad for absorption of more fluid.  Has since stopped draining  S/p primary c/s with bladder injury on 19 --has mcmahon catheter in place x 2-3wks until follow up cystogram and visit with urology.  Noted some blood again in her catheter after a longer walk yesterday --has since cleared.  Has also noted her urine to be more cloudy in the last 24hrs.  Some more \"awareness\" of her catheter almost like \"an early UTI\".  No pain or discomfort.   No fever/chills.  No flank or back pain.    Patient's last menstrual period was 2018..   Patient is not sexually active, .  Using none for contraception.    reports that she has never smoked. She has never used smokeless tobacco.    STD testing offered?  Declined    Health maintenance updated:  yes    Today's PHQ-2 Score:   PHQ-2 (  [employer]) 2016   Q1: Little interest or pleasure in doing things 0   Q2: Feeling down, depressed or hopeless 0   PHQ-2 Score 0     Today's PHQ-9 Score:   PHQ-9 SCORE 2019   PHQ-9 Total Score 0     Today's ANDRZEJ-7 Score:   ANDRZEJ-7 SCORE 2019   Total Score 1       Problem list and histories reviewed & adjusted, as indicated.  Additional history: as documented.    Patient Active Problem List   Diagnosis     Scoliosis     Inflammatory acne     Comedonal acne     Chronic nasal congestion     Acute seasonal allergic rhinitis, unspecified trigger     Supervision of normal IUP (intrauterine pregnancy) in " primigravida     Indication for care in labor or delivery     Onset (spontaneous) of labor after 37 completed weeks of gestation but before 39 completed weeks gestation, with delivery by (planned)  section     Status post  delivery     Acute blood loss anemia     Past Surgical History:   Procedure Laterality Date      SECTION N/A 2019    Procedure:  SECTION;  Surgeon: Natalee Farris MD;  Location:  [facility]     CYSTOSCOPY N/A 2019    Procedure: CYSTOSCOPY;  Surgeon: Natalee Farris MD;  Location: [facility] OR     CYSTOSCOPY, CYSTOGRAM, COMBINED N/A 2019    Procedure: Cystoscopy, cystogram, combined;  Surgeon: Pedro Luis Mireles MD;  Location:  OR     CYSTOSCOPY, RETROGRADES, INSERT STENT URETER(S), COMBINED  2019    Procedure: CYSTOSCOPY WITH BILATERAL RETROGRADES/ BILATERAL STENT PLACEMENT AND REMOVAL/ CYSTOGRAM/ BLADDER REPAIR;  Surgeon: Pedro Luis Mireles MD;  Location:  OR     HC REMOVE TONSILS/ADENOIDS,<11 Y/O       HC TOOTH EXTRACTION W/FORCEP      [place] Teeth     LAPAROTOMY EXPLORATORY N/A 2019    Procedure: EXPLORATORY LAPAROTOMY WITH REPEAT EXPLORATORY LAPAROTOMY WITH BLADDER REPAIR;  Surgeon: Natalee Farris MD;  Location:  OR     REPAIR BLADDER N/A 2019    Procedure: REPAIR, BLADDER;  Surgeon: Natalee Farris MD;  Location:  OR      Social History     Tobacco Use     Smoking status: Never Smoker     Smokeless tobacco: Never Used   Substance Use Topics     Alcohol use: No     Frequency: Never     Comment: Less than once a month      Problem (# of Occurrences) Relation (Name,Age of Onset)    Cancer - colorectal (1) Maternal Grandfather    Depression (1) Brother: bipolar depression    Diabetes (2) Father, Maternal Grandfather    Hypertension (1) Father    Neurologic Disorder (2) Brother: epliepsy, Sister    Prostate Cancer (1) Maternal Grandfather    Psychotic Disorder (1) Maternal Grandmother            Current  Outpatient Medications   Medication Sig     acetaminophen (TYLENOL) 325 MG tablet Take 2 tablets (650 mg) by mouth every 4 hours as needed for other (multimodal surgical pain management along with NSAIDS and opioid medication as indicated based on pain control and physical function.)     adapalene (DIFFERIN) 0.1 % gel Apply  topically At Bedtime.Need annual office visit     benzoyl peroxide-erythromycin (BENZAMYCIN) topical gel Apply twice daily     Docosahexaenoic Acid (DHA PO)      ibuprofen (ADVIL/MOTRIN) 800 MG tablet Take 1 tablet (800 mg) by mouth every 6 hours as needed for other (cramping)     nitroFURantoin macrocrystal-monohydrate (MACROBID) 100 MG capsule Take 1 capsule (100 mg) by mouth 2 times daily     Prenatal Multivit-Min-Fe-FA (PRENATAL VITAMINS PO) Take 1 tablet by mouth     senna-docusate (SENOKOT-S/PERICOLACE) 8.6-50 MG tablet Take 1 tablet by mouth 2 times daily as needed for constipation     mometasone (NASONEX) 50 MCG/ACT spray Spray 2 sprays into both nostrils daily (Patient not taking: Reported on 9/3/2019)     oxyCODONE (ROXICODONE) 5 MG tablet Take 1-2 tablets (5-10 mg) by mouth every 3 hours as needed for moderate to severe pain (Patient not taking: Reported on 9/3/2019)     No current facility-administered medications for this visit.      Allergies   Allergen Reactions     Seasonal Allergies        ROS:  12 point review of systems negative other than symptoms noted below.    OBJECTIVE:     BP 90/58 (BP Location: Right arm, Patient Position: Sitting, Cuff Size: Adult Regular)   Pulse 64   Wt 63.3 kg (139 lb 9.6 oz)   LMP 11/16/2018   Breastfeeding? Yes   BMI 20.32 kg/m    Body mass index is 20.32 kg/m .    Exam:  Constitutional:  Appearance: Well nourished, well developed alert, in no acute distress  Gastrointestinal:  Abdominal Examination:  Abdomen nontender to palpation, tone normal without rigidity or guarding, no masses present, umbilicus without lesions; Liver/Spleen:  No  hepatomegaly present, liver nontender to palpation; Hernias:  No hernias present  Incision: healing well; steri strips removed and incision intact; very small superficial separation noted on left side of incision.  Wound gently probed with swab --cannot open incision.  No drainage expressed.  Remainder of incision well healed and approximated.  No redness/fullness/tenderness.  Steri strips placed only on this 1/2 cm portion of incision  Skin:General Inspection:  No rashes present, no lesions present, no areas of discoloration; Genitalia and Groin:  No rashes present, no lesions present, no areas of discoloration, no masses present.  Neurologic/Psychiatric:  Mental Status:  Oriented X3      In-Clinic Test Results:  Results for orders placed or performed in visit on 19 (from the past 24 hour(s))   UA with Microscopic   Result Value Ref Range    Color Urine Yellow     Appearance Urine Clear     Glucose Urine Negative NEG^Negative mg/dL    Bilirubin Urine Negative NEG^Negative    Ketones Urine Negative NEG^Negative mg/dL    Specific Gravity Urine 1.020 1.003 - 1.035    pH Urine 5.0 5.0 - 7.0 pH    Protein Albumin Urine 2+ (A) NEG^Negative mg/dL    Urobilinogen Urine 0.2 0.2 - 1.0 EU/dL    Nitrite Urine Positive (A) NEG^Negative    Blood Urine 2+ (A) NEG^Negative    Leukocyte Esterase Urine 2+ (A) NEG^Negative    Source Catheterized Urine     WBC Urine >100 (A) OTO5^0 - 5 /HPF    RBC Urine 25-50 (A) OTO2^O - 2 /HPF    Bacteria Urine Few (A) NEG^Negative /HPF       ASSESSMENT/PLAN:                                                        ICD-10-CM    1.  section wound seroma, postpartum O90.89    2. Cloudy urine R82.90 Urine Culture Aerobic Bacterial     UA with Microscopic     nitroFURantoin macrocrystal-monohydrate (MACROBID) 100 MG capsule     CANCELED: UA with Microscopic       Patient Instructions   Incision inspected and found to be intact; no redness or sign of infection.  Original steri strips placed  and new ones placed to reinforce area of leaking.  Healing normally  Will check UA/UCx for new cloudiness of urine in mcmahon bag.      Natalee Farris MD  [facility] [place]

## 2019-09-03 NOTE — PATIENT INSTRUCTIONS
Incision inspected and found to be intact; no redness or sign of infection.  Original steri strips placed and new ones placed to reinforce area of leaking.  Healing normally  Will check UA/UCx for new cloudiness of urine in mcmahon bag.

## 2019-09-04 LAB
BACTERIA SPEC CULT: ABNORMAL
Lab: ABNORMAL
SPECIMEN SOURCE: ABNORMAL

## 2019-09-05 ENCOUNTER — TELEPHONE (OUTPATIENT)
Dept: OBGYN | Facility: CLINIC | Age: 34
End: 2019-09-05

## 2019-09-05 ENCOUNTER — PRE VISIT (OUTPATIENT)
Dept: UROLOGY | Facility: CLINIC | Age: 34
End: 2019-09-05

## 2019-09-05 DIAGNOSIS — R82.90 CLOUDY URINE: ICD-10-CM

## 2019-09-05 RX ORDER — NITROFURANTOIN 25; 75 MG/1; MG/1
100 CAPSULE ORAL 2 TIMES DAILY
Qty: 14 CAPSULE | Refills: 0 | Status: SHIPPED | OUTPATIENT
Start: 2019-09-05 | End: 2019-09-20

## 2019-09-05 NOTE — TELEPHONE ENCOUNTER
Reason for Visit: Cystogram follow up    Orders/Procedures/Records: Records available    Contact Patient: N/A    Rooming Requirements: Normal      Chana Covarrubias  09/05/19  1:59 PM

## 2019-09-05 NOTE — TELEPHONE ENCOUNTER
Type of Paperwork received:  FMLA     Date Rcvd:  9/5/19    Rcvd From (Company name): [employer]    Provider:  JENNA Rendon on Provider Cart Date:  9/5/19

## 2019-09-05 NOTE — RESULT ENCOUNTER NOTE
Please inform Debbi that she did indeed have a UTI with the most common bacteria; she is on the appropriate antibiotic and I would like her to continue this antibiotic x 2 weeks or until her catheter comes out.  Hope she is feeling better!

## 2019-09-09 ENCOUNTER — TELEPHONE (OUTPATIENT)
Dept: OBGYN | Facility: CLINIC | Age: 34
End: 2019-09-09

## 2019-09-09 NOTE — TELEPHONE ENCOUNTER
Pt calling in with concerns regarding her c/s incision.   She delivered on Aug 23, 2019  She has noticed that the incision is becoming more red and hard. She says it approx 1/2 in above and below incision that is red.   Warmer to touch. Denies any current drainage or odor. Pt said she had clear drainage from incision last week.  Denies fever. It is more tender for her to move around due to the new discomfort from the incision.  Advised the the pt It would be good for her to see the dr to assess the incision . Pt made appt to be seen tomorrow. Instructed the pt to watch for an increase of redness around the incision line/ drainage/ developing intense pain/ swelling or has fever. Pt verbalizes understanding.

## 2019-09-11 ENCOUNTER — OFFICE VISIT (OUTPATIENT)
Dept: OBGYN | Facility: CLINIC | Age: 34
End: 2019-09-11
Payer: COMMERCIAL

## 2019-09-11 VITALS — SYSTOLIC BLOOD PRESSURE: 104 MMHG | DIASTOLIC BLOOD PRESSURE: 62 MMHG | WEIGHT: 138 LBS | BODY MASS INDEX: 20.09 KG/M2

## 2019-09-11 DIAGNOSIS — Z09 POSTOPERATIVE EXAMINATION: Primary | ICD-10-CM

## 2019-09-11 LAB
ERYTHROCYTE [DISTWIDTH] IN BLOOD BY AUTOMATED COUNT: 13.5 % (ref 10–15)
HCT VFR BLD AUTO: 34.1 % (ref 35–47)
HGB BLD-MCNC: 11 G/DL (ref 11.7–15.7)
MCH RBC QN AUTO: 30.9 PG (ref 26.5–33)
MCHC RBC AUTO-ENTMCNC: 32.3 G/DL (ref 31.5–36.5)
MCV RBC AUTO: 96 FL (ref 78–100)
PLATELET # BLD AUTO: 411 10E9/L (ref 150–450)
RBC # BLD AUTO: 3.56 10E12/L (ref 3.8–5.2)
WBC # BLD AUTO: 6 10E9/L (ref 4–11)

## 2019-09-11 PROCEDURE — 99024 POSTOP FOLLOW-UP VISIT: CPT | Performed by: OBSTETRICS & GYNECOLOGY

## 2019-09-11 PROCEDURE — 85027 COMPLETE CBC AUTOMATED: CPT | Performed by: OBSTETRICS & GYNECOLOGY

## 2019-09-11 PROCEDURE — 36415 COLL VENOUS BLD VENIPUNCTURE: CPT | Performed by: OBSTETRICS & GYNECOLOGY

## 2019-09-11 NOTE — PROGRESS NOTES
SUBJECTIVE:                                                   Debbi Grossman is a 33 year old female who presents to clinic today for the following health issue(s):  Patient presents with:  Wound Check: started hurting 2 days ago, leaking pus    HPI:  Here today for incision check.  S/p primary c/s and exploratory laparotomy for bladder injury with me on 19.  Still has mcmahon catheter in place --will be having cytogram and appt with Dr. Mireles tomorrow!!  Has been on macrobid for UTI since last week.  Started to notice more incisional tenderness in the last 2 days.   Started on her right incisional angle and now sore across the incision.  Some firmness and redness along incision line.  Has been stable --no expansion in the last 2 days.  Some drainage --somewhat purulent.  Serosanguinous fluid noted on ABD today.  Keeping it covered.  No fever/chills.  No n/v.  Bowels moving daily.  Urine cloudiness cleared up shortly after starting macrobid.  Occ clot in mcmahon but most days quite clear  Feeling a bit lethargic.  Sleeping pretty well.  Good appetite.  No lightheadedness/dizziness.      Patient's last menstrual period was 2018..   Patient is sexually active, .  Using not sexually active for contraception.    reports that she has never smoked. She has never used smokeless tobacco.    STD testing offered?  Declined    Health maintenance updated:  yes    Today's PHQ-2 Score:   PHQ-2 (  [employer]) 2016   Q1: Little interest or pleasure in doing things 0   Q2: Feeling down, depressed or hopeless 0   PHQ-2 Score 0     Today's PHQ-9 Score:   PHQ-9 SCORE 2019   PHQ-9 Total Score 0     Today's ANDRZEJ-7 Score:   ANDRZEJ-7 SCORE 2019   Total Score 1       Problem list and histories reviewed & adjusted, as indicated.  Additional history: as documented.    Patient Active Problem List   Diagnosis     Scoliosis     Inflammatory acne     Comedonal acne     Chronic nasal congestion     Acute seasonal  allergic rhinitis, unspecified trigger     Supervision of normal IUP (intrauterine pregnancy) in primigravida     Indication for care in labor or delivery     Onset (spontaneous) of labor after 37 completed weeks of gestation but before 39 completed weeks gestation, with delivery by (planned)  section     Status post  delivery     Acute blood loss anemia     Past Surgical History:   Procedure Laterality Date      SECTION N/A 2019    Procedure:  SECTION;  Surgeon: Natalee Farris MD;  Location:  L+D     CYSTOSCOPY N/A 2019    Procedure: CYSTOSCOPY;  Surgeon: Natalee Farris MD;  Location:  OR     CYSTOSCOPY, CYSTOGRAM, COMBINED N/A 2019    Procedure: Cystoscopy, cystogram, combined;  Surgeon: Pedro Luis Mireles MD;  Location:  OR     CYSTOSCOPY, RETROGRADES, INSERT STENT URETER(S), COMBINED  2019    Procedure: CYSTOSCOPY WITH BILATERAL RETROGRADES/ BILATERAL STENT PLACEMENT AND REMOVAL/ CYSTOGRAM/ BLADDER REPAIR;  Surgeon: Pedro Luis Mireles MD;  Location:  OR      REMOVE TONSILS/ADENOIDS,<13 Y/O       HC TOOTH EXTRACTION W/FORCEP      [place] Teeth     LAPAROTOMY EXPLORATORY N/A 2019    Procedure: EXPLORATORY LAPAROTOMY WITH REPEAT EXPLORATORY LAPAROTOMY WITH BLADDER REPAIR;  Surgeon: Natalee Farris MD;  Location:  OR     REPAIR BLADDER N/A 2019    Procedure: REPAIR, BLADDER;  Surgeon: Natalee Farris MD;  Location:  OR      Social History     Tobacco Use     Smoking status: Never Smoker     Smokeless tobacco: Never Used   Substance Use Topics     Alcohol use: No     Frequency: Never     Comment: Less than once a month      Problem (# of Occurrences) Relation (Name,Age of Onset)    Cancer - colorectal (1) Maternal Grandfather    Depression (1) Brother: bipolar depression    Diabetes (2) Father, Maternal Grandfather    Hypertension (1) Father    Neurologic Disorder (2) Brother: epliepsy, Sister    Prostate Cancer (1)  Maternal Grandfather    Psychotic Disorder (1) Maternal Grandmother            Current Outpatient Medications   Medication Sig     acetaminophen (TYLENOL) 325 MG tablet Take 2 tablets (650 mg) by mouth every 4 hours as needed for other (multimodal surgical pain management along with NSAIDS and opioid medication as indicated based on pain control and physical function.)     Docosahexaenoic Acid (DHA PO)      ibuprofen (ADVIL/MOTRIN) 800 MG tablet Take 1 tablet (800 mg) by mouth every 6 hours as needed for other (cramping)     mometasone (NASONEX) 50 MCG/ACT spray Spray 2 sprays into both nostrils daily     nitroFURantoin macrocrystal-monohydrate (MACROBID) 100 MG capsule Take 1 capsule (100 mg) by mouth 2 times daily     Prenatal Multivit-Min-Fe-FA (PRENATAL VITAMINS PO) Take 1 tablet by mouth     senna-docusate (SENOKOT-S/PERICOLACE) 8.6-50 MG tablet Take 1 tablet by mouth 2 times daily as needed for constipation     adapalene (DIFFERIN) 0.1 % gel Apply  topically At Bedtime.Need annual office visit (Patient not taking: Reported on 9/11/2019)     benzoyl peroxide-erythromycin (BENZAMYCIN) topical gel Apply twice daily (Patient not taking: Reported on 9/11/2019)     oxyCODONE (ROXICODONE) 5 MG tablet Take 1-2 tablets (5-10 mg) by mouth every 3 hours as needed for moderate to severe pain (Patient not taking: Reported on 9/11/2019)     No current facility-administered medications for this visit.      Allergies   Allergen Reactions     Seasonal Allergies        ROS:  12 point review of systems negative other than symptoms noted below.    OBJECTIVE:     /62   Wt 62.6 kg (138 lb)   LMP 11/16/2018   Breastfeeding? Yes   BMI 20.09 kg/m    Body mass index is 20.09 kg/m .    Exam:  Constitutional:  Appearance: Well nourished, well developed alert, in no acute distress  Gastrointestinal:  Abdominal Examination:  Abdomen nontender to palpation, tone normal without rigidity or guarding, no masses present, umbilicus  without lesions; Liver/Spleen:  No hepatomegaly present, liver nontender to palpation; Hernias:  No hernias present  INCISION: MILD REDNESS/INDURATION ALONG INCISION LINE; NO FLUCTUANCE; SMALL DROPS OF SEROSANGUINOUS FLUID EXPRESSED FROM RIGHT INCISION; NO PURULENCE; NO FOUL SMELL; SMALL AREA OF ?BLOOD BLISTER ALONG INCISION LINE ~5MM; SOME DESQUAMATION NOTED BUT INCISION INTACT WITH GENTLE PROBING.  LEFT INCISION WELL HEALED  Skin:General Inspection:  No rashes present, no lesions present, no areas of discoloration; Genitalia and Groin:  No rashes present, no lesions present, no areas of discoloration, no masses present.  Neurologic/Psychiatric:  Mental Status:  Oriented X3      In-Clinic Test Results:  Results for orders placed or performed in visit on 19 (from the past 24 hour(s))   CBC with platelets   Result Value Ref Range    WBC 6.0 4.0 - 11.0 10e9/L    RBC Count 3.56 (L) 3.8 - 5.2 10e12/L    Hemoglobin 11.0 (L) 11.7 - 15.7 g/dL    Hematocrit 34.1 (L) 35.0 - 47.0 %    MCV 96 78 - 100 fl    MCH 30.9 26.5 - 33.0 pg    MCHC 32.3 31.5 - 36.5 g/dL    RDW 13.5 10.0 - 15.0 %    Platelet Count 411 150 - 450 10e9/L       ASSESSMENT/PLAN:                                                        ICD-10-CM    1. Postoperative examination Z09 CBC with platelets   2.  section wound seroma, postpartum O90.89        Patient Instructions   Reassurance given with incision check.  Mild induration but no fluctuance.  Small amount serosanguinous discharge expressed but no purulence.  Will continue to monitor this week.  Will keep covered and dry.  Will follow up with me next week for incision check.  Discussed lethargy/fatigue and common postpartum symptoms.  Hemoglobin normal today so will keep observing at this time.      Natalee Farris MD  [facility]

## 2019-09-11 NOTE — ADDENDUM NOTE
Addendum  created 09/10/19 1905 by Awais Marti MD    Intraprocedure Blocks edited, Sign clinical note

## 2019-09-11 NOTE — PATIENT INSTRUCTIONS
Reassurance given with incision check.  Mild induration but no fluctuance.  Small amount serosanguinous discharge expressed but no purulence.  Will continue to monitor this week.  Will keep covered and dry.  Will follow up with me next week for incision check.  Discussed lethargy/fatigue and common postpartum symptoms.  Hemoglobin normal today so will keep observing at this time.

## 2019-09-12 ENCOUNTER — ANCILLARY PROCEDURE (OUTPATIENT)
Dept: GENERAL RADIOLOGY | Facility: CLINIC | Age: 34
End: 2019-09-12
Attending: UROLOGY
Payer: COMMERCIAL

## 2019-09-12 ENCOUNTER — OFFICE VISIT (OUTPATIENT)
Dept: UROLOGY | Facility: CLINIC | Age: 34
End: 2019-09-12
Payer: COMMERCIAL

## 2019-09-12 VITALS
HEIGHT: 70 IN | WEIGHT: 139 LBS | SYSTOLIC BLOOD PRESSURE: 105 MMHG | BODY MASS INDEX: 19.9 KG/M2 | HEART RATE: 66 BPM | DIASTOLIC BLOOD PRESSURE: 60 MMHG

## 2019-09-12 DIAGNOSIS — Z98.890 S/P BLADDER REPAIR: ICD-10-CM

## 2019-09-12 DIAGNOSIS — Z98.890 S/P BLADDER REPAIR: Primary | ICD-10-CM

## 2019-09-12 LAB
ALBUMIN UR-MCNC: NEGATIVE MG/DL
APPEARANCE UR: CLEAR
BILIRUB UR QL STRIP: NEGATIVE
COLOR UR AUTO: ABNORMAL
GLUCOSE UR STRIP-MCNC: NEGATIVE MG/DL
HGB UR QL STRIP: ABNORMAL
KETONES UR STRIP-MCNC: NEGATIVE MG/DL
LEUKOCYTE ESTERASE UR QL STRIP: NEGATIVE
NITRATE UR QL: NEGATIVE
PH UR STRIP: 6 PH (ref 5–7)
RBC #/AREA URNS AUTO: 2 /HPF (ref 0–2)
SOURCE: ABNORMAL
SP GR UR STRIP: 1 (ref 1–1.03)
UROBILINOGEN UR STRIP-MCNC: 0 MG/DL (ref 0–2)
WBC #/AREA URNS AUTO: <1 /HPF (ref 0–5)

## 2019-09-12 ASSESSMENT — PAIN SCALES - GENERAL: PAINLEVEL: NO PAIN (0)

## 2019-09-12 ASSESSMENT — MIFFLIN-ST. JEOR: SCORE: 1407.81

## 2019-09-12 NOTE — NURSING NOTE
Debbi Grossman comes into clinic today at the request of Referred Self for TOV / catheter removal.    This service provided today was under the direct supervision of Dr. Mireles, who was available if needed.    Debbi Grossman presented today for a trial of void.  Approximately 150 mL of normal saline instilled into bladder via catheter.  Patient stated she had urge to urinate and catheter was removed without difficulty.  Patient was given a toilet hat to measure urine output.  Patient voided approximately 210 mL of yellow urine.  PVR 0 mL.    Cipro 500 given per protocol: No  Patient is currently on Macrobid    Patient did tolerate procedure well.    Teaching done with patient verbally as where to call or go if pain, fever, or unable to urinate post catheter removal.    Chana Covarrubias, EMT  9/12/2019  4:14 PM

## 2019-09-12 NOTE — LETTER
2019       RE: Debbi Grossman  [address]  [facility] [address]     Dear Colleague,    Thank you for referring your patient, Debbi Grossman, to the [facility] UROLOGY AND INST FOR PROSTATE AND UROLOGIC CANCERS at [facility]. Please see a copy of my visit note below.          Chief Complaint:    Bladder injury during            Consult or Referral:     Ms.. Debbi Grossman is a 33 year old female seen in consultation from Dr. Gross.         History of Present Illness:    Debbi Grossman is a very pleasant 33 year old female who presents with a history of bladder injury during .  I was called for an interoperative consult and repaired the bladder.  The dome of the bladder had been inadvertantly anastomosed to the uterus.  We took this down and repaired the bladder.  The posterior wall of the dome was very thin.    We left in a mcmahon and now today a cystogram shows no extravasation.  The bladder dome though is missing.    She had one episode of hematuria after repair, but recently has been doing well.    Baby is doing well              Past Medical History:     Past Medical History:   Diagnosis Date     Acute blood loss anemia 2019     CP (cerebral palsy) (H)      Onset (spontaneous) of labor after 37 completed weeks of gestation but before 39 completed weeks gestation, with delivery by (planned)  section 2019     Scoliosis 5/10/2012     Seasonal allergies      Status post  delivery 2019    Baby Boy-name is Kristyn            Past Surgical History:     Past Surgical History:   Procedure Laterality Date      SECTION N/A 2019    Procedure:  SECTION;  Surgeon: Natalee Farris MD;  Location:  L+D     CYSTOSCOPY N/A 2019    Procedure: CYSTOSCOPY;  Surgeon: Natalee Farris MD;  Location:  OR     CYSTOSCOPY, CYSTOGRAM, COMBINED N/A 2019    Procedure: Cystoscopy, cystogram, combined;  Surgeon:  Pedro Luis Mireles MD;  Location: [facility] OR     CYSTOSCOPY, RETROGRADES, INSERT STENT URETER(S), COMBINED  8/23/2019    Procedure: CYSTOSCOPY WITH BILATERAL RETROGRADES/ BILATERAL STENT PLACEMENT AND REMOVAL/ CYSTOGRAM/ BLADDER REPAIR;  Surgeon: Pedro Luis Mireles MD;  Location: [facility] OR     HC REMOVE TONSILS/ADENOIDS,<11 Y/O       HC TOOTH EXTRACTION W/FORCEP      [place] Teeth     LAPAROTOMY EXPLORATORY N/A 8/23/2019    Procedure: EXPLORATORY LAPAROTOMY WITH REPEAT EXPLORATORY LAPAROTOMY WITH BLADDER REPAIR;  Surgeon: Natalee Farris MD;  Location: [facility] OR     REPAIR BLADDER N/A 8/23/2019    Procedure: REPAIR, BLADDER;  Surgeon: Natalee Farris MD;  Location: [facility] OR            Medications     Current Outpatient Medications   Medication     acetaminophen (TYLENOL) 325 MG tablet     Docosahexaenoic Acid (DHA PO)     ibuprofen (ADVIL/MOTRIN) 800 MG tablet     mometasone (NASONEX) 50 MCG/ACT spray     nitroFURantoin macrocrystal-monohydrate (MACROBID) 100 MG capsule     Prenatal Multivit-Min-Fe-FA (PRENATAL VITAMINS PO)     senna-docusate (SENOKOT-S/PERICOLACE) 8.6-50 MG tablet     adapalene (DIFFERIN) 0.1 % gel     benzoyl peroxide-erythromycin (BENZAMYCIN) topical gel     oxyCODONE (ROXICODONE) 5 MG tablet     No current facility-administered medications for this visit.             Family History:     Family History   Problem Relation Age of Onset     Hypertension Father      Diabetes Father      Psychotic Disorder Maternal Grandmother      Prostate Cancer Maternal Grandfather      Cancer - colorectal Maternal Grandfather      Diabetes Maternal Grandfather      Depression Brother         bipolar depression     Neurologic Disorder Brother         epliepsy     Neurologic Disorder Sister             Social History:     Social History     Socioeconomic History     Marital status:      Spouse name: Not on file     Number of children: 0     Years of education: Not on file     Highest education level: Not  "on file   Occupational History     Employer: [employer]   Social Needs     Financial resource strain: Not on file     Food insecurity:     Worry: Not on file     Inability: Not on file     Transportation needs:     Medical: Not on file     Non-medical: Not on file   Tobacco Use     Smoking status: Never Smoker     Smokeless tobacco: Never Used   Substance and Sexual Activity     Alcohol use: No     Frequency: Never     Comment: Less than once a month     Drug use: No     Sexual activity: Not Currently     Partners: Male     Birth control/protection: None   Lifestyle     Physical activity:     Days per week: Not on file     Minutes per session: Not on file     Stress: Not on file   Relationships     Social connections:     Talks on phone: Not on file     Gets together: Not on file     Attends [employer] service: Not on file     Active member of club or organization: Not on file     Attends meetings of clubs or organizations: Not on file     Relationship status: Not on file     Intimate partner violence:     Fear of current or ex partner: Not on file     Emotionally abused: Not on file     Physically abused: Not on file     Forced sexual activity: Not on file   Other Topics Concern     Parent/sibling w/ CABG, MI or angioplasty before 65F 55M? Not Asked   Social History Narrative     Not on file            Allergies:   Seasonal allergies         Review of Systems:  From intake questionnaire     Negative 14 system review except as noted on HPI, nurse's note.         Physical Exam:     Patient is a 33 year old  female   Vitals: Blood pressure 105/60, pulse 66, height 1.765 m (5' 9.5\"), weight 63 kg (139 lb), last menstrual period 11/16/2018, currently breastfeeding.  General Appearance Adult: Alert, no acute distress, oriented  HENT: throat/mouth:normal, good dentition  Neck: No adenopathy,masses or thyromegaly  Lungs: no respiratory distress, or pursed lip breathing  Heart: No obvious jugular venous distension " present  Abdomen: soft, nontender, no organomegaly or masses, Body mass index is 20.23 kg/m ., scars noted Pfannenstiel  Lymphatics: No cervical or supraclavicular adenopathy  Musculoskeltal: extremities normal, no peripheral edema  Skin: no suspicious lesions or rashes  Neuro: Alert, oriented, speech and mentation normal  Psych: affect and mood normal  Gait: Normal  : mcmahon in place with clear urine      Labs and Pathology:    I reviewed all applicable laboratory and pathology data and went over findings with patient  Significant for   Lab Results   Component Value Date    CR 0.55 08/27/2019    CR 0.55 08/26/2019    CR 0.51 08/25/2019    CR 0.58 08/24/2019    CR 0.57 08/23/2019    CR 0.54 08/23/2019    CR 0.65 08/21/2012     No results found for: PSA          Imaging:    I reviewed all applicable imaging and went over findings with patient.  Significant for cystogram shows well healed bladder with no extravasation      Outside and Past Medical records:    I spent 10 minutes reviewing outside and past medical records.           Assessment and Plan:     Assessment:   Bladder injury, with repair and well healed      Plan:  Successful TOV today.    Bladder volume may be reduced, but hopefully will expand with time.    If she has trouble, she should contact us. We can perform UDS (urodynamics).    If hematuria, she should drink more water. If clots she should call us.    Overall, though she should do well.      Orders  Orders Placed This Encounter   Procedures     MEASURE POST-VOID RESIDUAL URINE/BLADDER CAPACITY, US NON-IMAGING     Routine UA with micro reflex to culture       F/U:   TEENA Mireles MD  Department of Urology Staff  [facility]    Note, dictation software may have been used for this note and the content was not proofread for accuracy    CC  Patient Care Team:  Marixa Roche PA-C as PCP - General (Physician Assistant)  Marixa Roche PA-C as Assigned PCP  SELF,  REFERRED    Copy to patient  DIMPLE COCHRAN  [address] [facility] [address]      Again, thank you for allowing me to participate in the care of your patient.      Sincerely,    Pedro Luis Mireles MD

## 2019-09-12 NOTE — OP NOTE
PATIENT NAME:Debbi Grossman  MEDICAL RECORD NUMBER: [id number]  SURGEON: Pedro Luis Mireles MD  Co Surgeon Dr. Natalee Farris  PREOPERATIVE DIAGNOSIS: hematuria s/p c section, intra operative consult by OB/GYN  POSTOPERATIVE DIAGNOSIS: Bladder laceration during the  with the bladder and communication with a GYN tract.  Bilateral retrogrades were normal stents were temporarily placed and removed.  PROCEDURE PERFORMED: Cystoscopy, hysteroscopy, bilateral retrograde pyelograms bilateral ureteral stent placement closure of cystotomy, clot evacuation, ex-lap  ANESTHESIA: General.   COMPLICATIONS: None.   OPERATIVE FINDINGS: Large cystotomy had been previously closed to the uterus.  Clot evacuated to organs were reclosed separately, bilateral ureter ureters were intact with no sign of injury.  SPECIMEN: none  EBL (mL): per Dr. Farris note  INDICATIONS FOR PROCEDURE: Debbi Grossman is a 33 year old female with an emergent .  Postoperatively he was noted to have large amounts of gross blood in her Medina catheter.  She was taken to the operating room by Dr. Farris as team and I was called for an intraoperative consult.  DETAILS OF PROCEDURE: Dr. Farris had just explored the abdomen prior to my arrival.  She noted that the bladder distended without difficulty she injected methylene blue and noticed no blue fluid in the operative field.  She had tried flexible cystoscopy but was unable to make sense due to the amount of blood in the bladder.     We repositioned the patient in the lithotomy position and I placed a 25 Guatemalan cystoscope.  External genitalia was very edematous and soon upon entry into the bladder large amounts of blood were encountered and was very challenging to discern the anatomy.  I did see what appeared to be normal normal urothelium.  However there also appeared to be a large vacuous area and it was unclear whether it was a very distended bladder with a large clot or whether there was  some communication with the GYN tract.  Upon manual exam there was a clear bridge of tissue between cystoscope and the anterior vaginal wall.  She had a cystogram which showed the bladder shape in the pelvis but there appeared to be a large amount of extravasation from the dome of the bladder.  The ureteral orifices were eventually identified through all the clot and blood.  A Retrograde was obtained first through a flimsy open-ended catheter that did not pass easily through the ureter, so I got a tiger tail catheter which worked much better.  I initially believed the ureter may be obstructed on the right side however the wire passed up into the renal pelvis on the right side with some difficulty due to the tortuosity of the ureter.  There was some hydronephrosis on the right side though this is commonly seen in gravid women.     Because of my concern that there was a large hole in the bladder requiring re-exploration and I could feel her abdomen was distending during the course of the cystoscopy.  We decided to place ureteral stents for ease and identification.  Placed a 6 x 26 double-J stent on the right side.  I then identified the left ureteral orifice and intubated it with an open-ended catheter.  I shot a retrograde pyelogram this appeared to be completely normal nondilated no evidence of contrast extravasation.  I placed an open-ended 6 Moroccan ureteral catheter and introduced into the Medina.     There was noted upon breaking the scope throughout the course of the procedure copious amounts of hunter blood coming from the Medina or the scope sheath.     I replaced the Medina and then we determined to reexplore the operative field.     We reprepped and draped and opened the Pfannenstiel incision.  We dissected down and discovered eventually after opening the  incision that the bladder had been inadvertently 7 to the uterus.  There was a large defect involving the entire dome of the bladder. Through  dissection were able to separate the uterus and cervix from the bladder.  The bleeding was coming from the cuff of the cervix and the uterus, which had been in communication with the bladder.  Dr. Farris oversewed this area and hemostasis appeared to be much better.  We evacuated a large amount of blood from the bladder and then closed the bladder.  We closed with 3-0 Vicryl with multiple running and interrupted stitches.  The posterior bladder wall/dome most adjacent to the vaginal wall was very thin.  There were several holes that formed when the tissue tore in trying to close the bladder.  Eventually it appeared largely watertight with 120 ccs of fluid injected.  The return of urine from the Medina was clear.     The ureteral stents were removed at the conclusion of the case.  Will need to leave the Medina in for at least 2 weeks and get a cystogram prior to removal     Pedro Luis Mireles MD  Department of Urology Staff  [facility]     Note, dictation software may have been used for this note and the content was not proofread for accuracy

## 2019-09-12 NOTE — NURSING NOTE
"Chief Complaint   Patient presents with     Follow Up     Cystogram follow up       Blood pressure 105/60, pulse 66, height 1.765 m (5' 9.5\"), weight 63 kg (139 lb), last menstrual period 2018, currently breastfeeding. Body mass index is 20.23 kg/m .    Patient Active Problem List   Diagnosis     Scoliosis     Inflammatory acne     Comedonal acne     Chronic nasal congestion     Acute seasonal allergic rhinitis, unspecified trigger     Supervision of normal IUP (intrauterine pregnancy) in primigravida     Indication for care in labor or delivery     Onset (spontaneous) of labor after 37 completed weeks of gestation but before 39 completed weeks gestation, with delivery by (planned)  section     Status post  delivery     Acute blood loss anemia       Allergies   Allergen Reactions     Seasonal Allergies        Current Outpatient Medications   Medication Sig Dispense Refill     acetaminophen (TYLENOL) 325 MG tablet Take 2 tablets (650 mg) by mouth every 4 hours as needed for other (multimodal surgical pain management along with NSAIDS and opioid medication as indicated based on pain control and physical function.) 90 tablet 1     Docosahexaenoic Acid (DHA PO)        ibuprofen (ADVIL/MOTRIN) 800 MG tablet Take 1 tablet (800 mg) by mouth every 6 hours as needed for other (cramping) 90 tablet 1     mometasone (NASONEX) 50 MCG/ACT spray Spray 2 sprays into both nostrils daily 3 Box 3     nitroFURantoin macrocrystal-monohydrate (MACROBID) 100 MG capsule Take 1 capsule (100 mg) by mouth 2 times daily 14 capsule 0     Prenatal Multivit-Min-Fe-FA (PRENATAL VITAMINS PO) Take 1 tablet by mouth       senna-docusate (SENOKOT-S/PERICOLACE) 8.6-50 MG tablet Take 1 tablet by mouth 2 times daily as needed for constipation 60 tablet 1     adapalene (DIFFERIN) 0.1 % gel Apply  topically At Bedtime.Need annual office visit (Patient not taking: Reported on 2019) 45 g 3     benzoyl peroxide-erythromycin " (BENZAMYCIN) topical gel Apply twice daily (Patient not taking: Reported on 9/11/2019) 45 g 3     oxyCODONE (ROXICODONE) 5 MG tablet Take 1-2 tablets (5-10 mg) by mouth every 3 hours as needed for moderate to severe pain (Patient not taking: Reported on 9/11/2019) 20 tablet 0       Social History     Tobacco Use     Smoking status: Never Smoker     Smokeless tobacco: Never Used   Substance Use Topics     Alcohol use: No     Frequency: Never     Comment: Less than once a month     Drug use: No       ASHLEY Jay  9/12/2019  3:53 PM

## 2019-09-14 NOTE — PROGRESS NOTES
Chief Complaint:    Bladder injury during            Consult or Referral:     Ms.. Debbi Grossman is a 33 year old female seen in consultation from Dr. Gross.         History of Present Illness:    Debbi Grossman is a very pleasant 33 year old female who presents with a history of bladder injury during .  I was called for an interoperative consult and repaired the bladder.  The dome of the bladder had been inadvertantly anastomosed to the uterus.  We took this down and repaired the bladder.  The posterior wall of the dome was very thin.    We left in a mcmahon and now today a cystogram shows no extravasation.  The bladder dome though is missing.    She had one episode of hematuria after repair, but recently has been doing well.    Baby is doing well              Past Medical History:     Past Medical History:   Diagnosis Date     Acute blood loss anemia 2019     CP (cerebral palsy) (H)      Onset (spontaneous) of labor after 37 completed weeks of gestation but before 39 completed weeks gestation, with delivery by (planned)  section 2019     Scoliosis 5/10/2012     Seasonal allergies      Status post  delivery 2019    Baby Boy-name is Kristyn            Past Surgical History:     Past Surgical History:   Procedure Laterality Date      SECTION N/A 2019    Procedure:  SECTION;  Surgeon: Natalee Farris MD;  Location:  [facility]     CYSTOSCOPY N/A 2019    Procedure: CYSTOSCOPY;  Surgeon: Natalee Farris MD;  Location:  OR     CYSTOSCOPY, CYSTOGRAM, COMBINED N/A 2019    Procedure: Cystoscopy, cystogram, combined;  Surgeon: Pedro Luis Mireles MD;  Location:  OR     CYSTOSCOPY, RETROGRADES, INSERT STENT URETER(S), COMBINED  2019    Procedure: CYSTOSCOPY WITH BILATERAL RETROGRADES/ BILATERAL STENT PLACEMENT AND REMOVAL/ CYSTOGRAM/ BLADDER REPAIR;  Surgeon: Pedro Luis Mireles MD;  Location:  OR     HC REMOVE  TONSILS/ADENOIDS,<13 Y/O       HC TOOTH EXTRACTION W/FORCEP      [place] Teeth     LAPAROTOMY EXPLORATORY N/A 8/23/2019    Procedure: EXPLORATORY LAPAROTOMY WITH REPEAT EXPLORATORY LAPAROTOMY WITH BLADDER REPAIR;  Surgeon: Natalee Farris MD;  Location: [facility]     REPAIR BLADDER N/A 8/23/2019    Procedure: REPAIR, BLADDER;  Surgeon: Natalee Farris MD;  Location: [facility]            Medications     Current Outpatient Medications   Medication     acetaminophen (TYLENOL) 325 MG tablet     Docosahexaenoic Acid (DHA PO)     ibuprofen (ADVIL/MOTRIN) 800 MG tablet     mometasone (NASONEX) 50 MCG/ACT spray     nitroFURantoin macrocrystal-monohydrate (MACROBID) 100 MG capsule     Prenatal Multivit-Min-Fe-FA (PRENATAL VITAMINS PO)     senna-docusate (SENOKOT-S/PERICOLACE) 8.6-50 MG tablet     adapalene (DIFFERIN) 0.1 % gel     benzoyl peroxide-erythromycin (BENZAMYCIN) topical gel     oxyCODONE (ROXICODONE) 5 MG tablet     No current facility-administered medications for this visit.             Family History:     Family History   Problem Relation Age of Onset     Hypertension Father      Diabetes Father      Psychotic Disorder Maternal Grandmother      Prostate Cancer Maternal Grandfather      Cancer - colorectal Maternal Grandfather      Diabetes Maternal Grandfather      Depression Brother         bipolar depression     Neurologic Disorder Brother         epliepsy     Neurologic Disorder Sister             Social History:     Social History     Socioeconomic History     Marital status:      Spouse name: Not on file     Number of children: 0     Years of education: Not on file     Highest education level: Not on file   Occupational History     Employer: [employer]   Social Needs     Financial resource strain: Not on file     Food insecurity:     Worry: Not on file     Inability: Not on file     Transportation needs:     Medical: Not on file     Non-medical: Not on file   Tobacco Use     Smoking status: Never Smoker      "Smokeless tobacco: Never Used   Substance and Sexual Activity     Alcohol use: No     Frequency: Never     Comment: Less than once a month     Drug use: No     Sexual activity: Not Currently     Partners: Male     Birth control/protection: None   Lifestyle     Physical activity:     Days per week: Not on file     Minutes per session: Not on file     Stress: Not on file   Relationships     Social connections:     Talks on phone: Not on file     Gets together: Not on file     Attends Druze service: Not on file     Active member of club or organization: Not on file     Attends meetings of clubs or organizations: Not on file     Relationship status: Not on file     Intimate partner violence:     Fear of current or ex partner: Not on file     Emotionally abused: Not on file     Physically abused: Not on file     Forced sexual activity: Not on file   Other Topics Concern     Parent/sibling w/ CABG, MI or angioplasty before 65F 55M? Not Asked   Social History Narrative     Not on file            Allergies:   Seasonal allergies         Review of Systems:  From intake questionnaire     Negative 14 system review except as noted on HPI, nurse's note.         Physical Exam:     Patient is a 33 year old  female   Vitals: Blood pressure 105/60, pulse 66, height 1.765 m (5' 9.5\"), weight 63 kg (139 lb), last menstrual period 11/16/2018, currently breastfeeding.  General Appearance Adult: Alert, no acute distress, oriented  HENT: throat/mouth:normal, good dentition  Neck: No adenopathy,masses or thyromegaly  Lungs: no respiratory distress, or pursed lip breathing  Heart: No obvious jugular venous distension present  Abdomen: soft, nontender, no organomegaly or masses, Body mass index is 20.23 kg/m ., scars noted Pfannenstiel  Lymphatics: No cervical or supraclavicular adenopathy  Musculoskeltal: extremities normal, no peripheral edema  Skin: no suspicious lesions or rashes  Neuro: Alert, oriented, speech and mentation " normal  Psych: affect and mood normal  Gait: Normal  : mcmahon in place with clear urine      Labs and Pathology:    I reviewed all applicable laboratory and pathology data and went over findings with patient  Significant for   Lab Results   Component Value Date    CR 0.55 08/27/2019    CR 0.55 08/26/2019    CR 0.51 08/25/2019    CR 0.58 08/24/2019    CR 0.57 08/23/2019    CR 0.54 08/23/2019    CR 0.65 08/21/2012     No results found for: PSA          Imaging:    I reviewed all applicable imaging and went over findings with patient.  Significant for cystogram shows well healed bladder with no extravasation      Outside and Past Medical records:    I spent 10 minutes reviewing outside and past medical records.           Assessment and Plan:     Assessment:   Bladder injury, with repair and well healed      Plan:  Successful TOV today.    Bladder volume may be reduced, but hopefully will expand with time.    If she has trouble, she should contact us. We can perform UDS (urodynamics).    If hematuria, she should drink more water. If clots she should call us.    Overall, though she should do well.      Orders  Orders Placed This Encounter   Procedures     MEASURE POST-VOID RESIDUAL URINE/BLADDER CAPACITY, US NON-IMAGING     Routine UA with micro reflex to culture       F/U:   TEENA Mireles MD  Department of Urology Staff  [facility]    Note, dictation software may have been used for this note and the content was not proofread for accuracy    CC  Patient Care Team:  Marixa Roche PA-C as PCP - General (Physician Assistant)  Marixa Roche PA-C as Assigned PCP  SELF, REFERRED    Copy to patient  DIMPLE DIMAS  [id number] [facility] [id number]

## 2019-09-17 DIAGNOSIS — Z53.9 DIAGNOSIS NOT YET DEFINED: Primary | ICD-10-CM

## 2019-09-20 ENCOUNTER — OFFICE VISIT (OUTPATIENT)
Dept: OBGYN | Facility: CLINIC | Age: 34
End: 2019-09-20
Payer: COMMERCIAL

## 2019-09-20 VITALS — DIASTOLIC BLOOD PRESSURE: 60 MMHG | BODY MASS INDEX: 19.8 KG/M2 | WEIGHT: 136 LBS | SYSTOLIC BLOOD PRESSURE: 102 MMHG

## 2019-09-20 DIAGNOSIS — Z48.89 ENCOUNTER FOR POST SURGICAL WOUND CHECK: Primary | ICD-10-CM

## 2019-09-20 PROCEDURE — 99024 POSTOP FOLLOW-UP VISIT: CPT | Performed by: OBSTETRICS & GYNECOLOGY

## 2019-09-20 NOTE — PATIENT INSTRUCTIONS
Incision healing well.  No further drainage noted and no signs of infection.  Will continue to monitor.  Warning signs discussed.  RTC for routine PP visit in 2 weeks.

## 2019-09-20 NOTE — PROGRESS NOTES
SUBJECTIVE:                                                   Debbi Grossman is a 33 year old female who presents to clinic today for the following health issue(s):  Patient presents with:  Follow Up: f/u incision check, stopped draining after 4 days      HPI:  Here today for incision check --had increase in drainage last week which has since resolved.  Reports continued straw colored drainage for approximatly 4d after our appt last week.  No purulence, no foul smell, no increase in redness or pain.  Has not needed to cover it for the last few days.  Had normal cystogram with Dr. Mireles last week and got to get rid of mcmahon catheter!!!  So happy and feeling signficantly better.  No issues or problems with urination.  Overall feeling very good --eating, drinking well.  NO bowel issues.  Only light vaginal spotting  Out and about more this week without nuisance of mcmahon bag      Patient's last menstrual period was 2018..   Patient is not sexually active, .  Using not sexually active for contraception.    reports that she has never smoked. She has never used smokeless tobacco.    STD testing offered?  Declined    Health maintenance updated:  yes    Problem list and histories reviewed & adjusted, as indicated.  Additional history: as documented.    Patient Active Problem List   Diagnosis     Scoliosis     Inflammatory acne     Comedonal acne     Chronic nasal congestion     Acute seasonal allergic rhinitis, unspecified trigger     Supervision of normal IUP (intrauterine pregnancy) in primigravida     Indication for care in labor or delivery     Onset (spontaneous) of labor after 37 completed weeks of gestation but before 39 completed weeks gestation, with delivery by (planned)  section     Status post  delivery     Acute blood loss anemia     Past Surgical History:   Procedure Laterality Date      SECTION N/A 2019    Procedure:  SECTION;  Surgeon: Natalee Farris,  MD;  Location:  [facility]     CYSTOSCOPY N/A 8/23/2019    Procedure: CYSTOSCOPY;  Surgeon: Natalee Farris MD;  Location: [facility]     CYSTOSCOPY, CYSTOGRAM, COMBINED N/A 8/23/2019    Procedure: Cystoscopy, cystogram, combined;  Surgeon: Pedro Luis Mireles MD;  Location: [facility]     CYSTOSCOPY, RETROGRADES, INSERT STENT URETER(S), COMBINED  8/23/2019    Procedure: CYSTOSCOPY WITH BILATERAL RETROGRADES/ BILATERAL STENT PLACEMENT AND REMOVAL/ CYSTOGRAM/ BLADDER REPAIR;  Surgeon: Pedro Luis Mireles MD;  Location: [facility]     HC REMOVE TONSILS/ADENOIDS,<13 Y/O       HC TOOTH EXTRACTION W/FORCEP      [place] Teeth     LAPAROTOMY EXPLORATORY N/A 8/23/2019    Procedure: EXPLORATORY LAPAROTOMY WITH REPEAT EXPLORATORY LAPAROTOMY WITH BLADDER REPAIR;  Surgeon: Natalee Farris MD;  Location[facility]     REPAIR BLADDER N/A 8/23/2019    Procedure: REPAIR, BLADDER;  Surgeon: Natalee Farris MD;  Location:  OR      Social History     Tobacco Use     Smoking status: Never Smoker     Smokeless tobacco: Never Used   Substance Use Topics     Alcohol use: No     Frequency: Never     Comment: Less than once a month      Problem (# of Occurrences) Relation (Name,Age of Onset)    Cancer - colorectal (1) Maternal Grandfather    Depression (1) Brother: bipolar depression    Diabetes (2) Father, Maternal Grandfather    Hypertension (1) Father    Neurologic Disorder (2) Brother: epliepsy, Sister    Prostate Cancer (1) Maternal Grandfather    Psychotic Disorder (1) Maternal Grandmother            Current Outpatient Medications   Medication Sig     acetaminophen (TYLENOL) 325 MG tablet Take 2 tablets (650 mg) by mouth every 4 hours as needed for other (multimodal surgical pain management along with NSAIDS and opioid medication as indicated based on pain control and physical function.)     adapalene (DIFFERIN) 0.1 % gel Apply  topically At Bedtime.Need annual office visit     benzoyl peroxide-erythromycin (BENZAMYCIN) topical gel  Apply twice daily     Docosahexaenoic Acid (DHA PO)      ibuprofen (ADVIL/MOTRIN) 800 MG tablet Take 1 tablet (800 mg) by mouth every 6 hours as needed for other (cramping)     mometasone (NASONEX) 50 MCG/ACT spray Spray 2 sprays into both nostrils daily     Prenatal Multivit-Min-Fe-FA (PRENATAL VITAMINS PO) Take 1 tablet by mouth     senna-docusate (SENOKOT-S/PERICOLACE) 8.6-50 MG tablet Take 1 tablet by mouth 2 times daily as needed for constipation     oxyCODONE (ROXICODONE) 5 MG tablet Take 1-2 tablets (5-10 mg) by mouth every 3 hours as needed for moderate to severe pain (Patient not taking: Reported on 9/20/2019)     No current facility-administered medications for this visit.      Allergies   Allergen Reactions     Seasonal Allergies        ROS:  12 point review of systems negative other than symptoms noted below.    OBJECTIVE:     /60   Wt 61.7 kg (136 lb)   LMP 11/16/2018   Breastfeeding? Yes   BMI 19.80 kg/m    Body mass index is 19.8 kg/m .    Exam:  Constitutional:  Appearance: Well nourished, well developed alert, in no acute distress  Gastrointestinal:  Abdominal Examination:  Abdomen nontender to palpation, tone normal without rigidity or guarding, no masses present, umbilicus without lesions; Liver/Spleen:  No hepatomegaly present, liver nontender to palpation; Hernias:  No hernias present  INCISION:  WELL HEALED; NO FURTHER DRAINAGE; NONTENDER; NO REDNESS OR IRRITATION; SOME FIRMNESS ALONG SUPERIOR ASPECT OF INCISION ON RIGHT SIDE; LEFT SOFT, NORMAL  Skin:General Inspection:  No rashes present, no lesions present, no areas of discoloration; Genitalia and Groin:  No rashes present, no lesions present, no areas of discoloration, no masses present.  Neurologic/Psychiatric:  Mental Status:  Oriented X3      In-Clinic Test Results:  No results found for this or any previous visit (from the past 24 hour(s)).    ASSESSMENT/PLAN:                                                        ICD-10-CM     1. Encounter for post surgical wound check Z48.89        Patient Instructions   Incision healing well.  No further drainage noted and no signs of infection.  Will continue to monitor.  Warning signs discussed.  RTC for routine PP visit in 2 weeks.      Natalee Farris MD  [facility]

## 2019-09-24 NOTE — PROGRESS NOTES
HPI and Plan:   I had the pleasure of seeing Debbi Grossman today at [facility] for evaluation of atrial fibrillation. She is a pleasant 33 year old patient of Dr. Mahoney with a past medical history of atrial fibrillation.    Ms. Grossman was admitted to [facility] on 2019 for active labor.  She received a epidural and was noted to be in atrial fibrillation with a rapid ventricular rate.  She received IV diltiazem and spontaneously converted to sinus rhythm.  She underwent a successful  and was transferred to the ICU for tachycardia and hemorrhagic shock.  It was thought that her bladder was damaged during her   which led to significant hemorrhagic shock with a hemoglobin of 8.  She received 1 unit of packed red blood cells.  She underwent extensive surgery for bladder repair and cervical laceration. she had an echocardiogram which showed a normal left ventricular systolic function with an ejection fraction of 55 to 60%.  The right atrium was mildly dilated.  She was discharged on a ZIO patch monitor.  Given that her CHADS VASDC score is 0, no aspirin or anticoagulation was prescribed.    The zio patch monitor revealed one run of supraventricular tachycardia up to 4 beats with a heart rate of 106 bpm.  There were no triggered events.  The minimum heart rate was 51 bpm, maximum heart rate 156 bpm, average heart rate 85 bpm.  The predominant rhythm is sinus rhythm.      Today she presents the cardiology clinic for a one-month discharge follow-up. She has been doing well since discharge without recurrent palpitations.  She denies chest pain or shortness of breath.  Her baby is 1 month old and is sleeping through the night.  She plans to return to work in November.    Physical Exam  Please see Below     Assessment and Plan  1.  Paroxysmal atrial fibrillation.  No recurrence noted on ZIO Patch monitor. CHADS VASDC score is 0.  The atrial fibrillation  was likely related to the stress events taking place.  We can continue to monitor for recurrence moving forward.  Should the atrial fibrillation return, we could consider metoprolol and anticoagulation at that time.    2.  Supraventricular tachycardia, paroxysmal.  1 SVT went up to 4 beats at a rate of 106 bpm noted on ZIO Patch monitor.  This is likely benign.  She denies palpitations.  At this time, I do not think it is necessary to treat the SVT as she is asymptomatic and denies recurrence.  We can continue to monitor.  In the future, if she notes a recurrence of palpitations, we could consider metoprolol.  I do not think she needs to follow with cardiology.  She is okay to see her primary care provider.  In the future if cardiology is needed, I would be happy to see her.      Thank you for allowing me to care for Debbi Grossman today.    ASHLEY Yoon, CNP  Cardiology    Voice recognition software was used for this note, I have reviewed this note, but errors may have been missed.    No orders of the defined types were placed in this encounter.    No orders of the defined types were placed in this encounter.    Medications Discontinued During This Encounter   Medication Reason     oxyCODONE (ROXICODONE) 5 MG tablet Stopped by Patient         CURRENT MEDICATIONS:  Current Outpatient Medications   Medication Sig Dispense Refill     acetaminophen (TYLENOL) 325 MG tablet Take 2 tablets (650 mg) by mouth every 4 hours as needed for other (multimodal surgical pain management along with NSAIDS and opioid medication as indicated based on pain control and physical function.) 90 tablet 1     adapalene (DIFFERIN) 0.1 % gel Apply  topically At Bedtime.Need annual office visit 45 g 3     benzoyl peroxide-erythromycin (BENZAMYCIN) topical gel Apply twice daily 45 g 3     Docosahexaenoic Acid (DHA PO)        ibuprofen (ADVIL/MOTRIN) 800 MG tablet Take 1 tablet (800 mg) by mouth every 6 hours as needed for other (cramping) 90  tablet 1     mometasone (NASONEX) 50 MCG/ACT spray Spray 2 sprays into both nostrils daily 3 Box 3     Prenatal Multivit-Min-Fe-FA (PRENATAL VITAMINS PO) Take 1 tablet by mouth       senna-docusate (SENOKOT-S/PERICOLACE) 8.6-50 MG tablet Take 1 tablet by mouth 2 times daily as needed for constipation 60 tablet 1       ALLERGIES     Allergies   Allergen Reactions     Seasonal Allergies        PAST MEDICAL HISTORY:  Past Medical History:   Diagnosis Date     Acute blood loss anemia 2019     CP (cerebral palsy) (H)      Onset (spontaneous) of labor after 37 completed weeks of gestation but before 39 completed weeks gestation, with delivery by (planned)  section 2019     PAF (paroxysmal atrial fibrillation) (H)      Scoliosis 5/10/2012     Seasonal allergies      Status post  delivery 2019    Baby Boy-name is Kristyn       PAST SURGICAL HISTORY:  Past Surgical History:   Procedure Laterality Date      SECTION N/A 2019    Procedure:  SECTION;  Surgeon: Natalee Farris MD;  Location:  L+D     CYSTOSCOPY N/A 2019    Procedure: CYSTOSCOPY;  Surgeon: Natalee Farris MD;  Location:  OR     CYSTOSCOPY, CYSTOGRAM, COMBINED N/A 2019    Procedure: Cystoscopy, cystogram, combined;  Surgeon: Pedro Luis Mireles MD;  Location:  OR     CYSTOSCOPY, RETROGRADES, INSERT STENT URETER(S), COMBINED  2019    Procedure: CYSTOSCOPY WITH BILATERAL RETROGRADES/ BILATERAL STENT PLACEMENT AND REMOVAL/ CYSTOGRAM/ BLADDER REPAIR;  Surgeon: Pedro Luis Mireles MD;  Location:  OR      REMOVE TONSILS/ADENOIDS,<13 Y/O       HC TOOTH EXTRACTION W/FORCEP      [place] [facility]     LAPAROTOMY EXPLORATORY N/A 2019    Procedure: EXPLORATORY LAPAROTOMY WITH REPEAT EXPLORATORY LAPAROTOMY WITH BLADDER REPAIR;  Surgeon: Natalee Farris MD;  Location:  OR     REPAIR BLADDER N/A 2019    Procedure: REPAIR, BLADDER;  Surgeon: Natalee Farris MD;  Location:   OR       FAMILY HISTORY:  Family History   Problem Relation Age of Onset     Hypertension Father      Diabetes Father      Psychotic Disorder Maternal Grandmother      Prostate Cancer Maternal Grandfather      Cancer - colorectal Maternal Grandfather      Diabetes Maternal Grandfather      Depression Brother         bipolar depression     Neurologic Disorder Brother         epliepsy     Neurologic Disorder Sister        SOCIAL HISTORY:  Social History     Socioeconomic History     Marital status:      Spouse name: None     Number of children: 0     Years of education: None     Highest education level: None   Occupational History     Employer: [employer]   Social Needs     Financial resource strain: None     Food insecurity:     Worry: None     Inability: None     Transportation needs:     Medical: None     Non-medical: None   Tobacco Use     Smoking status: Never Smoker     Smokeless tobacco: Never Used   Substance and Sexual Activity     Alcohol use: No     Frequency: Never     Comment: Less than once a month     Drug use: No     Sexual activity: Not Currently     Partners: Male     Birth control/protection: None   Lifestyle     Physical activity:     Days per week: None     Minutes per session: None     Stress: None   Relationships     Social connections:     Talks on phone: None     Gets together: None     Attends Jain service: None     Active member of club or organization: None     Attends meetings of clubs or organizations: None     Relationship status: None     Intimate partner violence:     Fear of current or ex partner: None     Emotionally abused: None     Physically abused: None     Forced sexual activity: None   Other Topics Concern     Parent/sibling w/ CABG, MI or angioplasty before 65F 55M? Not Asked   Social History Narrative     None       Review of Systems:  Skin:  Negative       Eyes:  Positive for glasses;contacts    ENT:  Negative      Respiratory:  Negative       Cardiovascular:     Positive for;fatigue also postpartum  Gastroenterology: Negative      Genitourinary:  Negative      Musculoskeletal:  Negative      Neurologic:  Negative      Psychiatric:  Negative      Heme/Lymph/Imm:  Positive for allergies seasonal  Endocrine:  Negative        Physical Exam:  Vitals: /66   Pulse 72   Wt 63.4 kg (139 lb 12.8 oz)   LMP 11/16/2018   BMI 20.35 kg/m      Constitutional:  cooperative, alert and oriented, well developed, well nourished, in no acute distress        Skin:  warm and dry to the touch          Head:  normocephalic        Eyes:  pupils equal and round;sclera white        Lymph:      ENT:  no pallor or cyanosis, dentition good        Neck:  carotid pulses are full and equal bilaterally, JVP normal, no carotid bruit        Respiratory:  normal breath sounds, clear to auscultation, normal A-P diameter, normal symmetry, normal respiratory excursion, no use of accessory muscles         Cardiac: regular rhythm, normal S1/S2, no S3 or S4, apical impulse not displaced, no murmurs, gallops or rubs                pulses full and equal, no bruits auscultated                                        GI:  abdomen soft;non-tender        Extremities and Muscular Skeletal:  no edema;no deformities, clubbing, cyanosis, erythema observed              Neurological:  no gross motor deficits;affect appropriate        Psych:  Alert and Oriented x 3    No diagnosis found.    Recent Lab Results:  LIPID RESULTS:  Lab Results   Component Value Date    CHOL 190 03/29/2018    HDL 93 03/29/2018    LDL 89 03/29/2018    TRIG 41 03/29/2018    CHOLHDLRATIO 2.6 08/04/2015       LIVER ENZYME RESULTS:  Lab Results   Component Value Date    AST 36 08/26/2019    ALT 18 08/26/2019       CBC RESULTS:  Lab Results   Component Value Date    WBC 6.0 09/11/2019    RBC 3.56 (L) 09/11/2019    HGB 11.0 (L) 09/11/2019    HCT 34.1 (L) 09/11/2019    MCV 96 09/11/2019    MCH 30.9 09/11/2019    MCHC 32.3 09/11/2019    RDW 13.5  09/11/2019     09/11/2019       BMP RESULTS:  Lab Results   Component Value Date     (H) 08/27/2019    POTASSIUM 3.5 08/27/2019    CHLORIDE 113 (H) 08/27/2019    CO2 27 08/27/2019    ANIONGAP 5 08/27/2019    GLC 84 08/27/2019    BUN 3 (L) 08/27/2019    CR 0.55 08/27/2019    GFRESTIMATED >90 08/27/2019    GFRESTBLACK >90 08/27/2019    MITZI 7.9 (L) 08/27/2019        A1C RESULTS:  No results found for: A1C    INR RESULTS:  Lab Results   Component Value Date    INR 1.23 (H) 08/24/2019    INR 1.27 (H) 08/24/2019           CC  Marixa Roche PA-C  [id number] [id number]

## 2019-09-25 ENCOUNTER — OFFICE VISIT (OUTPATIENT)
Dept: CARDIOLOGY | Facility: CLINIC | Age: 34
End: 2019-09-25
Payer: COMMERCIAL

## 2019-09-25 VITALS
SYSTOLIC BLOOD PRESSURE: 100 MMHG | BODY MASS INDEX: 20.35 KG/M2 | WEIGHT: 139.8 LBS | DIASTOLIC BLOOD PRESSURE: 66 MMHG | HEART RATE: 72 BPM

## 2019-09-25 DIAGNOSIS — I48.0 PAF (PAROXYSMAL ATRIAL FIBRILLATION) (H): Primary | ICD-10-CM

## 2019-09-25 PROCEDURE — 99213 OFFICE O/P EST LOW 20 MIN: CPT | Performed by: NURSE PRACTITIONER

## 2019-09-25 NOTE — LETTER
2019    Marixa Roche PA-C  [id number] [address]  [place] [address]    RE: Debbi Ngoc       Dear Colleague,    I had the pleasure of seeing Debbi Grossman in the [facility].    HPI and Plan:   I had the pleasure of seeing Debbi Grossman today at [facility] for evaluation of atrial fibrillation. She is a pleasant 33 year old patient of Dr. Mahoney with a past medical history of atrial fibrillation.    Ms. Grossman was admitted to [facility] on 2019 for active labor.  She received a epidural and was noted to be in atrial fibrillation with a rapid ventricular rate.  She received IV diltiazem and spontaneously converted to sinus rhythm.  She underwent a successful  and was transferred to the ICU for tachycardia and hemorrhagic shock.  It was thought that her bladder was damaged during her   which led to significant hemorrhagic shock with a hemoglobin of 8.  She received 1 unit of packed red blood cells.  She underwent extensive surgery for bladder repair and cervical laceration. she had an echocardiogram which showed a normal left ventricular systolic function with an ejection fraction of 55 to 60%.  The right atrium was mildly dilated.  She was discharged on a ZIO patch monitor.  Given that her CHADS VASDC score is 0, no aspirin or anticoagulation was prescribed.    The zio patch monitor revealed one run of supraventricular tachycardia up to 4 beats with a heart rate of 106 bpm.  There were no triggered events.  The minimum heart rate was 51 bpm, maximum heart rate 156 bpm, average heart rate 85 bpm.  The predominant rhythm is sinus rhythm.      Today she presents the cardiology clinic for a one-month discharge follow-up. She has been doing well since discharge without recurrent palpitations.  She denies chest pain or shortness of breath.  Her baby is 1 month old and is sleeping through the night.  She plans  to return to work in November.    Physical Exam  Please see Below     Assessment and Plan  1.  Paroxysmal atrial fibrillation.  No recurrence noted on ZIO Patch monitor. CHADS VASDC score is 0.  The atrial fibrillation was likely related to the stress events taking place.  We can continue to monitor for recurrence moving forward.  Should the atrial fibrillation return, we could consider metoprolol and anticoagulation at that time.    2.  Supraventricular tachycardia, paroxysmal.  1 SVT went up to 4 beats at a rate of 106 bpm noted on ZIO Patch monitor.  This is likely benign.  She denies palpitations.  At this time, I do not think it is necessary to treat the SVT as she is asymptomatic and denies recurrence.  We can continue to monitor.  In the future, if she notes a recurrence of palpitations, we could consider metoprolol.  I do not think she needs to follow with cardiology.  She is okay to see her primary care provider.  In the future if cardiology is needed, I would be happy to see her.      Thank you for allowing me to care for Debbi Grossman today.    ASHLEY Yoon, CNP  Cardiology    Voice recognition software was used for this note, I have reviewed this note, but errors may have been missed.    No orders of the defined types were placed in this encounter.    No orders of the defined types were placed in this encounter.    Medications Discontinued During This Encounter   Medication Reason     oxyCODONE (ROXICODONE) 5 MG tablet Stopped by Patient         CURRENT MEDICATIONS:  Current Outpatient Medications   Medication Sig Dispense Refill     acetaminophen (TYLENOL) 325 MG tablet Take 2 tablets (650 mg) by mouth every 4 hours as needed for other (multimodal surgical pain management along with NSAIDS and opioid medication as indicated based on pain control and physical function.) 90 tablet 1     adapalene (DIFFERIN) 0.1 % gel Apply  topically At Bedtime.Need annual office visit 45 g 3     benzoyl  peroxide-erythromycin (BENZAMYCIN) topical gel Apply twice daily 45 g 3     Docosahexaenoic Acid (DHA PO)        ibuprofen (ADVIL/MOTRIN) 800 MG tablet Take 1 tablet (800 mg) by mouth every 6 hours as needed for other (cramping) 90 tablet 1     mometasone (NASONEX) 50 MCG/ACT spray Spray 2 sprays into both nostrils daily 3 Box 3     Prenatal Multivit-Min-Fe-FA (PRENATAL VITAMINS PO) Take 1 tablet by mouth       senna-docusate (SENOKOT-S/PERICOLACE) 8.6-50 MG tablet Take 1 tablet by mouth 2 times daily as needed for constipation 60 tablet 1       ALLERGIES     Allergies   Allergen Reactions     Seasonal Allergies        PAST MEDICAL HISTORY:  Past Medical History:   Diagnosis Date     Acute blood loss anemia 2019     CP (cerebral palsy) (H)      Onset (spontaneous) of labor after 37 completed weeks of gestation but before 39 completed weeks gestation, with delivery by (planned)  section 2019     PAF (paroxysmal atrial fibrillation) (H)      Scoliosis 5/10/2012     Seasonal allergies      Status post  delivery 2019    Baby Boy-name is Kristyn       PAST SURGICAL HISTORY:  Past Surgical History:   Procedure Laterality Date      SECTION N/A 2019    Procedure:  SECTION;  Surgeon: Ntaalee Farris MD;  Location:  L+D     CYSTOSCOPY N/A 2019    Procedure: CYSTOSCOPY;  Surgeon: Natalee Farris MD;  Location:  OR     CYSTOSCOPY, CYSTOGRAM, COMBINED N/A 2019    Procedure: Cystoscopy, cystogram, combined;  Surgeon: Pedro Luis Mireles MD;  Location:  OR     CYSTOSCOPY, RETROGRADES, INSERT STENT URETER(S), COMBINED  2019    Procedure: CYSTOSCOPY WITH BILATERAL RETROGRADES/ BILATERAL STENT PLACEMENT AND REMOVAL/ CYSTOGRAM/ BLADDER REPAIR;  Surgeon: Pedro Luis Mireles MD;  Location:  OR     HC REMOVE TONSILS/ADENOIDS,<11 Y/O       HC TOOTH EXTRACTION W/FORCEP      [place] Teeth     LAPAROTOMY EXPLORATORY N/A 2019    Procedure:  EXPLORATORY LAPAROTOMY WITH REPEAT EXPLORATORY LAPAROTOMY WITH BLADDER REPAIR;  Surgeon: Natalee Farris MD;  Location:  OR     REPAIR BLADDER N/A 8/23/2019    Procedure: REPAIR, BLADDER;  Surgeon: Natalee Farris MD;  Location:  OR       FAMILY HISTORY:  Family History   Problem Relation Age of Onset     Hypertension Father      Diabetes Father      Psychotic Disorder Maternal Grandmother      Prostate Cancer Maternal Grandfather      Cancer - colorectal Maternal Grandfather      Diabetes Maternal Grandfather      Depression Brother         bipolar depression     Neurologic Disorder Brother         epliepsy     Neurologic Disorder Sister        SOCIAL HISTORY:  Social History     Socioeconomic History     Marital status:      Spouse name: None     Number of children: 0     Years of education: None     Highest education level: None   Occupational History     Employer: [employer]   Social Needs     Financial resource strain: None     Food insecurity:     Worry: None     Inability: None     Transportation needs:     Medical: None     Non-medical: None   Tobacco Use     Smoking status: Never Smoker     Smokeless tobacco: Never Used   Substance and Sexual Activity     Alcohol use: No     Frequency: Never     Comment: Less than once a month     Drug use: No     Sexual activity: Not Currently     Partners: Male     Birth control/protection: None   Lifestyle     Physical activity:     Days per week: None     Minutes per session: None     Stress: None   Relationships     Social connections:     Talks on phone: None     Gets together: None     Attends Scientologist service: None     Active member of club or organization: None     Attends meetings of clubs or organizations: None     Relationship status: None     Intimate partner violence:     Fear of current or ex partner: None     Emotionally abused: None     Physically abused: None     Forced sexual activity: None   Other Topics Concern     Parent/sibling w/ CABG, MI  or angioplasty before 65F 55M? Not Asked   Social History Narrative     None       Review of Systems:  Skin:  Negative       Eyes:  Positive for glasses;contacts    ENT:  Negative      Respiratory:  Negative       Cardiovascular:    Positive for;fatigue also postpartum  Gastroenterology: Negative      Genitourinary:  Negative      Musculoskeletal:  Negative      Neurologic:  Negative      Psychiatric:  Negative      Heme/Lymph/Imm:  Positive for allergies seasonal  Endocrine:  Negative        Physical Exam:  Vitals: /66   Pulse 72   Wt 63.4 kg (139 lb 12.8 oz)   LMP 11/16/2018   BMI 20.35 kg/m       Constitutional:  cooperative, alert and oriented, well developed, well nourished, in no acute distress        Skin:  warm and dry to the touch          Head:  normocephalic        Eyes:  pupils equal and round;sclera white        Lymph:      ENT:  no pallor or cyanosis, dentition good        Neck:  carotid pulses are full and equal bilaterally, JVP normal, no carotid bruit        Respiratory:  normal breath sounds, clear to auscultation, normal A-P diameter, normal symmetry, normal respiratory excursion, no use of accessory muscles         Cardiac: regular rhythm, normal S1/S2, no S3 or S4, apical impulse not displaced, no murmurs, gallops or rubs                pulses full and equal, no bruits auscultated                                        GI:  abdomen soft;non-tender        Extremities and Muscular Skeletal:  no edema;no deformities, clubbing, cyanosis, erythema observed              Neurological:  no gross motor deficits;affect appropriate        Psych:  Alert and Oriented x 3    No diagnosis found.    Recent Lab Results:  LIPID RESULTS:  Lab Results   Component Value Date    CHOL 190 03/29/2018    HDL 93 03/29/2018    LDL 89 03/29/2018    TRIG 41 03/29/2018    CHOLHDLRATIO 2.6 08/04/2015       LIVER ENZYME RESULTS:  Lab Results   Component Value Date    AST 36 08/26/2019    ALT 18 08/26/2019       CBC  RESULTS:  Lab Results   Component Value Date    WBC 6.0 09/11/2019    RBC 3.56 (L) 09/11/2019    HGB 11.0 (L) 09/11/2019    HCT 34.1 (L) 09/11/2019    MCV 96 09/11/2019    MCH 30.9 09/11/2019    MCHC 32.3 09/11/2019    RDW 13.5 09/11/2019     09/11/2019       BMP RESULTS:  Lab Results   Component Value Date     (H) 08/27/2019    POTASSIUM 3.5 08/27/2019    CHLORIDE 113 (H) 08/27/2019    CO2 27 08/27/2019    ANIONGAP 5 08/27/2019    GLC 84 08/27/2019    BUN 3 (L) 08/27/2019    CR 0.55 08/27/2019    GFRESTIMATED >90 08/27/2019    GFRESTBLACK >90 08/27/2019    MITZI 7.9 (L) 08/27/2019        A1C RESULTS:  No results found for: A1C    INR RESULTS:  Lab Results   Component Value Date    INR 1.23 (H) 08/24/2019    INR 1.27 (H) 08/24/2019       Thank you for allowing me to participate in the care of your patient.    Sincerely,     ASHLEY Yoon [facility]

## 2019-10-01 NOTE — PROGRESS NOTES
"  SUBJECTIVE:  Debbi Grossman,  is here for a postpartum visit.  She had a  Section  on 19 delivering a healthy baby boy, named Kristyn weighing 8 lbs 2.5 oz at term.      HPI:  Here today for postpartum visit --doing well.  Baby Kristyn has been good baby --good eater and sleeper.  Exclusively breast feeding.  Going 4hrs between nighttime feedings.    Debbi is doing well.  Eating/drinking well.  Back to most of her normal activities.  No vaginal bleeding for the couple of weeks.  Has not resumed SA.  Denies bladder issues --has always been a really good drinker/hydrator and therefore has always been a frequent urinator.  Has not noticed any signficant differences in bladder control or habits.  +constipation and hemorrhoids --recently finished her stool softeners.  Has been trying to eat a very clean diet but feels that her bowels have gotten worse?  Has not resumed SA; has used OCPs in the past without issues --will do minipill  Moods good  --denies depression or anxiety issues  Returns to work in mid October      Last PHQ-9 score on record=   PHQ-9 SCORE 10/2/2019   PHQ-9 Total Score 1     ANDRZEJ-7 SCORE 3/29/2018 2019 10/2/2019   Total Score 0 1 1       Pap: 3/29/18 HPV-  Lab Results   Component Value Date    PAP NIL 2018    PAP NIL 2015    PAP NIL 05/10/2012       Delivery complications:  Yes,   Breast feeding:  Yes,   Bladder problems:  No  Bowel problems/hemorrhoids:  Yes, Hemorrhoid   Episiotomy/laceration/incision healed? Yes:   Vaginal flow:  None  [place] Wheeler:  No  Contraception: oral contraceptives  Emotional adjustment:  doing well and happy  Back to work:      12 point review of systems negative other than symptoms noted below.    OBJECTIVE:  Vitals: /68   Pulse 60   Ht 1.753 m (5' 9\")   Wt 62.6 kg (138 lb)   LMP 2018   BMI 20.38 kg/m    BMI= Body mass index is 20.38 kg/m .  General - pleasant female in no acute distress.  Breast -  deferred  Abdomen " - Incision well-healed; STILL SOME FIBROTIC TISSUE NOTED ON SUPERIOR RIGHT SIDE OF INCISION; NO DRAINAGE  Pelvic - EG: normal adult female, BUS: within normal limits, Vagina: well rugated, no discharge, Cervix: no lesions or CMT, Uterus: firm, normal sized and nontender, anteverted in position. Adnexae: no masses or tenderness.  Rectovaginal - deferred.    ASSESSMENT:    ICD-10-CM    1. Encounter for postpartum visit Z39.2    2. Status post  delivery Z98.891    3. OCP (oral contraceptive pills) initiation Z30.011 norethindrone (MICRONOR) 0.35 MG tablet       PLAN:  May resume normal activities without restrictions.  Pap smear was not done today.    Full counseling was provided, and all questions were answered.   Return to clinic in one year for an annual visit.     Patient Instructions   Follow up with your primary care provider for your other medical problems.  Continue self breast exam.  Increase physical activity and exercise.  Usual safety and preventative measures counseling done.  Flu Shot today.  Last pap smear () was normal and negative for the DNA of high risk HPV subtypes.  No pap was obtained this year.  This was discussed with the patient and she agrees with the plan.  May resume all normal activities.  Will start daily minipill for contraception.  Will call when weaning or done breast feeding to switch back to combined OCP.    Natalee Farris MD

## 2019-10-02 ENCOUNTER — PRENATAL OFFICE VISIT (OUTPATIENT)
Dept: OBGYN | Facility: CLINIC | Age: 34
End: 2019-10-02
Payer: COMMERCIAL

## 2019-10-02 VITALS
DIASTOLIC BLOOD PRESSURE: 68 MMHG | BODY MASS INDEX: 20.44 KG/M2 | HEART RATE: 60 BPM | SYSTOLIC BLOOD PRESSURE: 116 MMHG | HEIGHT: 69 IN | WEIGHT: 138 LBS

## 2019-10-02 DIAGNOSIS — Z98.891 STATUS POST CESAREAN DELIVERY: ICD-10-CM

## 2019-10-02 DIAGNOSIS — Z30.011 OCP (ORAL CONTRACEPTIVE PILLS) INITIATION: ICD-10-CM

## 2019-10-02 PROBLEM — Z34.00 SUPERVISION OF NORMAL IUP (INTRAUTERINE PREGNANCY) IN PRIMIGRAVIDA: Status: RESOLVED | Noted: 2019-01-29 | Resolved: 2019-10-02

## 2019-10-02 PROCEDURE — 99207 ZZC POST PARTUM EXAM: CPT | Performed by: OBSTETRICS & GYNECOLOGY

## 2019-10-02 RX ORDER — ACETAMINOPHEN AND CODEINE PHOSPHATE 120; 12 MG/5ML; MG/5ML
0.35 SOLUTION ORAL DAILY
Qty: 90 TABLET | Refills: 3 | Status: SHIPPED | OUTPATIENT
Start: 2019-10-02 | End: 2020-09-09

## 2019-10-02 SDOH — HEALTH STABILITY: MENTAL HEALTH: HOW OFTEN DO YOU HAVE 6 OR MORE DRINKS ON ONE OCCASION?: NEVER

## 2019-10-02 ASSESSMENT — PATIENT HEALTH QUESTIONNAIRE - PHQ9
SUM OF ALL RESPONSES TO PHQ QUESTIONS 1-9: 1
5. POOR APPETITE OR OVEREATING: NOT AT ALL

## 2019-10-02 ASSESSMENT — ANXIETY QUESTIONNAIRES
2. NOT BEING ABLE TO STOP OR CONTROL WORRYING: NOT AT ALL
6. BECOMING EASILY ANNOYED OR IRRITABLE: NOT AT ALL
3. WORRYING TOO MUCH ABOUT DIFFERENT THINGS: NOT AT ALL
GAD7 TOTAL SCORE: 1
7. FEELING AFRAID AS IF SOMETHING AWFUL MIGHT HAPPEN: NOT AT ALL
1. FEELING NERVOUS, ANXIOUS, OR ON EDGE: SEVERAL DAYS
5. BEING SO RESTLESS THAT IT IS HARD TO SIT STILL: NOT AT ALL
IF YOU CHECKED OFF ANY PROBLEMS ON THIS QUESTIONNAIRE, HOW DIFFICULT HAVE THESE PROBLEMS MADE IT FOR YOU TO DO YOUR WORK, TAKE CARE OF THINGS AT HOME, OR GET ALONG WITH OTHER PEOPLE: NOT DIFFICULT AT ALL

## 2019-10-02 ASSESSMENT — MIFFLIN-ST. JEOR: SCORE: 1395.34

## 2019-10-02 NOTE — PATIENT INSTRUCTIONS
Follow up with your primary care provider for your other medical problems.  Continue self breast exam.  Increase physical activity and exercise.  Usual safety and preventative measures counseling done.  Flu Shot today.  Last pap smear (2018) was normal and negative for the DNA of high risk HPV subtypes.  No pap was obtained this year.  This was discussed with the patient and she agrees with the plan.  May resume all normal activities.  Will start daily minipill for contraception.  Will call when weaning or done breast feeding to switch back to combined OCP.

## 2019-10-03 ASSESSMENT — ANXIETY QUESTIONNAIRES: GAD7 TOTAL SCORE: 1

## 2019-10-15 ENCOUNTER — TRANSFERRED RECORDS (OUTPATIENT)
Dept: HEALTH INFORMATION MANAGEMENT | Facility: CLINIC | Age: 34
End: 2019-10-15

## 2020-02-24 ENCOUNTER — HEALTH MAINTENANCE LETTER (OUTPATIENT)
Age: 35
End: 2020-02-24

## 2020-02-29 ENCOUNTER — NURSE TRIAGE (OUTPATIENT)
Dept: NURSING | Facility: CLINIC | Age: 35
End: 2020-02-29

## 2020-02-29 NOTE — TELEPHONE ENCOUNTER
FNA triage call :   Presenting problem : Pt called.  Mild headache  Brain felt cloudy and could find words @ 12 noon 2/28/20 and  fever up to 103 forehead , 12 hours ago .  Currently : T 100 forehead ,  Runny nose , hoarse voice but no sore throat or headache  But , mild cough and  pulse 98 .   Guideline used : confusion - Delirium A    Disposition and recommendations : see provider in 4 hours and will go to [facility] for evaluation .   Caller verbalizes understanding and denies further questions and will call back if further symptoms to triage or questions  . Beronica Saldana RN  - [place] Nurse Advisor     Reason for Disposition    [1] Acting confused (e.g., disoriented, slurred speech) AND [2] brief (now gone)    Additional Information    Negative: [1] Difficult to awaken or acting confused (e.g., disoriented, slurred speech) AND [2] present now AND [3] has diabetes (diabetes mellitus)    Negative: [1] Difficult to awaken or acting confused (e.g., disoriented, slurred speech) AND [2] present now AND [3] new onset    Negative: [1] Weakness of the face, arm, or leg on one side of the body AND [2] new onset    Negative: [1] Numbness of the face, arm, or leg on one side of the body AND [2] new onset    Negative: [1] Loss of speech or garbled speech AND [2] new onset    Negative: Difficulty breathing or bluish lips    Negative: Shock suspected (e.g., cold/pale/clammy skin, too weak to stand, low BP, rapid pulse)    Negative: Seeing, hearing, or feeling things that are not there (i.e., visual, auditory, or tactile hallucinations)    Negative: Followed a head injury    Negative: Drug overdose suspected    Negative: Sounds like a life-threatening emergency to the triager    Negative: Alcohol use, abuse or dependence: question or problem related to    Negative: Drug abuse or dependence: question or problem related to    Negative: Headache or vomiting    Negative: Stiff neck (can't touch chin to chest)     Negative: Very strange or paranoid behavior    Negative: Fever > 100.5 F (38.1 C)    Negative: Patient sounds very sick or weak to the triager    Protocols used: CONFUSION - DELIRIUM-A-AH

## 2020-04-26 ENCOUNTER — NURSE TRIAGE (OUTPATIENT)
Dept: NURSING | Facility: CLINIC | Age: 35
End: 2020-04-26

## 2020-04-26 NOTE — TELEPHONE ENCOUNTER
"Patient reports there is a \"huge lump\" in her right breast.  Patient is using hot compresses.  Temp has been 99 F and 100 F (forehead).  Vanesatent says area is hard and reddened area, 3 inch x 3 inch.  Patient reports throbbing pain level of 5-6/10 and it increased to 7/10 when touched.  Patient is breastfeeding and also pumping.  Baby is 8 months old; OB/GYN: Dr. Farris.  Patient reports blocked milk duct and has no milk coming out of right breast.  Patient reports during call, that she was able to pump out some milk.  Reviewed care advice with caller per RN triage protocol.  FNA advised patient should be evaluated within 24 hours.  FNA advised to call back with any concerns.   Caller verbalized understanding and agrees with plan.      COVID 19 Nurse Triage Plan/Patient Instructions    Please be aware that novel coronavirus (COVID-19) may be circulating in the community. If you develop symptoms such as fever, cough, or SOB or if you have concerns about the presence of another infection including coronavirus (COVID-19), please contact your health care provider or visit [email or web address].     Disposition/Instructions    Patient to have scheduled Telephone Visit with a provider. Follow System Ambulatory Workflow for COVID 19.     The clinic staff will assist you to schedule an appointment to complete the Telephone Visit with a provider during normal clinic hours.       Call Back If: Your symptoms worsen before you are able to complete your Telephone Visit with a provider.    Thank you for limiting contact with others, wearing a simple mask to cover your cough, practice good hand hygiene habits and accessing our virtual services where possible to limit the spread of this virus.    For more information about COVID19 and options for caring for yourself at home, please visit the CDC website at https://www.cdc.gov/coronavirus/2019-ncov/about/steps-when-sick.html  For more options for care at [facility], please visit our " website at [email or web address]    For more information, please use the [place] Department of Health COVID-19 Website: https://www.health.[facility].[place]/diseases/coronavirus/index.html  [place] Department of Health ([facility]) COVID-19 Hotlines (Interpreters available):      Health questions: Phone Number: [phone] or 1-777.949.6276 and Hours: 7 a.m. to 7 p.m.    Schools and  questions: Phone Number: [phone] or 1-787.968.5433 and Hours 7 a.m. to 7 p.m.                          Reason for Disposition    [1] Breast looks infected (e.g, spreading redness) AND [2] no fever    Additional Information    Negative: Baby is difficult to awaken or keep awake (Exception: baby needs normal sleep)    Negative: [1] Baby < 1 year old AND [2] very weak (doesn't move or make eye contact)    Negative: Sounds like a life-threatening emergency to the triager    Negative: [1] Baby < 12 weeks old AND [2] fever  > 100.4 F (38.0 C) rectally    Negative: Baby has suspected dehydration (e.g., no urine > 8 hours, brick dust urine 3 or more times, sunken soft spot, very dry mouth) (Exception: no urine > 12 hours on day 2 of life OR > 8 hours on day 3-4 of life and without other signs of dehydration)    Negative: [1] Baby day 2 of life AND [2] no urine > 12 hours AND [3] after given supplemental feeding    Negative: [1] Baby day 3-4 of life AND [2] no urine > 8 hours AND [3] after given supplemental feeding    Negative: [1] Baby < 1 month old ()  AND [2] starts to look or act sick in any way    Negative: [1] Baby < 1 month old AND [2] refuses to breastfeed AND [3] for > 6 hours    Negative: [1] Baby < 12 months AND [2] weak suck / weak muscles AND [3] new onset    Negative: Baby sounds very sick to the triager    Negative: Patient (mother) sounds very sick or weak to the triager    Negative: Baby is formula fed    Negative: Baby spitting up is the main concern    Negative: Baby jaundice up is the  main concern    Negative: [1] Baby refuses to drink anything (including supplemental formula or expressed breastmilk) AND [2] for > 8 hours    Negative: [1] Breast looks infected (e.g, spreading redness, feels hot or painful to touch) AND [2] fever > 100.4 F (38.0 C)    Negative: [1] Baby day 2 of life AND [2] no urine > 12 hours    Negative: [1] Baby day 3 or 4 of life AND [2] no urine > 8 hours    Negative: [1] Baby day 2 of life AND [2] requires supplemental feeding to urinate every 12 hours    Negative: [1] Baby day 2 or 3 of life AND [2] no stool in 24 hours AND [3] exclusively     Negative: [1] Baby day 3 or 4 of life AND [2] requires supplemental feeding to urinate every 8 hours    Negative: [1] Baby day 4 of life or later AND [2] bowel movements are < 3 per day (should be yellow-colored by day 5) (Exception: normal infrequent stools after 4 weeks)    Negative: Wet diapers are < 6 per day  (Exception:  can be normal while milk volume is increasing during 1- 4 days of life)    Negative: [1] Baby < 1 month old AND [2] seems hungry after feedings (cries after nursing OR wants to feed over 12 times/day)    Negative: [1] Baby < 1 month old AND [2] needs to be repeatedly awakened for feedings (every 3 hours during the day) BUT [3] alert and feeds well when awakened    Blocked milk ducts (tender lumps in the breast), questions about    Protocols used: POSTPARTUM - BREASTFEEDING [id number]

## 2020-09-09 DIAGNOSIS — Z30.011 OCP (ORAL CONTRACEPTIVE PILLS) INITIATION: ICD-10-CM

## 2020-09-09 RX ORDER — ACETAMINOPHEN AND CODEINE PHOSPHATE 120; 12 MG/5ML; MG/5ML
0.35 SOLUTION ORAL DAILY
Qty: 90 TABLET | Refills: 0 | Status: SHIPPED | OUTPATIENT
Start: 2020-09-09 | End: 2020-11-10

## 2020-09-09 NOTE — TELEPHONE ENCOUNTER
"Requested Prescriptions   Pending Prescriptions Disp Refills     norethindrone (MICRONOR) 0.35 MG tablet 90 tablet 3     Sig: Take 1 tablet (0.35 mg) by mouth daily       Contraceptives Protocol Passed - 9/9/2020 11:19 AM        Passed - Patient is not a current smoker if age is 35 or older        Passed - Recent (12 mo) or future (30 days) visit within the authorizing provider's specialty     Patient has had an office visit with the authorizing provider or a provider within the authorizing providers department within the previous 12 mos or has a future within next 30 days. See \"Patient Info\" tab in inbasket, or \"Choose Columns\" in Meds & Orders section of the refill encounter.              Passed - Medication is active on med list        Passed - No active pregnancy on record        Passed - No positive pregnancy test in past 12 months           Last Written Prescription Date:  10/2/19  Last Fill Quantity: 90,  # refills: 3   Last office visit: 9/20/2019 with prescribing provider:  Brenton   Future Office Visit:  none          "

## 2020-09-09 NOTE — TELEPHONE ENCOUNTER
Medication is being filled for 1 time refill only due to:  Patient needs to be seen because due for annual. .  Erin Dunn RN on 9/9/2020 at 12:05 PM

## 2020-11-09 PROBLEM — Z98.891 STATUS POST CESAREAN DELIVERY: Status: RESOLVED | Noted: 2019-08-23 | Resolved: 2020-11-09

## 2020-11-10 ENCOUNTER — OFFICE VISIT (OUTPATIENT)
Dept: OBGYN | Facility: CLINIC | Age: 35
End: 2020-11-10
Payer: COMMERCIAL

## 2020-11-10 VITALS
SYSTOLIC BLOOD PRESSURE: 118 MMHG | HEIGHT: 69 IN | WEIGHT: 127 LBS | DIASTOLIC BLOOD PRESSURE: 78 MMHG | BODY MASS INDEX: 18.81 KG/M2 | HEART RATE: 74 BPM

## 2020-11-10 DIAGNOSIS — Z01.419 ENCOUNTER FOR GYNECOLOGICAL EXAMINATION WITHOUT ABNORMAL FINDING: Primary | ICD-10-CM

## 2020-11-10 DIAGNOSIS — Z13.6 ENCOUNTER FOR LIPID SCREENING FOR CARDIOVASCULAR DISEASE: ICD-10-CM

## 2020-11-10 DIAGNOSIS — Z23 NEED FOR PROPHYLACTIC VACCINATION AND INOCULATION AGAINST INFLUENZA: ICD-10-CM

## 2020-11-10 DIAGNOSIS — Z13.1 SCREENING FOR DIABETES MELLITUS: ICD-10-CM

## 2020-11-10 DIAGNOSIS — Z13.220 ENCOUNTER FOR LIPID SCREENING FOR CARDIOVASCULAR DISEASE: ICD-10-CM

## 2020-11-10 DIAGNOSIS — Z30.011 OCP (ORAL CONTRACEPTIVE PILLS) INITIATION: ICD-10-CM

## 2020-11-10 PROCEDURE — 90686 IIV4 VACC NO PRSV 0.5 ML IM: CPT | Performed by: OBSTETRICS & GYNECOLOGY

## 2020-11-10 PROCEDURE — 90471 IMMUNIZATION ADMIN: CPT | Performed by: OBSTETRICS & GYNECOLOGY

## 2020-11-10 PROCEDURE — 99395 PREV VISIT EST AGE 18-39: CPT | Mod: 25 | Performed by: OBSTETRICS & GYNECOLOGY

## 2020-11-10 RX ORDER — ACETAMINOPHEN AND CODEINE PHOSPHATE 120; 12 MG/5ML; MG/5ML
0.35 SOLUTION ORAL DAILY
Qty: 90 TABLET | Refills: 0 | Status: SHIPPED | OUTPATIENT
Start: 2020-11-10 | End: 2020-11-10

## 2020-11-10 RX ORDER — ACETAMINOPHEN AND CODEINE PHOSPHATE 120; 12 MG/5ML; MG/5ML
0.35 SOLUTION ORAL DAILY
Qty: 90 TABLET | Refills: 3 | Status: SHIPPED | OUTPATIENT
Start: 2020-11-10 | End: 2021-11-12

## 2020-11-10 SDOH — HEALTH STABILITY: MENTAL HEALTH: HOW OFTEN DO YOU HAVE A DRINK CONTAINING ALCOHOL?: 2-4 TIMES A MONTH

## 2020-11-10 SDOH — HEALTH STABILITY: MENTAL HEALTH: HOW MANY STANDARD DRINKS CONTAINING ALCOHOL DO YOU HAVE ON A TYPICAL DAY?: 1 OR 2

## 2020-11-10 ASSESSMENT — ANXIETY QUESTIONNAIRES
GAD7 TOTAL SCORE: 4
6. BECOMING EASILY ANNOYED OR IRRITABLE: NOT AT ALL
5. BEING SO RESTLESS THAT IT IS HARD TO SIT STILL: NOT AT ALL
7. FEELING AFRAID AS IF SOMETHING AWFUL MIGHT HAPPEN: NOT AT ALL
1. FEELING NERVOUS, ANXIOUS, OR ON EDGE: SEVERAL DAYS
3. WORRYING TOO MUCH ABOUT DIFFERENT THINGS: SEVERAL DAYS
2. NOT BEING ABLE TO STOP OR CONTROL WORRYING: SEVERAL DAYS
IF YOU CHECKED OFF ANY PROBLEMS ON THIS QUESTIONNAIRE, HOW DIFFICULT HAVE THESE PROBLEMS MADE IT FOR YOU TO DO YOUR WORK, TAKE CARE OF THINGS AT HOME, OR GET ALONG WITH OTHER PEOPLE: NOT DIFFICULT AT ALL

## 2020-11-10 ASSESSMENT — MIFFLIN-ST. JEOR: SCORE: 1340.45

## 2020-11-10 ASSESSMENT — PATIENT HEALTH QUESTIONNAIRE - PHQ9
SUM OF ALL RESPONSES TO PHQ QUESTIONS 1-9: 2
5. POOR APPETITE OR OVEREATING: SEVERAL DAYS

## 2020-11-10 NOTE — PATIENT INSTRUCTIONS
Return to clinic for screening Lipids and Fasting Blood sugar for biometric screening.  Follow up with your primary care provider for your other medical problems.  Continue self breast exam.  Increase physical activity and exercise.  Lab results will be called to the patient and biometric forms completed and returned.  Usual safety and preventative measures counseling done.  Last pap smear (2018) was normal and negative for the DNA of high risk HPV subtypes.  No pap was obtained this year.  This was discussed with the patient and she agrees with the plan.

## 2020-11-10 NOTE — PROGRESS NOTES
Debbi is a 34 year old  female who presents for annual exam.     Besides routine health maintenance,  she would like to discuss having another baby  .    HPI:  Here today for yearly exam --doing well.  Amenorrheic.  Still nearly exclusively breast feeding and doing minipill for contraception.  Considering TTC but unsure about timing.  Wondering about safety.  +SA --has had some dryness while breast feeding.  Denies bowel/bladder issues.  No voiding issues -feels that she has same capacity; no leaking or incontinence issues.     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 (Menstrual status: Breast Feeding).    Regular menses? no  Menses every NA days.  Length of menses: NA days    Her current contraception method is: none.  She  reports that she has never smoked. She has never used smokeless tobacco.    Patient is sexually active.  STD testing offered?  Declined  Last PHQ-9 score on record =   PHQ-9 SCORE 11/10/2020   PHQ-9 Total Score 2     Last GAD7 score on record =   ANDRZEJ-7 SCORE 11/10/2020   Total Score 4     Alcohol Score = 2    HEALTH MAINTENANCE:  Cholesterol:   Recent Labs   Lab Test 18  0824 08/04/15  0856   CHOL 190 210*   HDL 93 80   LDL 89 117   TRIG 41 65   CHOLHDLRATIO  --  2.6        Last Mammo: Not applicable, Result: Not applicable, Next Mammo: Due at age 40   Pap: (  Lab Results   Component Value Date    PAP NIL 2018    PAP NIL 2015     PAP NIL 05/10/2012     Colonoscopy:  NEVER, Result: Not applicable, Next Colonoscopy: Due at age 50 years.  Dexa:  NA    Health maintenance updated:  yes    HISTORY:  OB History    Para Term  AB Living   1 1 1 0 0 1   SAB TAB Ectopic Multiple Live Births   0 0 0 0 1      # Outcome Date GA Lbr Kyle/2nd Weight Sex Delivery Anes PTL Lv   1 Term 19 40w0d 17:00 / 04:29 3.7 kg (8 lb 2.5 oz) M CS-LTranv EPI N BETINA      Complications: Failure to Progress in Second Stage, Cephalopelvic Disproportion, Paroxysmal atrial fibrillation (H)      Name: Kristyn      Apgar1: 8  Apgar5: 9       Patient Active Problem List   Diagnosis     Scoliosis     Inflammatory acne     Comedonal acne     Chronic nasal congestion     Acute seasonal allergic rhinitis, unspecified trigger     Acute blood loss anemia     Past Surgical History:   Procedure Laterality Date      SECTION N/A 2019    Procedure:  SECTION;  Surgeon: Natalee Farirs MD;  Location:  L+D     CYSTOSCOPY N/A 2019    Procedure: CYSTOSCOPY;  Surgeon: Natalee Farris MD;  Location:  OR     CYSTOSCOPY, CYSTOGRAM, COMBINED N/A 2019    Procedure: Cystoscopy, cystogram, combined;  Surgeon: Pedro Luis Mireles MD;  Location:  OR     CYSTOSCOPY, RETROGRADES, INSERT STENT URETER(S), COMBINED  2019    Procedure: CYSTOSCOPY WITH BILATERAL RETROGRADES/ BILATERAL STENT PLACEMENT AND REMOVAL/ CYSTOGRAM/ BLADDER REPAIR;  Surgeon: Pedro Luis Mireles MD;  Location:  OR      REMOVE TONSILS/ADENOIDS,<13 Y/O       HC TOOTH EXTRACTION W/FORCEP      Ider Teeth     LAPAROTOMY EXPLORATORY N/A 2019    Procedure: EXPLORATORY LAPAROTOMY WITH REPEAT EXPLORATORY LAPAROTOMY WITH BLADDER REPAIR;  Surgeon: Natalee Farris MD;  Location:  OR     REPAIR BLADDER N/A 2019    Procedure: REPAIR, BLADDER;  Surgeon: Natalee Farris MD;  Location:  OR      Social History     Tobacco Use     Smoking status: Never  "Smoker     Smokeless tobacco: Never Used   Substance Use Topics     Alcohol use: Yes     Frequency: 2-4 times a month     Drinks per session: 1 or 2     Binge frequency: Never     Comment: Less than once a month      Problem (# of Occurrences) Relation (Name,Age of Onset)    Cancer - colorectal (1) Maternal Grandfather    Depression (1) Brother: bipolar depression    Diabetes (2) Father, Maternal Grandfather    Hypertension (1) Father    Neurologic Disorder (2) Brother: epliepsy, Sister    Prostate Cancer (1) Maternal Grandfather    Psychotic Disorder (1) Maternal Grandmother            Current Outpatient Medications   Medication Sig     Docosahexaenoic Acid (DHA PO)      norethindrone (MICRONOR) 0.35 MG tablet Take 1 tablet (0.35 mg) by mouth daily .  Appointment needed for additional refills.     Prenatal Multivit-Min-Fe-FA (PRENATAL VITAMINS PO) Take 1 tablet by mouth     senna-docusate (SENOKOT-S/PERICOLACE) 8.6-50 MG tablet Take 1 tablet by mouth 2 times daily as needed for constipation     benzoyl peroxide-erythromycin (BENZAMYCIN) topical gel Apply twice daily (Patient not taking: Reported on 10/2/2019)     No current facility-administered medications for this visit.      Allergies   Allergen Reactions     Seasonal Allergies        Past medical, surgical, social and family histories were reviewed and updated in EPIC.    ROS:   12 point review of systems negative other than symptoms noted below or in the HPI.  No urinary frequency or dysuria, bladder or kidney problems    EXAM:  /78   Pulse 74   Ht 1.753 m (5' 9\")   Wt 57.6 kg (127 lb)   BMI 18.75 kg/m     BMI: Body mass index is 18.75 kg/m .    PHYSICAL EXAM:  Constitutional:   Appearance: Well nourished, well developed, alert, in no acute distress  Neck:  Lymph Nodes:  No lymphadenopathy present    Thyroid:  Gland size normal, nontender, no nodules or masses present  on palpation  Chest:  Respiratory Effort:  Breathing " unlabored  Cardiovascular:    Heart: Auscultation:  Regular rate, normal rhythm, no murmurs present  Breasts: Inspection of Breasts:  No lymphadenopathy present., Palpation of Breasts and Axillae:  No masses present on palpation, no breast tenderness., Axillary Lymph Nodes:  No lymphadenopathy present. and No nodularity, asymmetry or nipple discharge bilaterally.  Gastrointestinal:   Abdominal Examination:  Abdomen nontender to palpation, tone normal without rigidity or guarding, no masses present, umbilicus without lesions   Liver and Spleen:  No hepatomegaly present, liver nontender to palpation    Hernias:  No hernias present  Lymphatic: Lymph Nodes:  No other lymphadenopathy present  Skin:  General Inspection:  No rashes present, no lesions present, no areas of  discoloration  Neurologic:    Mental Status:  Oriented X3.  Normal strength and tone, sensory exam                grossly normal, mentation intact and speech normal.    Psychiatric:   Mentation appears normal and affect normal/bright.         Pelvic Exam:  External Genitalia:     Normal appearance for age, no discharge present, no tenderness present, no inflammatory lesions present, color normal  Vagina:     Normal vaginal vault without central or paravaginal defects, no discharge present, no inflammatory lesions present, no masses present  Bladder:     Nontender to palpation  Urethra:   Urethral Body:  Urethra palpation normal, urethra structural support normal   Urethral Meatus:  No erythema or lesions present  Cervix:     Appearance healthy, no lesions present, nontender to palpation, no bleeding present  Uterus:     Uterus: firm, normal sized and nontender, midplane in position.   Adnexa:     No adnexal tenderness present, no adnexal masses present  Perineum:     Perineum within normal limits, no evidence of trauma, no rashes or skin lesions present  Anus:     Anus within normal limits, no hemorrhoids present  Inguinal Lymph Nodes:     No  lymphadenopathy present  Pubic Hair:     Normal pubic hair distribution for age  Genitalia and Groin:     No rashes present, no lesions present, no areas of discoloration, no masses present      COUNSELING:   Reviewed preventive health counseling, as reflected in patient instructions  Special attention given to:        Regular exercise       Healthy diet/nutrition       Contraception       Family planning    BMI: Body mass index is 18.75 kg/m .      ASSESSMENT:  34 year old female with satisfactory annual exam.    ICD-10-CM    1. Encounter for gynecological examination without abnormal finding  Z01.419    2. OCP (oral contraceptive pills) initiation  Z30.011 norethindrone (MICRONOR) 0.35 MG tablet     DISCONTINUED: norethindrone (MICRONOR) 0.35 MG tablet   3. Need for prophylactic vaccination and inoculation against influenza  Z23 INFLUENZA VACCINE IM > 6 MONTHS VALENT IIV4 [72078]   4. Encounter for lipid screening for cardiovascular disease  Z13.220 Lipid panel reflex to direct LDL Fasting    Z13.6    5. Screening for diabetes mellitus  Z13.1 Glucose whole blood       PLAN:  Patient Instructions   Return to clinic for screening Lipids and Fasting Blood sugar for biometric screening.  Follow up with your primary care provider for your other medical problems.  Continue self breast exam.  Increase physical activity and exercise.  Lab results will be called to the patient and biometric forms completed and returned.  Usual safety and preventative measures counseling done.  Last pap smear (2018) was normal and negative for the DNA of high risk HPV subtypes.  No pap was obtained this year.  This was discussed with the patient and she agrees with the plan.        Natalee Farris MD

## 2020-11-11 ASSESSMENT — ANXIETY QUESTIONNAIRES: GAD7 TOTAL SCORE: 4

## 2020-11-18 DIAGNOSIS — Z13.220 ENCOUNTER FOR LIPID SCREENING FOR CARDIOVASCULAR DISEASE: ICD-10-CM

## 2020-11-18 DIAGNOSIS — Z13.6 ENCOUNTER FOR LIPID SCREENING FOR CARDIOVASCULAR DISEASE: ICD-10-CM

## 2020-11-18 DIAGNOSIS — Z13.1 SCREENING FOR DIABETES MELLITUS: ICD-10-CM

## 2020-11-18 LAB
CHOLEST SERPL-MCNC: 215 MG/DL
GLUCOSE BLD-MCNC: 92 MG/DL (ref 70–99)
HDLC SERPL-MCNC: 101 MG/DL
LDLC SERPL CALC-MCNC: 108 MG/DL
NONHDLC SERPL-MCNC: 114 MG/DL
TRIGL SERPL-MCNC: 31 MG/DL

## 2020-11-18 PROCEDURE — 82947 ASSAY GLUCOSE BLOOD QUANT: CPT | Performed by: OBSTETRICS & GYNECOLOGY

## 2020-11-18 PROCEDURE — 80061 LIPID PANEL: CPT | Performed by: OBSTETRICS & GYNECOLOGY

## 2021-09-26 ENCOUNTER — HEALTH MAINTENANCE LETTER (OUTPATIENT)
Age: 36
End: 2021-09-26

## 2021-11-11 PROBLEM — Z30.41 USES ORAL CONTRACEPTION: Status: ACTIVE | Noted: 2021-11-11

## 2021-11-12 ENCOUNTER — OFFICE VISIT (OUTPATIENT)
Dept: OBGYN | Facility: CLINIC | Age: 36
End: 2021-11-12
Payer: COMMERCIAL

## 2021-11-12 VITALS
BODY MASS INDEX: 17.47 KG/M2 | DIASTOLIC BLOOD PRESSURE: 62 MMHG | SYSTOLIC BLOOD PRESSURE: 110 MMHG | HEIGHT: 70 IN | WEIGHT: 122 LBS

## 2021-11-12 DIAGNOSIS — Z13.220 ENCOUNTER FOR LIPID SCREENING FOR CARDIOVASCULAR DISEASE: ICD-10-CM

## 2021-11-12 DIAGNOSIS — Z01.419 ENCOUNTER FOR GYNECOLOGICAL EXAMINATION WITHOUT ABNORMAL FINDING: Primary | ICD-10-CM

## 2021-11-12 DIAGNOSIS — Z13.6 ENCOUNTER FOR LIPID SCREENING FOR CARDIOVASCULAR DISEASE: ICD-10-CM

## 2021-11-12 DIAGNOSIS — Z13.1 SCREENING FOR DIABETES MELLITUS: ICD-10-CM

## 2021-11-12 LAB — GLUCOSE BLD-MCNC: 85 MG/DL (ref 60–99)

## 2021-11-12 PROCEDURE — 36415 COLL VENOUS BLD VENIPUNCTURE: CPT | Performed by: OBSTETRICS & GYNECOLOGY

## 2021-11-12 PROCEDURE — 99395 PREV VISIT EST AGE 18-39: CPT | Performed by: OBSTETRICS & GYNECOLOGY

## 2021-11-12 PROCEDURE — 80061 LIPID PANEL: CPT | Performed by: OBSTETRICS & GYNECOLOGY

## 2021-11-12 PROCEDURE — 82947 ASSAY GLUCOSE BLOOD QUANT: CPT | Performed by: OBSTETRICS & GYNECOLOGY

## 2021-11-12 ASSESSMENT — ANXIETY QUESTIONNAIRES
3. WORRYING TOO MUCH ABOUT DIFFERENT THINGS: NOT AT ALL
1. FEELING NERVOUS, ANXIOUS, OR ON EDGE: SEVERAL DAYS
GAD7 TOTAL SCORE: 1
6. BECOMING EASILY ANNOYED OR IRRITABLE: NOT AT ALL
2. NOT BEING ABLE TO STOP OR CONTROL WORRYING: NOT AT ALL
7. FEELING AFRAID AS IF SOMETHING AWFUL MIGHT HAPPEN: NOT AT ALL
IF YOU CHECKED OFF ANY PROBLEMS ON THIS QUESTIONNAIRE, HOW DIFFICULT HAVE THESE PROBLEMS MADE IT FOR YOU TO DO YOUR WORK, TAKE CARE OF THINGS AT HOME, OR GET ALONG WITH OTHER PEOPLE: NOT DIFFICULT AT ALL
5. BEING SO RESTLESS THAT IT IS HARD TO SIT STILL: NOT AT ALL

## 2021-11-12 ASSESSMENT — PATIENT HEALTH QUESTIONNAIRE - PHQ9: 5. POOR APPETITE OR OVEREATING: NOT AT ALL

## 2021-11-12 ASSESSMENT — MIFFLIN-ST. JEOR: SCORE: 1328.64

## 2021-11-12 NOTE — PROGRESS NOTES
Debbi is a 35 year old  female who presents for annual exam.     Besides routine health maintenance, she has no other health concerns today .    HPI:  Here today for yearly exam --doing well.  Still no periods since delivery in 2019.  Stopped her minipill one year ago but still breast feeding 3-5x/day.  Hx of infrequent menses --was not uncommon to have periods every 2-3 months.  +SA --no contraception.  No pain or discomfort.  No bowel/bladder issues.     ; working for [employer] --still working remote; son Kristyn is 28mo!!!  -staying very active; running daily, +core work and occ strength training --was surprised with her weight today!  Usually runs ~130#  +SBE --still breast feeding 3-5x/day  No PCP --no other medical issues  -completed covid vaccine series and had flu shot      GYNECOLOGIC HISTORY:    No LMP recorded. (Menstrual status: Breast Feeding).    Regular menses? No, breastfeeding    Her current contraception method is: none.  She  reports that she has never smoked. She has never used smokeless tobacco.    Patient is sexually active.  STD testing offered?  Declined  Last PHQ-9 score on record =   PHQ-9 SCORE 2021   PHQ-9 Total Score 1     Last GAD7 score on record =   ANDRZEJ-7 SCORE 2021   Total Score 1     Alcohol Score = 1    HEALTH MAINTENANCE:  Cholesterol:   Cholesterol   Date Value Ref Range Status   2020 215 (H) <200 mg/dL Final     Comment:     Desirable:       <200 mg/dl   2018 190 <200 mg/dL Final    Last Mammo: Not applicable, Result: Not applicable, Next Mammo: Due at age 40   Pap:   Lab Results   Component Value Date    PAP NIL 2018    PAP NIL 2015    PAP NIL 05/10/2012   3/29/18 WNL HPV (-)neg  Colonoscopy:  NA, Result: Not applicable, Next Colonoscopy: NA years.  Dexa:  NA    Health maintenance updated:  yes    HISTORY:  OB History    Para Term  AB Living   1 1 1 0 0 1   SAB IAB Ectopic Multiple Live Births   0 0 0 0 1      #  Outcome Date GA Lbr Kyle/2nd Weight Sex Delivery Anes PTL Lv   1 Term 19 40w0d 17:00 / 04:29 3.7 kg (8 lb 2.5 oz) M CS-LTranv EPI N BETINA      Complications: Failure to Progress in Second Stage, Cephalopelvic Disproportion, Paroxysmal atrial fibrillation (H)      Name: Kristyn      Apgar1: 8  Apgar5: 9       Patient Active Problem List   Diagnosis     Scoliosis     Inflammatory acne     Comedonal acne     Chronic nasal congestion     Acute seasonal allergic rhinitis, unspecified trigger     Acute blood loss anemia     Uses oral contraception     Past Surgical History:   Procedure Laterality Date      SECTION N/A 2019    Procedure:  SECTION;  Surgeon: Natalee Farris MD;  Location:  L+D     CYSTOSCOPY N/A 2019    Procedure: CYSTOSCOPY;  Surgeon: Natalee Farris MD;  Location:  OR     CYSTOSCOPY, CYSTOGRAM, COMBINED N/A 2019    Procedure: Cystoscopy, cystogram, combined;  Surgeon: Pedro Luis Mireles MD;  Location:  OR     CYSTOSCOPY, RETROGRADES, INSERT STENT URETER(S), COMBINED  2019    Procedure: CYSTOSCOPY WITH BILATERAL RETROGRADES/ BILATERAL STENT PLACEMENT AND REMOVAL/ CYSTOGRAM/ BLADDER REPAIR;  Surgeon: Pedro Luis Mireles MD;  Location:  OR      REMOVE TONSILS/ADENOIDS,<13 Y/O       HC TOOTH EXTRACTION W/FORCEP      [place] [facility]     LAPAROTOMY EXPLORATORY N/A 2019    Procedure: EXPLORATORY LAPAROTOMY WITH REPEAT EXPLORATORY LAPAROTOMY WITH BLADDER REPAIR;  Surgeon: Natalee Farris MD;  Location:  OR     REPAIR BLADDER N/A 2019    Procedure: REPAIR, BLADDER;  Surgeon: Natalee Farris MD;  Location:  OR      Social History     Tobacco Use     Smoking status: Never Smoker     Smokeless tobacco: Never Used   Substance Use Topics     Alcohol use: Yes     Comment: Less than once a month      Problem (# of Occurrences) Relation (Name,Age of Onset)    Cancer - colorectal (1) Maternal Grandfather    Depression (1) Brother: bipolar  "depression    Diabetes (2) Father, Maternal Grandfather    Hypertension (1) Father    Neurologic Disorder (2) Sister, Brother: epliepsy    No Known Problems (4) Mother, Paternal Grandmother, Paternal Grandfather, Other    Prostate Cancer (1) Maternal Grandfather    Psychotic Disorder (1) Maternal Grandmother            Current Outpatient Medications   Medication Sig     benzoyl peroxide-erythromycin (BENZAMYCIN) topical gel Apply twice daily     Docosahexaenoic Acid (DHA PO)      Prenatal Multivit-Min-Fe-FA (PRENATAL VITAMINS PO) Take 1 tablet by mouth     senna-docusate (SENOKOT-S/PERICOLACE) 8.6-50 MG tablet Take 1 tablet by mouth 2 times daily as needed for constipation     No current facility-administered medications for this visit.     Allergies   Allergen Reactions     Seasonal Allergies        Past medical, surgical, social and family histories were reviewed and updated in EPIC.    ROS:   12 point review of systems negative other than symptoms noted below or in the HPI.  No urinary frequency or dysuria, bladder or kidney problems    EXAM:  /62   Ht 1.778 m (5' 10\")   Wt 55.3 kg (122 lb)   Breastfeeding Yes   BMI 17.51 kg/m     BMI: Body mass index is 17.51 kg/m .    PHYSICAL EXAM:  Constitutional:   Appearance: Well nourished, well developed, alert, in no acute distress  Neck:  Lymph Nodes:  No lymphadenopathy present    Thyroid:  Gland size normal, nontender, no nodules or masses present  on palpation  Chest:  Respiratory Effort:  Breathing unlabored  Cardiovascular:    Heart: Auscultation:  Regular rate, normal rhythm, no murmurs present  Breasts: Inspection of Breasts:  No lymphadenopathy present., Palpation of Breasts and Axillae:  No masses present on palpation, no breast tenderness., Axillary Lymph Nodes:  No lymphadenopathy present. and No nodularity, asymmetry or nipple discharge bilaterally.  Gastrointestinal:   Abdominal Examination:  Abdomen nontender to palpation, tone normal without " rigidity or guarding, no masses present, umbilicus without lesions   Liver and Spleen:  No hepatomegaly present, liver nontender to palpation    Hernias:  No hernias present  Lymphatic: Lymph Nodes:  No other lymphadenopathy present  Skin:  General Inspection:  No rashes present, no lesions present, no areas of  discoloration  Neurologic:    Mental Status:  Oriented X3.  Normal strength and tone, sensory exam                grossly normal, mentation intact and speech normal.    Psychiatric:   Mentation appears normal and affect normal/bright.         Pelvic Exam:  External Genitalia:     Normal appearance for age, no discharge present, no tenderness present, no inflammatory lesions present, color normal  Vagina:     Normal vaginal vault without central or paravaginal defects, no discharge present, no inflammatory lesions present, no masses present  Bladder:     Nontender to palpation  Urethra:   Urethral Body:  Urethra palpation normal, urethra structural support normal   Urethral Meatus:  No erythema or lesions present  Cervix:     Appearance healthy, no lesions present, nontender to palpation, no bleeding present  Uterus:     Uterus: firm, normal sized and nontender, anteverted in position.   Adnexa:     No adnexal tenderness present, no adnexal masses present  Perineum:     Perineum within normal limits, no evidence of trauma, no rashes or skin lesions present  Anus:     Anus within normal limits, no hemorrhoids present  Inguinal Lymph Nodes:     No lymphadenopathy present  Pubic Hair:     Normal pubic hair distribution for age  Genitalia and Groin:     No rashes present, no lesions present, no areas of discoloration, no masses present      COUNSELING:   Reviewed preventive health counseling, as reflected in patient instructions  Special attention given to:        Regular exercise       Healthy diet/nutrition       Family planning       Folic Acid    BMI: Body mass index is 17.51 kg/m .      ASSESSMENT:  35 year  old female with satisfactory annual exam.    ICD-10-CM    1. Encounter for gynecological examination without abnormal finding  Z01.419    2. Encounter for lipid screening for cardiovascular disease  Z13.220 Lipid panel reflex to direct LDL Fasting    Z13.6 Lipid panel reflex to direct LDL Fasting   3. Screening for diabetes mellitus  Z13.1 Glucose, whole blood     Glucose, whole blood       PLAN:  Patient Instructions   Follow up with your primary care provider for your other medical problems.  Continue self breast exam.  Increase physical activity and exercise.  Usual safety and preventative measures counseling done.  Weight gain encouraged.  Last pap smear (2018) was normal and negative for the DNA of high risk HPV subtypes.  No pap was obtained this year.  This was discussed with the patient and she agrees with the plan.  Will work on weaning breast feeding and weight gain in hopes of stimulating return of menses.  If still no period after breast feeding weaned, will try 10d course of progesterone to stimulate withdrawal period.      Natalee Farris MD

## 2021-11-12 NOTE — PATIENT INSTRUCTIONS
Follow up with your primary care provider for your other medical problems.  Continue self breast exam.  Increase physical activity and exercise.  Usual safety and preventative measures counseling done.  Weight gain encouraged.  Last pap smear (2018) was normal and negative for the DNA of high risk HPV subtypes.  No pap was obtained this year.  This was discussed with the patient and she agrees with the plan.  Will work on weaning breast feeding and weight gain in hopes of stimulating return of menses.  If still no period after breast feeding weaned, will try 10d course of progesterone to stimulate withdrawal period.

## 2021-11-13 LAB
CHOLEST SERPL-MCNC: 231 MG/DL
FASTING STATUS PATIENT QL REPORTED: YES
HDLC SERPL-MCNC: 125 MG/DL
LDLC SERPL CALC-MCNC: 97 MG/DL
NONHDLC SERPL-MCNC: 106 MG/DL
TRIGL SERPL-MCNC: 45 MG/DL

## 2021-11-13 ASSESSMENT — ANXIETY QUESTIONNAIRES: GAD7 TOTAL SCORE: 1

## 2021-11-13 ASSESSMENT — PATIENT HEALTH QUESTIONNAIRE - PHQ9: SUM OF ALL RESPONSES TO PHQ QUESTIONS 1-9: 1

## 2021-11-23 ENCOUNTER — MYC MEDICAL ADVICE (OUTPATIENT)
Dept: OBGYN | Facility: CLINIC | Age: 36
End: 2021-11-23
Payer: COMMERCIAL

## 2022-05-18 ENCOUNTER — PRENATAL OFFICE VISIT (OUTPATIENT)
Dept: NURSING | Facility: CLINIC | Age: 37
End: 2022-05-18
Payer: COMMERCIAL

## 2022-05-18 DIAGNOSIS — O36.80X0 PREGNANCY WITH INCONCLUSIVE FETAL VIABILITY: Primary | ICD-10-CM

## 2022-05-18 DIAGNOSIS — O09.91 SUPERVISION OF HIGH RISK PREGNANCY IN FIRST TRIMESTER: ICD-10-CM

## 2022-05-18 DIAGNOSIS — Z13.79 GENETIC SCREENING: ICD-10-CM

## 2022-05-18 DIAGNOSIS — O09.529 AMA (ADVANCED MATERNAL AGE) MULTIGRAVIDA 35+: ICD-10-CM

## 2022-05-18 DIAGNOSIS — I48.0 PAF (PAROXYSMAL ATRIAL FIBRILLATION) (H): ICD-10-CM

## 2022-05-18 PROBLEM — O09.90 SUPERVISION OF HIGH-RISK PREGNANCY: Status: ACTIVE | Noted: 2022-05-18

## 2022-05-18 PROCEDURE — 99207 PR NO CHARGE NURSE ONLY: CPT

## 2022-05-18 RX ORDER — AMOXICILLIN 250 MG
1 CAPSULE ORAL DAILY
COMMUNITY
End: 2023-02-17

## 2022-05-18 NOTE — PROGRESS NOTES
SUBJECTIVE:     HPI:    This is a 36 year old female patient,  who presents for her first obstetrical visit.    NORMAN: 2022, by Last Menstrual Period.  She is 7w4d weeks.  Her cycles are irregular.  Her last menstrual period was normal.   Since her LMP, she has experienced  fatigue, constipation and constipation).   She denies emesis, fatigue, headache, loss of appetite, vaginal discharge, dysuria, pelvic pain, urinary urgency, lightheadedness, urinary frequency, vaginal bleeding and hemorrhoids.    Additional History: Second Pregnancy  First Delivery complicated by A-Fib and Bladder injury during C/S    Pt still very active, working out every day, noticing increased fatigue with running. Currently is signed up to run a half marathon , wonders if that is a good idea given A-fib with last pregnancy?  States she did run a half marathon during her last pregnancy.  Planning on updating her fitbit/apple watch to further monitor her HR. Pt requesting Cardiology involvement given new pregnancy. Discussed Cardiology and M collaboration    Notes one day so far she was particularly faint. Noted HR to be down in the 60s for a bit and then later in the day was upper 120s. States she did have a big presentation at work that day so pt is not sure if anxiety triggered these weird episodes. Pt will continue to monitor and update clinic with any further incidents.    Still Breastfeeding liza Simons 3-5x/day. Discussed possible weaning due to pregnancy--will discuss further with Dr Farris.    Pt in therapy for ongoing anxiety, states she believes it to be well-managed. Does not increase in anxiety symptoms though given current pregnancy and everything she endured during and after her last birth.    Have you travelled during the pregnancy?No  Have your sexual partner(s) travelled during the pregnancy?No      HISTORY:   Planned Pregnancy: Yes  Marital Status:   Occupation:    Living in  Household: Spouse and Children    Past History:  Her past medical history   Past Medical History:   Diagnosis Date     Acute blood loss anemia 2019     Anxiety     Therapy--primarily due to last delivery and fear around this delivery     CP (cerebral palsy) (H)      History of blood transfusion      PAF (paroxysmal atrial fibrillation) (H)      Scoliosis 05/10/2012     Seasonal allergies      Status post  delivery 2019   .      She has a history of  prior  section and PAF in labor with past pregnancy    Since her last LMP she denies use of alcohol, tobacco and street drugs.    Past medical, surgical, social and family history were reviewed and updated in [facility].      Current Outpatient Medications   Medication     Prenatal Multivit-Min-Fe-FA (PRENATAL VITAMINS PO)     senna-docusate (SENOKOT-S/PERICOLACE) 8.6-50 MG tablet     benzoyl peroxide-erythromycin (BENZAMYCIN) topical gel     Docosahexaenoic Acid (DHA PO)     No current facility-administered medications for this visit.       ROS:   12 point review of systems negative other than symptoms noted below or in the HPI.  Constitutional: Fatigue  Breast: Tenderness and significantly increases with latching [place] to the breast  Gastrointestinal: Constipation      OBJECTIVE:     EXAM:  LMP 2022  There is no height or weight on file to calculate BMI.      ASSESSMENT/PLAN:       ICD-10-CM    1. Supervision of high risk pregnancy in first trimester  O09.91    2. AMA (advanced maternal age) multigravida 35+  O09.529    3. PAF (paroxysmal atrial fibrillation) (H)  I48.0        36 year old , 7w4d weeks of pregnancy with NORMAN of 2022, by Last Menstrual Period    Nurse phone visit completed. Prenatal book and folder (containing standard educational hand-outs and brochures) will be given at next visit to patient. Information in folder reviewed over the phone. Questions answered. Brochure given on optional screening available to  assess chromosomal anomalies. Pt desires NIPS. Pt advised to call the clinic if she has any questions or concerns related to her pregnancy. Prenatal labs future ordered. New prenatal visit scheduled on 6/3 with Brenton Franco RN on 5/18/2022 at 11:27 AM      Lab Results   Component Value Date    PAP NIL 03/29/2018     PHQ-9 score:    PHQ 5/16/2022   PHQ-9 Total Score 3   Q9: Thoughts of better off dead/self-harm past 2 weeks Not at all         ANDRZEJ-7 SCORE 11/10/2020 11/12/2021 5/16/2022   Total Score - - 2 (minimal anxiety)   Total Score 4 1 2       Patient supplied answers from flow sheet for:  Prenatal OB Questionnaire.  Past Medical History  Have you ever recieved care for your mental health? : (!) Yes  Have you ever been in a major accident or suffered serious trauma?: (!) Yes  Within the last year, has anyone hit, slapped, kicked or otherwise hurt you?: No  In the last year, has anyone forced you to have sex when you didn't want to?: No    Past Medical History 2   Have you ever received a blood transfusion?: (!) Yes  Would you accept a blood transfusion if was medically recommended?: Yes  Does anyone in your home smoke?: No   Is your blood type Rh negative?: No  Have you ever ?: (!) Yes  Have you been hospitalized for a nonsurgical reason excluding normal delivery?: No  Have you ever had an abnormal pap smear?: No    Past Medical History (Continued)  Do you have a history of abnormalities of the uterus?: No  Did your mother take KEENAN or any other hormones when she was pregnant with you?: No  Do you have any other problems we have not asked about which you feel may be important to this pregnancy?: (!) Yes

## 2022-06-01 PROBLEM — O34.219 PREVIOUS CESAREAN DELIVERY, ANTEPARTUM: Status: ACTIVE | Noted: 2022-06-01

## 2022-06-02 PROBLEM — Z30.41 USES ORAL CONTRACEPTION: Status: RESOLVED | Noted: 2021-11-11 | Resolved: 2022-06-02

## 2022-06-02 NOTE — PROGRESS NOTES
HPI:     This is a 36 year old female patient,  who presents for her first obstetrical visit.     NORMAN: 2022, by Last Menstrual Period.  She is 7w4d weeks.  Her cycles are irregular.  Her last menstrual period was normal.   Since her LMP, she has experienced  fatigue, constipation and constipation).   She denies emesis, fatigue, headache, loss of appetite, vaginal discharge, dysuria, pelvic pain, urinary urgency, lightheadedness, urinary frequency, vaginal bleeding and hemorrhoids.     Additional History: Second Pregnancy  First Delivery complicated by A-Fib and Bladder injury during C/S     New OB visit --2nd pregnancy.  Sure dating --had 3 very regular menses and conceived using OPKs and timed IC.  US today confirms dating.  NO vb/spotting.   No cramping or abdominal pain.  Mild nausea and lots of fatigue --seems to be slightly better this week.  No bowel issues.  Has noticed more urinary urgency  Prior c/s at time of pushing for fetal intolerance to labor and FTP; sustained bladder injury during  and was taken back to OR with myself and urology.  No bladder issues since.  Interested in scheduled repeat c/s at 39wks  AMA --interested in genetic testing --will return next week for all bloodwork  Hx afib at time of admission with first pregnancy --resolved with adenosine; saw cardiology once after delivery and has not needed any follow up since. Agrees to meet with them to re-establish cares/connection in the event we were to have any issues  -hx anxiety --meeting with therapist/counselor  -had covid vaccines and booster  -still active; was hoping to do [place] half marathon in November which I have advised against with the fact that she will be 33wks; understands limitation with air travel in particular     Have you travelled during the pregnancy?No  Have your sexual partner(s) travelled during the pregnancy?No        HISTORY:   Planned Pregnancy: Yes  Marital Status:   Occupation:     Living in Household: Spouse and Children     Past History:  Her past medical history   Past Medical History        Past Medical History:   Diagnosis Date     Acute blood loss anemia 2019     Anxiety       Therapy--primarily due to last delivery and fear around this delivery     CP (cerebral palsy) (H)       History of blood transfusion       PAF (paroxysmal atrial fibrillation) (H)       Scoliosis 05/10/2012     Seasonal allergies       Status post  delivery 2019      .       She has a history of  prior  section and PAF in labor with past pregnancy     Since her last LMP she denies use of alcohol, tobacco and street drugs.     Past medical, surgical, social and family history were reviewed and updated in [facility].        Current Outpatient Medications   Medication     Prenatal Multivit-Min-Fe-FA (PRENATAL VITAMINS PO)     senna-docusate (SENOKOT-S/PERICOLACE) 8.6-50 MG tablet     benzoyl peroxide-erythromycin (BENZAMYCIN) topical gel     Docosahexaenoic Acid (DHA PO)      No current facility-administered medications for this visit.         ROS:   12 point review of systems negative other than symptoms noted below or in the HPI.  Constitutional: Fatigue  Breast: Tenderness and significantly increases with latching [place] to the breast  Gastrointestinal: Constipation        OBJECTIVE:      EXAM:  LMP 2022  There is no height or weight on file to calculate BMI.        ASSESSMENT/PLAN:          ICD-10-CM     1. Supervision of high risk pregnancy in first trimester  O09.91     2. AMA (advanced maternal age) multigravida 35+  O09.529     3. PAF (paroxysmal atrial fibrillation) (H)  I48.0           36 year old , 7w4d weeks of pregnancy with NORMAN of 2022, by Last Menstrual Period     Nurse phone visit completed. Prenatal book and folder (containing standard educational hand-outs and brochures) will be given at next visit to patient. Information in folder  "reviewed over the phone. Questions answered. Brochure given on optional screening available to assess chromosomal anomalies. Pt desires NIPS. Pt advised to call the clinic if she has any questions or concerns related to her pregnancy. Prenatal labs future ordered. New prenatal visit scheduled on 6/3 with Brenton Franco RN on 5/18/2022 at 11:27 AM              Lab Results   Component Value Date     PAP NIL 03/29/2018      PHQ-9 score:    PHQ 5/16/2022   PHQ-9 Total Score 3   Q9: Thoughts of better off dead/self-harm past 2 weeks Not at all            ANDRZEJ-7 SCORE 11/10/2020 11/12/2021 5/16/2022   Total Score - - 2 (minimal anxiety)   Total Score 4 1 2         Patient supplied answers from flow sheet for:  Prenatal OB Questionnaire.  Past Medical History  Have you ever recieved care for your mental health? : (!) Yes  Have you ever been in a major accident or suffered serious trauma?: (!) Yes  Within the last year, has anyone hit, slapped, kicked or otherwise hurt you?: No  In the last year, has anyone forced you to have sex when you didn't want to?: No     Past Medical History 2   Have you ever received a blood transfusion?: (!) Yes  Would you accept a blood transfusion if was medically recommended?: Yes  Does anyone in your home smoke?: No   Is your blood type Rh negative?: No  Have you ever ?: (!) Yes  Have you been hospitalized for a nonsurgical reason excluding normal delivery?: No  Have you ever had an abnormal pap smear?: No     Past Medical History (Continued)  Do you have a history of abnormalities of the uterus?: No  Did your mother take KENEAN or any other hormones when she was pregnant with you?: No  Do you have any other problems we have not asked about which you feel may be important to this pregnancy?: (!) Yes            OBJECTIVE:     EXAM:  Ht 1.778 m (5' 10\")   Wt 59 kg (130 lb)   LMP 03/26/2022   Breastfeeding No   BMI 18.65 kg/m   Body mass index is 18.65 " kg/m .    GENERAL: healthy, alert and no distress  EYES: Eyes grossly normal to inspection, PERRL and conjunctivae and sclerae normal  HENT: ear canals and TM's normal, nose and mouth without ulcers or lesions  NECK: no adenopathy, no asymmetry, masses, or scars and thyroid normal to palpation  RESP: lungs clear to auscultation - no rales, rhonchi or wheezes  BREAST: normal without masses, tenderness or nipple discharge and no palpable axillary masses or adenopathy  CV: regular rate and rhythm, normal S1 S2, no S3 or S4, no murmur, click or rub, no peripheral edema and peripheral pulses strong  ABDOMEN: soft, nontender, no hepatosplenomegaly, no masses and bowel sounds normal  MS: no gross musculoskeletal defects noted, no edema  SKIN: no suspicious lesions or rashes  NEURO: Normal strength and tone, mentation intact and speech normal  PSYCH: mentation appears normal, affect normal/bright    ASSESSMENT/PLAN:       ICD-10-CM    1. Supervision of high risk pregnancy in first trimester  O09.91    2. Multigravida of advanced maternal age in first trimester  O09.521        36 year old , On Ana 3, 2022 patient is 9w6d weeks of pregnancy with NORMAN of 2022, by Last Menstrual Period    Discussed as follows:AMA, prior c/s, covid and vaccinations, genetic screen    Counseling given:   - Follow up in 4-6 weeks for return OB visit.  - Recommended weight gain for pregnancy: 25-35 lbs.      PLAN/PATIENT INSTRUCTIONS:    Patient Instructions   Avoid alcoholic beverages.  Discussed previous  section delivery.   risks and benefits discussed.  Discussed risk of rupture in detail.  Interested in repeat  section at 39wks.  Discussed advanced maternal age and testing options.  Patient is above age 35 at delivery.  Explained risks of chromosomal abnormalities and age risks.  Discussed amnio versus non-invasive testing and their individual limitations and risks.  Patient would like non-invasive testing to  start with.  Understands Invitae is a screenting test and not a diagnostic test.  Will plan to check AFP at 15-20wks and do level II ultrasound with MFM at 18-20wks.  Will meet with cardiology due to history of atrial fibrillation with last pregnancy.       Natalee Farris MD

## 2022-06-03 ENCOUNTER — PRENATAL OFFICE VISIT (OUTPATIENT)
Dept: OBGYN | Facility: CLINIC | Age: 37
End: 2022-06-03

## 2022-06-03 ENCOUNTER — ANCILLARY PROCEDURE (OUTPATIENT)
Dept: ULTRASOUND IMAGING | Facility: CLINIC | Age: 37
End: 2022-06-03
Payer: COMMERCIAL

## 2022-06-03 VITALS
BODY MASS INDEX: 18.61 KG/M2 | DIASTOLIC BLOOD PRESSURE: 60 MMHG | HEIGHT: 70 IN | SYSTOLIC BLOOD PRESSURE: 102 MMHG | WEIGHT: 130 LBS

## 2022-06-03 DIAGNOSIS — Z86.79 HISTORY OF ATRIAL FIBRILLATION: ICD-10-CM

## 2022-06-03 DIAGNOSIS — O09.91 SUPERVISION OF HIGH RISK PREGNANCY IN FIRST TRIMESTER: Primary | ICD-10-CM

## 2022-06-03 DIAGNOSIS — O09.91 SUPERVISION OF HIGH RISK PREGNANCY IN FIRST TRIMESTER: ICD-10-CM

## 2022-06-03 DIAGNOSIS — O34.219 PREVIOUS CESAREAN DELIVERY, ANTEPARTUM: ICD-10-CM

## 2022-06-03 DIAGNOSIS — O36.80X0 PREGNANCY WITH INCONCLUSIVE FETAL VIABILITY: ICD-10-CM

## 2022-06-03 DIAGNOSIS — O09.521 MULTIGRAVIDA OF ADVANCED MATERNAL AGE IN FIRST TRIMESTER: ICD-10-CM

## 2022-06-03 PROCEDURE — 76801 OB US < 14 WKS SINGLE FETUS: CPT | Performed by: OBSTETRICS & GYNECOLOGY

## 2022-06-03 PROCEDURE — 99207 PR FIRST OB VISIT: CPT | Performed by: OBSTETRICS & GYNECOLOGY

## 2022-06-03 NOTE — PATIENT INSTRUCTIONS
Avoid alcoholic beverages.  Discussed previous  section delivery.   risks and benefits discussed.  Discussed risk of rupture in detail.  Interested in repeat  section at 39wks.  Discussed advanced maternal age and testing options.  Patient is above age 35 at delivery.  Explained risks of chromosomal abnormalities and age risks.  Discussed amnio versus non-invasive testing and their individual limitations and risks.  Patient would like non-invasive testing to start with.  Understands Invitae is a screenting test and not a diagnostic test.  Will plan to check AFP at 15-20wks and do level II ultrasound with MFM at 18-20wks.  Will meet with cardiology due to history of atrial fibrillation with last pregnancy.

## 2022-06-08 ENCOUNTER — LAB (OUTPATIENT)
Dept: LAB | Facility: CLINIC | Age: 37
End: 2022-06-08
Payer: COMMERCIAL

## 2022-06-08 DIAGNOSIS — O09.91 SUPERVISION OF HIGH RISK PREGNANCY IN FIRST TRIMESTER: ICD-10-CM

## 2022-06-08 DIAGNOSIS — Z13.79 GENETIC SCREENING: ICD-10-CM

## 2022-06-08 LAB
ABO/RH(D): NORMAL
ALBUMIN UR-MCNC: NEGATIVE MG/DL
ANTIBODY SCREEN: NEGATIVE
APPEARANCE UR: CLEAR
BILIRUB UR QL STRIP: NEGATIVE
COLOR UR AUTO: YELLOW
ERYTHROCYTE [DISTWIDTH] IN BLOOD BY AUTOMATED COUNT: 12.9 % (ref 10–15)
GLUCOSE UR STRIP-MCNC: NEGATIVE MG/DL
HBV SURFACE AG SERPL QL IA: NONREACTIVE
HCT VFR BLD AUTO: 38.4 % (ref 35–47)
HCV AB SERPL QL IA: NONREACTIVE
HGB BLD-MCNC: 12.6 G/DL (ref 11.7–15.7)
HGB UR QL STRIP: NEGATIVE
HIV 1+2 AB+HIV1 P24 AG SERPL QL IA: NONREACTIVE
KETONES UR STRIP-MCNC: NEGATIVE MG/DL
LEUKOCYTE ESTERASE UR QL STRIP: NEGATIVE
MCH RBC QN AUTO: 30.7 PG (ref 26.5–33)
MCHC RBC AUTO-ENTMCNC: 32.8 G/DL (ref 31.5–36.5)
MCV RBC AUTO: 93 FL (ref 78–100)
NITRATE UR QL: NEGATIVE
PH UR STRIP: 5.5 [PH] (ref 5–7)
PLATELET # BLD AUTO: 245 10E3/UL (ref 150–450)
RBC # BLD AUTO: 4.11 10E6/UL (ref 3.8–5.2)
RUBV IGG SERPL QL IA: 1.81 INDEX
RUBV IGG SERPL QL IA: POSITIVE
SP GR UR STRIP: 1.02 (ref 1–1.03)
SPECIMEN EXPIRATION DATE: NORMAL
T PALLIDUM AB SER QL: NONREACTIVE
UROBILINOGEN UR STRIP-ACNC: 0.2 E.U./DL
WBC # BLD AUTO: 6.9 10E3/UL (ref 4–11)

## 2022-06-08 PROCEDURE — 86850 RBC ANTIBODY SCREEN: CPT

## 2022-06-08 PROCEDURE — 81003 URINALYSIS AUTO W/O SCOPE: CPT

## 2022-06-08 PROCEDURE — 86900 BLOOD TYPING SEROLOGIC ABO: CPT

## 2022-06-08 PROCEDURE — 86762 RUBELLA ANTIBODY: CPT

## 2022-06-08 PROCEDURE — 86803 HEPATITIS C AB TEST: CPT

## 2022-06-08 PROCEDURE — 87389 HIV-1 AG W/HIV-1&-2 AB AG IA: CPT

## 2022-06-08 PROCEDURE — 36415 COLL VENOUS BLD VENIPUNCTURE: CPT

## 2022-06-08 PROCEDURE — 86780 TREPONEMA PALLIDUM: CPT

## 2022-06-08 PROCEDURE — 85027 COMPLETE CBC AUTOMATED: CPT

## 2022-06-08 PROCEDURE — 87086 URINE CULTURE/COLONY COUNT: CPT

## 2022-06-08 PROCEDURE — 86901 BLOOD TYPING SEROLOGIC RH(D): CPT

## 2022-06-08 PROCEDURE — 87340 HEPATITIS B SURFACE AG IA: CPT

## 2022-06-09 LAB — BACTERIA UR CULT: NORMAL

## 2022-06-13 ENCOUNTER — TELEPHONE (OUTPATIENT)
Dept: OBGYN | Facility: CLINIC | Age: 37
End: 2022-06-13
Payer: COMMERCIAL

## 2022-06-13 LAB — SCANNED LAB RESULT: NORMAL

## 2022-07-01 ENCOUNTER — PRENATAL OFFICE VISIT (OUTPATIENT)
Dept: OBGYN | Facility: CLINIC | Age: 37
End: 2022-07-01
Payer: COMMERCIAL

## 2022-07-01 ENCOUNTER — TRANSCRIBE ORDERS (OUTPATIENT)
Dept: MATERNAL FETAL MEDICINE | Facility: CLINIC | Age: 37
End: 2022-07-01

## 2022-07-01 VITALS — BODY MASS INDEX: 19.37 KG/M2 | DIASTOLIC BLOOD PRESSURE: 74 MMHG | WEIGHT: 135 LBS | SYSTOLIC BLOOD PRESSURE: 118 MMHG

## 2022-07-01 DIAGNOSIS — O09.91 SUPERVISION OF HIGH RISK PREGNANCY IN FIRST TRIMESTER: Primary | ICD-10-CM

## 2022-07-01 DIAGNOSIS — O34.219 PREVIOUS CESAREAN DELIVERY, ANTEPARTUM: ICD-10-CM

## 2022-07-01 DIAGNOSIS — Z3A.13 13 WEEKS GESTATION OF PREGNANCY: ICD-10-CM

## 2022-07-01 DIAGNOSIS — O26.90 PREGNANCY RELATED CONDITION, ANTEPARTUM: Primary | ICD-10-CM

## 2022-07-01 DIAGNOSIS — O09.521 MULTIGRAVIDA OF ADVANCED MATERNAL AGE IN FIRST TRIMESTER: ICD-10-CM

## 2022-07-01 PROCEDURE — 99207 PR PRENATAL VISIT: CPT | Performed by: OBSTETRICS & GYNECOLOGY

## 2022-07-25 ENCOUNTER — PRE VISIT (OUTPATIENT)
Dept: MATERNAL FETAL MEDICINE | Facility: CLINIC | Age: 37
End: 2022-07-25

## 2022-07-29 ENCOUNTER — PREP FOR PROCEDURE (OUTPATIENT)
Dept: OBGYN | Facility: CLINIC | Age: 37
End: 2022-07-29

## 2022-07-29 ENCOUNTER — PRENATAL OFFICE VISIT (OUTPATIENT)
Dept: OBGYN | Facility: CLINIC | Age: 37
End: 2022-07-29
Payer: COMMERCIAL

## 2022-07-29 VITALS — BODY MASS INDEX: 19.51 KG/M2 | SYSTOLIC BLOOD PRESSURE: 110 MMHG | DIASTOLIC BLOOD PRESSURE: 62 MMHG | WEIGHT: 136 LBS

## 2022-07-29 DIAGNOSIS — O09.522 MULTIGRAVIDA OF ADVANCED MATERNAL AGE IN SECOND TRIMESTER: ICD-10-CM

## 2022-07-29 DIAGNOSIS — O09.92 SUPERVISION OF HIGH RISK PREGNANCY IN SECOND TRIMESTER: Primary | ICD-10-CM

## 2022-07-29 DIAGNOSIS — Z3A.17 17 WEEKS GESTATION OF PREGNANCY: ICD-10-CM

## 2022-07-29 DIAGNOSIS — O34.219 PREVIOUS CESAREAN DELIVERY, ANTEPARTUM: Primary | ICD-10-CM

## 2022-07-29 DIAGNOSIS — O34.219 PREVIOUS CESAREAN DELIVERY, ANTEPARTUM: ICD-10-CM

## 2022-07-29 PROCEDURE — 99207 PR PRENATAL VISIT: CPT | Performed by: OBSTETRICS & GYNECOLOGY

## 2022-07-29 PROCEDURE — 99000 SPECIMEN HANDLING OFFICE-LAB: CPT | Performed by: OBSTETRICS & GYNECOLOGY

## 2022-07-29 PROCEDURE — 82105 ALPHA-FETOPROTEIN SERUM: CPT | Performed by: OBSTETRICS & GYNECOLOGY

## 2022-07-29 PROCEDURE — 36415 COLL VENOUS BLD VENIPUNCTURE: CPT | Performed by: OBSTETRICS & GYNECOLOGY

## 2022-07-29 NOTE — PROGRESS NOTES
Doing well today.  No concerns/issues  No vb/spotting/cramping  More GERD --ordered TUMS  No bowel/bladder issues  Normal invitae; will check AFP today. Level II with MFM next week  RTC 4wks  Will request 12/28 for RLTCS

## 2022-08-01 ENCOUNTER — OFFICE VISIT (OUTPATIENT)
Dept: MATERNAL FETAL MEDICINE | Facility: CLINIC | Age: 37
End: 2022-08-01
Attending: OBSTETRICS & GYNECOLOGY
Payer: COMMERCIAL

## 2022-08-01 ENCOUNTER — HOSPITAL ENCOUNTER (OUTPATIENT)
Dept: ULTRASOUND IMAGING | Facility: CLINIC | Age: 37
Discharge: HOME | End: 2022-08-01
Attending: OBSTETRICS & GYNECOLOGY
Payer: COMMERCIAL

## 2022-08-01 ENCOUNTER — TELEPHONE (OUTPATIENT)
Dept: OBGYN | Facility: CLINIC | Age: 37
End: 2022-08-01

## 2022-08-01 DIAGNOSIS — O26.90 PREGNANCY RELATED CONDITION, ANTEPARTUM: ICD-10-CM

## 2022-08-01 DIAGNOSIS — O09.522 MULTIGRAVIDA OF ADVANCED MATERNAL AGE IN SECOND TRIMESTER: Primary | ICD-10-CM

## 2022-08-01 PROCEDURE — 76811 OB US DETAILED SNGL FETUS: CPT | Mod: 26 | Performed by: OBSTETRICS & GYNECOLOGY

## 2022-08-01 PROCEDURE — 76811 OB US DETAILED SNGL FETUS: CPT

## 2022-08-01 NOTE — TELEPHONE ENCOUNTER
ERAS BAG GIVEN No  ERAS INSTRUCTIONS EXPLAINED No    ASSIST: Marycruz    H&P TO BE COMPLETED BY:   Surgeon  FOR H&P TO BE DONE BY SURGEON   NEED TO RETURN TO CLINIC PRIOR TO SURGERY  SAME DAY/OBSERVATION/INPATIENT: ADMIT  EQUIPMENT: none  VENDOR NEEDED AT CASE: none  IF IUD WHAT BRAND none  LABS/SPECIAL INSTRUCTIONS: none  TIME OFF WORK: 8 weeks  ESTIMATED DOCTOR TIME NEEDED IN MINUTES 60min  POST OP TO BE SCHEDULED AT 6 WEEKS AFTER SX APPOINTMENT LENGTH IN MINUTES 20

## 2022-08-01 NOTE — PROGRESS NOTES
The patient was seen for an ultrasound in the [facility] at the [facility] today.  For a detailed report of the ultrasound examination, please see the ultrasound report which can be found under the imaging tab.    Harmony Hankins MD  , OB/GYN  Maternal-Fetal Medicine  [phone] (Pager)

## 2022-08-01 NOTE — TELEPHONE ENCOUNTER
Type of surgery: RPT CS  Location of surgery: [facility]  Date and time of surgery: 12/28/2022 7:15a  Surgeon: JENNA Iyer  Pre-Op Appt Date: HOSPITAL  Post-Op Appt Date: 2/7/2023 3:40p   Packet sent out: MAILED 8/1/2022  Pre-cert/Authorization completed:  TBD  Date: 8/1/2022 Nuha rodriguez/Cassandra    COVID TEST 12/26/2022 8:30a    Chel Naik  Surgery Scheduler

## 2022-08-03 LAB
# FETUSES US: NORMAL
AFP MOM SERPL: 0.76
AFP SERPL-MCNC: 36 NG/ML
AGE - REPORTED: 37 YR
CURRENT SMOKER: NO
FAMILY MEMBER DISEASES HX: NO
GA METHOD: NORMAL
GA: NORMAL WK
IDDM PATIENT QL: NO
INTEGRATED SCN PATIENT-IMP: NORMAL
SPECIMEN DRAWN SERPL: NORMAL

## 2022-08-03 NOTE — PROGRESS NOTES
HPI and Plan:                   CURRENT MEDICATIONS:  Current Outpatient Medications   Medication Sig Dispense Refill     benzoyl peroxide-erythromycin (BENZAMYCIN) topical gel Apply twice daily 45 g 3     Docosahexaenoic Acid (DHA PO)        Prenatal Multivit-Min-Fe-FA (PRENATAL VITAMINS PO) Take 1 tablet by mouth       senna-docusate (SENOKOT-S/PERICOLACE) 8.6-50 MG tablet Take 1 tablet by mouth daily         ALLERGIES     Allergies   Allergen Reactions     Seasonal Allergies        PAST MEDICAL HISTORY:  Past Medical History:   Diagnosis Date     Acute blood loss anemia 2019     Anxiety     Therapy--primarily due to last delivery and fear around this delivery     CP (cerebral palsy) (H)      History of blood transfusion      PAF (paroxysmal atrial fibrillation) (H)      Scoliosis 05/10/2012     Seasonal allergies      Status post  delivery 2019       PAST SURGICAL HISTORY:  Past Surgical History:   Procedure Laterality Date      SECTION N/A 2019    Procedure:  SECTION;  Surgeon: Natalee Farris MD;  Location:  L+D     CYSTOSCOPY N/A 2019    Procedure: CYSTOSCOPY;  Surgeon: Natalee Farris MD;  Location:  OR     CYSTOSCOPY, CYSTOGRAM, COMBINED N/A 2019    Procedure: Cystoscopy, cystogram, combined;  Surgeon: Pedro Luis Mireles MD;  Location:  OR     CYSTOSCOPY, RETROGRADES, INSERT STENT URETER(S), COMBINED  2019    Procedure: CYSTOSCOPY WITH BILATERAL RETROGRADES/ BILATERAL STENT PLACEMENT AND REMOVAL/ CYSTOGRAM/ BLADDER REPAIR;  Surgeon: Pedro Luis Mireles MD;  Location:  OR      REMOVE TONSILS/ADENOIDS,<11 Y/O       HC TOOTH EXTRACTION W/FORCEP      Mobile Teeth     LAPAROTOMY EXPLORATORY N/A 2019    Procedure: EXPLORATORY LAPAROTOMY WITH REPEAT EXPLORATORY LAPAROTOMY WITH BLADDER REPAIR;  Surgeon: Natalee Fraris MD;  Location:  OR     REPAIR BLADDER N/A 2019    Procedure: REPAIR, BLADDER;  Surgeon: Brenton  Natalee Chinchilla MD;  Location:  OR       FAMILY HISTORY:  Family History   Problem Relation Age of Onset     No Known Problems Mother      Hypertension Father      Diabetes Father      Neurologic Disorder Sister      Depression Brother         bipolar depression     Neurologic Disorder Brother         epliepsy     Psychotic Disorder Maternal Grandmother      Prostate Cancer Maternal Grandfather      Cancer - colorectal Maternal Grandfather      Diabetes Maternal Grandfather      No Known Problems Paternal Grandmother      No Known Problems Paternal Grandfather      No Known Problems Other        SOCIAL HISTORY:  Social History     Socioeconomic History     Marital status:      Spouse name: Braeden     Number of children: 1   Occupational History     Occupation: Supply Chain     Employer: [employer]   Tobacco Use     Smoking status: Never Smoker     Smokeless tobacco: Never Used   Vaping Use     Vaping Use: Never used   Substance and Sexual Activity     Alcohol use: Yes     Comment: Less than once a month     Drug use: No     Sexual activity: Yes     Partners: Male     Birth control/protection: None       Review of Systems:  Skin:          Eyes:         ENT:         Respiratory:          Cardiovascular:         Gastroenterology:        Genitourinary:         Musculoskeletal:         Neurologic:         Psychiatric:         Heme/Lymph/Imm:         Endocrine:           Physical Exam:  Vitals: LMP 03/26/2022     Constitutional:  cooperative, alert and oriented, well developed, well nourished, in no acute distress        Skin:  warm and dry to the touch, no apparent skin lesions or masses noted          Head:  normocephalic, no masses or lesions        Eyes:  pupils equal and round        Lymph:      ENT:  no pallor or cyanosis, dentition good        Neck:  JVP normal        Respiratory:  clear to auscultation         Cardiac: regular rhythm                pulses full and equal                                        GI:   abdomen soft        Extremities and Muscular Skeletal:  no edema              Neurological:  no gross motor deficits        Psych:  Alert and Oriented x 3        CC  Referred Self, MD  No address on file

## 2022-08-04 ENCOUNTER — OFFICE VISIT (OUTPATIENT)
Dept: CARDIOLOGY | Facility: CLINIC | Age: 37
End: 2022-08-04
Payer: COMMERCIAL

## 2022-08-04 ENCOUNTER — MYC MEDICAL ADVICE (OUTPATIENT)
Dept: CARDIOLOGY | Facility: CLINIC | Age: 37
End: 2022-08-04

## 2022-08-04 VITALS
SYSTOLIC BLOOD PRESSURE: 89 MMHG | BODY MASS INDEX: 20.72 KG/M2 | DIASTOLIC BLOOD PRESSURE: 58 MMHG | WEIGHT: 139.9 LBS | HEART RATE: 87 BPM | HEIGHT: 69 IN

## 2022-08-04 DIAGNOSIS — I48.0 PAF (PAROXYSMAL ATRIAL FIBRILLATION) (H): Primary | ICD-10-CM

## 2022-08-04 PROCEDURE — 93000 ELECTROCARDIOGRAM COMPLETE: CPT | Performed by: INTERNAL MEDICINE

## 2022-08-04 PROCEDURE — 99204 OFFICE O/P NEW MOD 45 MIN: CPT | Performed by: INTERNAL MEDICINE

## 2022-08-04 NOTE — PROGRESS NOTES
Service Date: 2022    REASON FOR CONSULTATION:  Paroxysmal atrial fibrillation.    HISTORY OF PRESENT ILLNESS:  I had the pleasure of seeing Ms. Grossman consultation history of heart today.  She is a very pleasant 36-year-old female who was seen previously in our clinic in 2019 after a hospitalization at the time for paroxysmal atrial fibrillation in the context of active labor.    The patient was previously healthy and was admitted while in active labor at the time.  She received an epidural and due to generalized feeling of malaise and unease, an EKG was performed, which demonstrated atrial fibrillation with rapid ventricular response.  She received IV diltiazem and converted spontaneously to normal sinus rhythm.  Subsequently, however, she underwent a  with resultant bladder injury and required repair with associated hemorrhagic shock.  An echocardiogram was ordered during her hospitalization, which was essentially normal with the exception of mild right atrial enlargement.  A Zio Patch monitor subsequently demonstrated one episode of SVT that lasted for 4 beats.  No recurrent atrial fibrillation was noted.    At that time, she was seen in our clinic in 2019.  She was doing well overall from a cardiovascular standpoint.    She returns today approximately 18 weeks into her second pregnancy.  She has felt well since 2019 with no evidence of recurrent atrial fibrillation.  During this pregnancy, she has had occasional dizziness and one episode of pronounced dizziness in association with some palpitations that occurred in the context of increased stress at work.  Symptoms have not recurred.  She remains extremely active, exercising on a regular basis for approximately an hour without any limitations.    Her past medical history, family history, social history, allergies and medications are in Epic.    PHYSICAL EXAMINATION:  Dictated below.    Her EKG today demonstrated sinus rhythm at a rate of  74 beats per minute with normal axis and intervals.    IMPRESSION:  Paroxysmal atrial fibrillation in the context of active labor in 2019.      The patient is doing well overall from a cardiovascular standpoint with no evidence of recurrent atrial fibrillation.  We discussed that the physiologic stress of labor is most likely what triggered episodes in 2019.  I will obtain an echocardiogram and a Zio patch monitor to obtain a baseline as far as her cardiac status is concerned, but at this time she is planning to have a  and I anticipate that atrial fibrillation will be less likely to occur in this controlled situation.    We did discuss her exercise routine, which I think is entirely appropriate.  Given her relatively low blood pressures and occasional dizziness, however, I have encouraged her to hydrate aggressively.    It was a pleasure seeing her in consultation today.  We will communicate the results of her echocardiogram and Zio patch monitor to her and decide at that point if any further cardiac evaluation is necessary.    Ishmael Finch MD        D: 2022   T: 2022   MT: BYRON    Name:     DIMPLE COCHRANCynthia  MRN:      [id number]        Account:      [id number]   :      1985           Service Date: 2022       Document: [id number]

## 2022-08-04 NOTE — TELEPHONE ENCOUNTER
I think we need a cardiac specific monitor for rhythm assessment. Can we try a 48 Hour Holter? It looks like the zio patch was an issue last time. Thanks.

## 2022-08-04 NOTE — LETTER
2022    Natalee Farris MD  [address]  [facility] [address]    RE: Debbi Grossman       Dear Colleague,     I had the pleasure of seeing Debbi Grossman in the [facility].  HPI and Plan:                   CURRENT MEDICATIONS:  Current Outpatient Medications   Medication Sig Dispense Refill     benzoyl peroxide-erythromycin (BENZAMYCIN) topical gel Apply twice daily 45 g 3     Docosahexaenoic Acid (DHA PO)        Prenatal Multivit-Min-Fe-FA (PRENATAL VITAMINS PO) Take 1 tablet by mouth       senna-docusate (SENOKOT-S/PERICOLACE) 8.6-50 MG tablet Take 1 tablet by mouth daily         ALLERGIES     Allergies   Allergen Reactions     Seasonal Allergies        PAST MEDICAL HISTORY:  Past Medical History:   Diagnosis Date     Acute blood loss anemia 2019     Anxiety     Therapy--primarily due to last delivery and fear around this delivery     CP (cerebral palsy) (H)      History of blood transfusion      PAF (paroxysmal atrial fibrillation) (H)      Scoliosis 05/10/2012     Seasonal allergies      Status post  delivery 2019       PAST SURGICAL HISTORY:  Past Surgical History:   Procedure Laterality Date      SECTION N/A 2019    Procedure:  SECTION;  Surgeon: Natalee Farris MD;  Location:  L+D     CYSTOSCOPY N/A 2019    Procedure: CYSTOSCOPY;  Surgeon: Natalee Farris MD;  Location:  OR     CYSTOSCOPY, CYSTOGRAM, COMBINED N/A 2019    Procedure: Cystoscopy, cystogram, combined;  Surgeon: Pedro Luis Mireles MD;  Location:  OR     CYSTOSCOPY, RETROGRADES, INSERT STENT URETER(S), COMBINED  2019    Procedure: CYSTOSCOPY WITH BILATERAL RETROGRADES/ BILATERAL STENT PLACEMENT AND REMOVAL/ CYSTOGRAM/ BLADDER REPAIR;  Surgeon: Pedor Luis Mireles MD;  Location:  OR     HC REMOVE TONSILS/ADENOIDS,<13 Y/O       HC TOOTH EXTRACTION W/FORCEP      [place] Teeth     LAPAROTOMY EXPLORATORY N/A 2019    Procedure:  EXPLORATORY LAPAROTOMY WITH REPEAT EXPLORATORY LAPAROTOMY WITH BLADDER REPAIR;  Surgeon: Natalee Farris MD;  Location: [facility] OR     REPAIR BLADDER N/A 8/23/2019    Procedure: REPAIR, BLADDER;  Surgeon: Natalee Farris MD;  Location: [facility] OR       FAMILY HISTORY:  Family History   Problem Relation Age of Onset     No Known Problems Mother      Hypertension Father      Diabetes Father      Neurologic Disorder Sister      Depression Brother         bipolar depression     Neurologic Disorder Brother         epliepsy     Psychotic Disorder Maternal Grandmother      Prostate Cancer Maternal Grandfather      Cancer - colorectal Maternal Grandfather      Diabetes Maternal Grandfather      No Known Problems Paternal Grandmother      No Known Problems Paternal Grandfather      No Known Problems Other        SOCIAL HISTORY:  Social History     Socioeconomic History     Marital status:      Spouse name: Braeden     Number of children: 1   Occupational History     Occupation: Supply Chain     Employer: [employer]   Tobacco Use     Smoking status: Never Smoker     Smokeless tobacco: Never Used   Vaping Use     Vaping Use: Never used   Substance and Sexual Activity     Alcohol use: Yes     Comment: Less than once a month     Drug use: No     Sexual activity: Yes     Partners: Male     Birth control/protection: None       Review of Systems:  Skin:          Eyes:         ENT:         Respiratory:          Cardiovascular:         Gastroenterology:        Genitourinary:         Musculoskeletal:         Neurologic:         Psychiatric:         Heme/Lymph/Imm:         Endocrine:           Physical Exam:  Vitals: LMP 03/26/2022     Constitutional:  cooperative, alert and oriented, well developed, well nourished, in no acute distress        Skin:  warm and dry to the touch, no apparent skin lesions or masses noted          Head:  normocephalic, no masses or lesions        Eyes:  pupils equal and round        Lymph:      ENT:  no  pallor or cyanosis, dentition good        Neck:  JVP normal        Respiratory:  clear to auscultation         Cardiac: regular rhythm                pulses full and equal                                        GI:  abdomen soft        Extremities and Muscular Skeletal:  no edema              Neurological:  no gross motor deficits        Psych:  Alert and Oriented x 3        CC  Referred Self,    Service Date: 2022    REASON FOR CONSULTATION:  Paroxysmal atrial fibrillation.    HISTORY OF PRESENT ILLNESS:  I had the pleasure of seeing Ms. Grossman consultation history of heart today.  She is a very pleasant 36-year-old female who was seen previously in our clinic in 2019 after a hospitalization at the time for paroxysmal atrial fibrillation in the context of active labor.    The patient was previously healthy and was admitted while in active labor at the time.  She received an epidural and due to generalized feeling of malaise and unease, an EKG was performed, which demonstrated atrial fibrillation with rapid ventricular response.  She received IV diltiazem and converted spontaneously to normal sinus rhythm.  Subsequently, however, she underwent a  with resultant bladder injury and required repair with associated hemorrhagic shock.  An echocardiogram was ordered during her hospitalization, which was essentially normal with the exception of mild right atrial enlargement.  A Zio Patch monitor subsequently demonstrated one episode of SVT that lasted for 4 beats.  No recurrent atrial fibrillation was noted.    At that time, she was seen in our clinic in 2019.  She was doing well overall from a cardiovascular standpoint.    She returns today approximately 18 weeks into her second pregnancy.  She has felt well since  with no evidence of recurrent atrial fibrillation.  During this pregnancy, she has had occasional dizziness and one episode of pronounced dizziness in association with some  palpitations that occurred in the context of increased stress at work.  Symptoms have not recurred.  She remains extremely active, exercising on a regular basis for approximately an hour without any limitations.    Her past medical history, family history, social history, allergies and medications are in Epic.    PHYSICAL EXAMINATION:  Dictated below.    Her EKG today demonstrated sinus rhythm at a rate of 74 beats per minute with normal axis and intervals.    IMPRESSION:  Paroxysmal atrial fibrillation in the context of active labor in 2019.  The patient is doing well overall from a cardiovascular standpoint with no evidence of recurrent atrial fibrillation.  We discussed that the physiologic stress of labor is most likely what triggered episodes in 2019.  I will obtain an echocardiogram and a Zio patch monitor to obtain a baseline as far as her cardiac status is concerned, but at this time, she is planning to have a  and I anticipate that atrial fibrillation will be less likely to occur and this controlled situation.    We did discuss her exercise routine, which I think is entirely appropriate.  Given her relatively low blood pressures and occasional dizziness, however, I have encouraged her to hydrate aggressively during these coming months.    It was a pleasure seeing her in consultation today.  We will communicate the results of her echocardiogram and Zio patch monitor to her and decide at that point if any further cardiac evaluation is necessary.    Ishmael Finch MD        D: 2022   T: 2022   MT: BYRON    Name:     DIMPLE COCHRAN  MRN:      [id number]        Account:      [id number]   :      1985           Service Date: 2022       Document: [id number]    Thank you for allowing me to participate in the care of your patient.      Sincerely,     Ishmael Finch MD     [facility]

## 2022-08-04 NOTE — TELEPHONE ENCOUNTER
My chart update to patient:    Ms. Ngoc Krishnamurthy,    Dr. Finch replied: I think we need a cardiac specific monitor for rhythm assessment. Can we try a 48 Hour Holter?     We have put in an order for the 48 hour holter to replace the ziopatch. Please watch for a call from the scheduling team to change to the 2-day monitor.    Thank you  Team 2 RNs  [phone]    Message to scheduling team to change from the ziopatch on 8/11/2022 to a 48 hour holter.

## 2022-08-04 NOTE — TELEPHONE ENCOUNTER
My chart message from patient:  Hi Dr. Finch,     It was nice to meet you today. I had another question on the heart rate monitor that I have to wear. Last time I had to do that it kept falling off of me. Is there a way we can use the data from my Fitbit instead?     Marjan,   Debbi     7-day ziopatch is currently scheduled for 8/11/2022.  Echo is scheduled for 8/6/2022    Will message Dr. Finch to review

## 2022-08-06 ENCOUNTER — HOSPITAL ENCOUNTER (OUTPATIENT)
Dept: CARDIOLOGY | Facility: CLINIC | Age: 37
Discharge: HOME | End: 2022-08-06
Attending: INTERNAL MEDICINE | Admitting: INTERNAL MEDICINE
Payer: COMMERCIAL

## 2022-08-06 DIAGNOSIS — I48.0 PAF (PAROXYSMAL ATRIAL FIBRILLATION) (H): ICD-10-CM

## 2022-08-06 LAB — LVEF ECHO: NORMAL

## 2022-08-06 PROCEDURE — 93306 TTE W/DOPPLER COMPLETE: CPT

## 2022-08-06 PROCEDURE — 93306 TTE W/DOPPLER COMPLETE: CPT | Mod: 26 | Performed by: INTERNAL MEDICINE

## 2022-08-08 ENCOUNTER — TELEPHONE (OUTPATIENT)
Dept: CARDIOLOGY | Facility: CLINIC | Age: 37
End: 2022-08-08

## 2022-08-08 NOTE — TELEPHONE ENCOUNTER
Echo completed as below, routed to Dr Finch to review. Ordered for baseline assessment of cardiac function, hx afib during labor in 2019. Ziopatch scheduled for 8/11/22.     Sinus rhythm was noted.  Left ventricular systolic function is low normal.  The visual ejection fraction is 50-55%.  The left ventricle is normal in size.  Doppler intrrogation does not demonstrate signficant stenosis or insufficiency involving cardiac valves  As compared with the last study 8/23/2019 the patient is now in a sinus rhythm. There has otherwise been no signficant change.

## 2022-08-09 NOTE — TELEPHONE ENCOUNTER
Spoke to patient, reviewed echo and message from Dr Finch. Pt verbalized understanding, no additional questions at this time.

## 2022-08-11 ENCOUNTER — HOSPITAL ENCOUNTER (OUTPATIENT)
Dept: CARDIOLOGY | Facility: CLINIC | Age: 37
Discharge: HOME | End: 2022-08-11
Attending: INTERNAL MEDICINE | Admitting: INTERNAL MEDICINE
Payer: COMMERCIAL

## 2022-08-11 DIAGNOSIS — I48.0 PAF (PAROXYSMAL ATRIAL FIBRILLATION) (H): ICD-10-CM

## 2022-08-11 PROCEDURE — 93227 XTRNL ECG REC<48 HR R&I: CPT | Performed by: INTERNAL MEDICINE

## 2022-08-11 PROCEDURE — 93225 XTRNL ECG REC<48 HRS REC: CPT

## 2022-08-22 ENCOUNTER — TELEPHONE (OUTPATIENT)
Dept: CARDIOLOGY | Facility: CLINIC | Age: 37
End: 2022-08-22

## 2022-08-22 DIAGNOSIS — I48.0 PAF (PAROXYSMAL ATRIAL FIBRILLATION) (H): Primary | ICD-10-CM

## 2022-08-22 NOTE — TELEPHONE ENCOUNTER
48 hour holter monitor report 2022 noted. Ordered for previous history of atrial fibrillation during labor/delivery and . Patient is currently pregnant.    Echo was read as normal.    Holter:      Per Dr. Finch's dictation on 2022:  We will communicate the results of her echocardiogram and Zio patch monitor to her and decide at that point if any further cardiac evaluation is necessary.    0850 spoke with patient, she states she has not had any further dizziness issues and is working to stay hydrated as directed.    Will message Dr. Finch to review for future steps.

## 2022-08-22 NOTE — TELEPHONE ENCOUNTER
Spoke to patient and reviewed message from Dr Finch, pt was agreeable to plan. Order placed for follow up if needed.

## 2022-09-06 ENCOUNTER — PRENATAL OFFICE VISIT (OUTPATIENT)
Dept: OBGYN | Facility: CLINIC | Age: 37
End: 2022-09-06
Payer: COMMERCIAL

## 2022-09-06 VITALS — SYSTOLIC BLOOD PRESSURE: 92 MMHG | WEIGHT: 146.6 LBS | BODY MASS INDEX: 21.65 KG/M2 | DIASTOLIC BLOOD PRESSURE: 56 MMHG

## 2022-09-06 DIAGNOSIS — Z23 NEED FOR PROPHYLACTIC VACCINATION AND INOCULATION AGAINST INFLUENZA: ICD-10-CM

## 2022-09-06 DIAGNOSIS — O09.92 SUPERVISION OF HIGH RISK PREGNANCY IN SECOND TRIMESTER: Primary | ICD-10-CM

## 2022-09-06 DIAGNOSIS — O34.219 PREVIOUS CESAREAN DELIVERY, ANTEPARTUM: ICD-10-CM

## 2022-09-06 DIAGNOSIS — Z3A.23 23 WEEKS GESTATION OF PREGNANCY: ICD-10-CM

## 2022-09-06 PROCEDURE — 90471 IMMUNIZATION ADMIN: CPT | Performed by: OBSTETRICS & GYNECOLOGY

## 2022-09-06 PROCEDURE — 99207 PR PRENATAL VISIT: CPT | Performed by: OBSTETRICS & GYNECOLOGY

## 2022-09-06 PROCEDURE — 90686 IIV4 VACC NO PRSV 0.5 ML IM: CPT | Performed by: OBSTETRICS & GYNECOLOGY

## 2022-09-06 NOTE — PROGRESS NOTES
Doing well today.  +FM  No ctx/cramping/vb/spotting/lof  Overall feeling well  NO bowel/bladder issues  Flu shot today  RLTCS scheduled with me after holidays  Traveling to [place] next week!  RTC 4wks --glucola, CBC and Tdap at that time

## 2022-09-27 DIAGNOSIS — Z23 NEED FOR TDAP VACCINATION: ICD-10-CM

## 2022-09-27 DIAGNOSIS — Z36.9 ENCOUNTER FOR ANTENATAL SCREENING OF MOTHER: Primary | ICD-10-CM

## 2022-10-04 ENCOUNTER — LAB (OUTPATIENT)
Dept: LAB | Facility: CLINIC | Age: 37
End: 2022-10-04

## 2022-10-04 ENCOUNTER — PRENATAL OFFICE VISIT (OUTPATIENT)
Dept: OBGYN | Facility: CLINIC | Age: 37
End: 2022-10-04
Payer: COMMERCIAL

## 2022-10-04 VITALS — BODY MASS INDEX: 21.71 KG/M2 | DIASTOLIC BLOOD PRESSURE: 60 MMHG | SYSTOLIC BLOOD PRESSURE: 114 MMHG | WEIGHT: 147 LBS

## 2022-10-04 DIAGNOSIS — Z36.9 ENCOUNTER FOR ANTENATAL SCREENING OF MOTHER: ICD-10-CM

## 2022-10-04 DIAGNOSIS — O09.93 SUPERVISION OF HIGH RISK PREGNANCY IN THIRD TRIMESTER: Primary | ICD-10-CM

## 2022-10-04 DIAGNOSIS — R73.02 IMPAIRED GLUCOSE TOLERANCE: Primary | ICD-10-CM

## 2022-10-04 DIAGNOSIS — O09.523 MULTIGRAVIDA OF ADVANCED MATERNAL AGE IN THIRD TRIMESTER: ICD-10-CM

## 2022-10-04 DIAGNOSIS — Z23 NEED FOR TDAP VACCINATION: ICD-10-CM

## 2022-10-04 DIAGNOSIS — Z3A.27 27 WEEKS GESTATION OF PREGNANCY: ICD-10-CM

## 2022-10-04 DIAGNOSIS — O34.219 PREVIOUS CESAREAN DELIVERY, ANTEPARTUM: ICD-10-CM

## 2022-10-04 DIAGNOSIS — Z86.16 PERSONAL HISTORY OF COVID-19: ICD-10-CM

## 2022-10-04 LAB
ERYTHROCYTE [DISTWIDTH] IN BLOOD BY AUTOMATED COUNT: 13.2 % (ref 10–15)
GLUCOSE 1H P 50 G GLC PO SERPL-MCNC: 140 MG/DL (ref 70–129)
HCT VFR BLD AUTO: 34.9 % (ref 35–47)
HGB BLD-MCNC: 11.6 G/DL (ref 11.7–15.7)
MCH RBC QN AUTO: 31.4 PG (ref 26.5–33)
MCHC RBC AUTO-ENTMCNC: 33.2 G/DL (ref 31.5–36.5)
MCV RBC AUTO: 94 FL (ref 78–100)
PLATELET # BLD AUTO: 271 10E3/UL (ref 150–450)
RBC # BLD AUTO: 3.7 10E6/UL (ref 3.8–5.2)
WBC # BLD AUTO: 9.3 10E3/UL (ref 4–11)

## 2022-10-04 PROCEDURE — 90715 TDAP VACCINE 7 YRS/> IM: CPT | Performed by: OBSTETRICS & GYNECOLOGY

## 2022-10-04 PROCEDURE — 85027 COMPLETE CBC AUTOMATED: CPT

## 2022-10-04 PROCEDURE — 82950 GLUCOSE TEST: CPT

## 2022-10-04 PROCEDURE — 99207 PR PRENATAL VISIT: CPT | Performed by: OBSTETRICS & GYNECOLOGY

## 2022-10-04 PROCEDURE — 36415 COLL VENOUS BLD VENIPUNCTURE: CPT

## 2022-10-04 PROCEDURE — 90471 IMMUNIZATION ADMIN: CPT | Performed by: OBSTETRICS & GYNECOLOGY

## 2022-10-04 NOTE — PROGRESS NOTES
Doing well today.  +FM  No ctx/cramping/vb/spotting  Had good time at [place] but got COVID --essentially asymptomatic; had received booster just a few days prior  No residual issues/concerns.  Wll check growth us at 36wks  Glucola, CBC and Tdap today  RTC 2wks

## 2022-10-05 NOTE — TELEPHONE ENCOUNTER
Due to Enadeghe out Dr Mota will be assisting on this case  Spoke w/Lupis at [facility] for changes    Chel Naik  Surgery Scheduler

## 2022-10-10 ENCOUNTER — LAB (OUTPATIENT)
Dept: LAB | Facility: CLINIC | Age: 37
End: 2022-10-10
Payer: COMMERCIAL

## 2022-10-10 DIAGNOSIS — R73.02 IMPAIRED GLUCOSE TOLERANCE: ICD-10-CM

## 2022-10-10 LAB
GESTATIONAL GTT 1 HR POST DOSE: 122 MG/DL (ref 60–179)
GESTATIONAL GTT 2 HR POST DOSE: 94 MG/DL (ref 60–154)
GLUCOSE P FAST SERPL-MCNC: 81 MG/DL (ref 60–94)

## 2022-10-10 PROCEDURE — 82952 GTT-ADDED SAMPLES: CPT

## 2022-10-10 PROCEDURE — 36415 COLL VENOUS BLD VENIPUNCTURE: CPT

## 2022-10-10 PROCEDURE — 82951 GLUCOSE TOLERANCE TEST (GTT): CPT

## 2022-10-11 LAB — GESTATIONAL GTT 3 HR POST DOSE: 42 MG/DL (ref 60–139)

## 2022-10-11 NOTE — RESULT ENCOUNTER NOTE
LDVM  No Gestational Diabetes, encouraged small frequent snack/meals to keep blood sugar up and feeling well.   Pt to call back with any concerns.  Zuleyma Franco RN on 10/11/2022 at 10:44 AM

## 2022-10-11 NOTE — RESULT ENCOUNTER NOTE
Please inform of normal 3hr GTT results; 3hr result was low which is not worrisome but please make certain she is feeling okay

## 2022-10-18 ENCOUNTER — PRENATAL OFFICE VISIT (OUTPATIENT)
Dept: OBGYN | Facility: CLINIC | Age: 37
End: 2022-10-18
Payer: COMMERCIAL

## 2022-10-18 VITALS — SYSTOLIC BLOOD PRESSURE: 116 MMHG | BODY MASS INDEX: 22.15 KG/M2 | DIASTOLIC BLOOD PRESSURE: 60 MMHG | WEIGHT: 150 LBS

## 2022-10-18 DIAGNOSIS — Z3A.29 29 WEEKS GESTATION OF PREGNANCY: ICD-10-CM

## 2022-10-18 DIAGNOSIS — O09.529 ANTEPARTUM MULTIGRAVIDA OF ADVANCED MATERNAL AGE: ICD-10-CM

## 2022-10-18 DIAGNOSIS — O09.93 SUPERVISION OF HIGH RISK PREGNANCY IN THIRD TRIMESTER: Primary | ICD-10-CM

## 2022-10-18 PROCEDURE — 99207 PR PRENATAL VISIT: CPT | Performed by: OBSTETRICS & GYNECOLOGY

## 2022-10-18 NOTE — PROGRESS NOTES
Doing well today.  +FM  No ctx/cramping/vb/spotting  No bowel/bladder issues  Leaving for , [place] this weekend --roadtripping with her father and sister; discussed compression stockings  Passed 3hr GTT last week  RTC 2wks

## 2022-11-01 NOTE — TELEPHONE ENCOUNTER
Per Marycruz she is available for this case  Spoke w/Beronica at [facility] for changes to assist    Chel Naik  Surgery Scheduler

## 2022-11-04 ENCOUNTER — PRENATAL OFFICE VISIT (OUTPATIENT)
Dept: OBGYN | Facility: CLINIC | Age: 37
End: 2022-11-04
Payer: COMMERCIAL

## 2022-11-04 VITALS — WEIGHT: 152 LBS | BODY MASS INDEX: 22.45 KG/M2 | SYSTOLIC BLOOD PRESSURE: 110 MMHG | DIASTOLIC BLOOD PRESSURE: 60 MMHG

## 2022-11-04 DIAGNOSIS — O34.219 PREVIOUS CESAREAN DELIVERY, ANTEPARTUM: ICD-10-CM

## 2022-11-04 DIAGNOSIS — Z3A.31 31 WEEKS GESTATION OF PREGNANCY: ICD-10-CM

## 2022-11-04 DIAGNOSIS — Z13.6 ENCOUNTER FOR LIPID SCREENING FOR CARDIOVASCULAR DISEASE: ICD-10-CM

## 2022-11-04 DIAGNOSIS — Z86.16 PERSONAL HISTORY OF COVID-19: ICD-10-CM

## 2022-11-04 DIAGNOSIS — O09.93 SUPERVISION OF HIGH RISK PREGNANCY IN THIRD TRIMESTER: Primary | ICD-10-CM

## 2022-11-04 DIAGNOSIS — Z13.1 SCREENING FOR DIABETES MELLITUS: ICD-10-CM

## 2022-11-04 DIAGNOSIS — O09.523 MULTIGRAVIDA OF ADVANCED MATERNAL AGE IN THIRD TRIMESTER: ICD-10-CM

## 2022-11-04 DIAGNOSIS — Z13.220 ENCOUNTER FOR LIPID SCREENING FOR CARDIOVASCULAR DISEASE: ICD-10-CM

## 2022-11-04 LAB — GLUCOSE BLD-MCNC: 102 MG/DL (ref 60–99)

## 2022-11-04 PROCEDURE — 99207 PR PRENATAL VISIT: CPT | Performed by: OBSTETRICS & GYNECOLOGY

## 2022-11-04 PROCEDURE — 36415 COLL VENOUS BLD VENIPUNCTURE: CPT | Performed by: OBSTETRICS & GYNECOLOGY

## 2022-11-04 PROCEDURE — 82947 ASSAY GLUCOSE BLOOD QUANT: CPT | Performed by: OBSTETRICS & GYNECOLOGY

## 2022-11-04 PROCEDURE — 80061 LIPID PANEL: CPT | Performed by: OBSTETRICS & GYNECOLOGY

## 2022-11-04 NOTE — PROGRESS NOTES
Doing well today.  +FM --very active  No ctx/cramping/vb/spotting  Overall feeling well  Slight constipation but no pain or discomfort  No bladder issues  Had good time in [place] but regretted the long drive  Will check growth at 36wks for hx COVID  -needs biometric screening today for work --non-fasting  RTC 2wks

## 2022-11-05 LAB
CHOLEST SERPL-MCNC: 263 MG/DL
FASTING STATUS PATIENT QL REPORTED: NO
HDLC SERPL-MCNC: 100 MG/DL
LDLC SERPL CALC-MCNC: 130 MG/DL
NONHDLC SERPL-MCNC: 163 MG/DL
TRIGL SERPL-MCNC: 166 MG/DL

## 2022-11-05 NOTE — RESULT ENCOUNTER NOTE
Please inform of results --cholesterol and blood sugar slightly elevated.  Very likely due to the fact that she was not fasting and due to pregnancy; please complete biometrics and submit per pt request

## 2022-11-17 ENCOUNTER — PRENATAL OFFICE VISIT (OUTPATIENT)
Dept: OBGYN | Facility: CLINIC | Age: 37
End: 2022-11-17
Payer: COMMERCIAL

## 2022-11-17 VITALS
HEIGHT: 69 IN | BODY MASS INDEX: 22.93 KG/M2 | WEIGHT: 154.8 LBS | DIASTOLIC BLOOD PRESSURE: 58 MMHG | SYSTOLIC BLOOD PRESSURE: 100 MMHG

## 2022-11-17 DIAGNOSIS — O34.219 PREVIOUS CESAREAN DELIVERY, ANTEPARTUM: ICD-10-CM

## 2022-11-17 DIAGNOSIS — Z86.16 PERSONAL HISTORY OF COVID-19: ICD-10-CM

## 2022-11-17 DIAGNOSIS — Z3A.33 33 WEEKS GESTATION OF PREGNANCY: ICD-10-CM

## 2022-11-17 DIAGNOSIS — O09.93 SUPERVISION OF HIGH RISK PREGNANCY IN THIRD TRIMESTER: Primary | ICD-10-CM

## 2022-11-17 PROCEDURE — 99207 PR PRENATAL VISIT: CPT | Performed by: OBSTETRICS & GYNECOLOGY

## 2022-11-17 NOTE — PROGRESS NOTES
Doing well today.  +FM  No ctx/cramping/vb/spotting  Still running daily!!  No bowel/bladder issues  RTC 2wks --growth us at that time due to hx covid in pregnancy

## 2022-12-01 ENCOUNTER — ANCILLARY PROCEDURE (OUTPATIENT)
Dept: ULTRASOUND IMAGING | Facility: CLINIC | Age: 37
End: 2022-12-01
Attending: OBSTETRICS & GYNECOLOGY
Payer: COMMERCIAL

## 2022-12-01 ENCOUNTER — PRENATAL OFFICE VISIT (OUTPATIENT)
Dept: OBGYN | Facility: CLINIC | Age: 37
End: 2022-12-01
Payer: COMMERCIAL

## 2022-12-01 VITALS — SYSTOLIC BLOOD PRESSURE: 120 MMHG | BODY MASS INDEX: 23.04 KG/M2 | WEIGHT: 156 LBS | DIASTOLIC BLOOD PRESSURE: 68 MMHG

## 2022-12-01 DIAGNOSIS — Z86.16 PERSONAL HISTORY OF COVID-19: ICD-10-CM

## 2022-12-01 DIAGNOSIS — O34.219 PREVIOUS CESAREAN DELIVERY, ANTEPARTUM: ICD-10-CM

## 2022-12-01 DIAGNOSIS — O09.93 SUPERVISION OF HIGH RISK PREGNANCY IN THIRD TRIMESTER: Primary | ICD-10-CM

## 2022-12-01 DIAGNOSIS — Z3A.35 35 WEEKS GESTATION OF PREGNANCY: ICD-10-CM

## 2022-12-01 DIAGNOSIS — O09.523 MULTIGRAVIDA OF ADVANCED MATERNAL AGE IN THIRD TRIMESTER: ICD-10-CM

## 2022-12-01 PROCEDURE — 76816 OB US FOLLOW-UP PER FETUS: CPT | Performed by: OBSTETRICS & GYNECOLOGY

## 2022-12-01 PROCEDURE — 87653 STREP B DNA AMP PROBE: CPT | Performed by: OBSTETRICS & GYNECOLOGY

## 2022-12-01 PROCEDURE — 99207 PR PRENATAL VISIT: CPT | Performed by: OBSTETRICS & GYNECOLOGY

## 2022-12-01 NOTE — PROGRESS NOTES
Doing well today.  +FM  Still feeling lots of pressure but no consistent or painful contractions  No vb/spotting  Growth us today due to history of covid; VTX, EFW 3261g/7-3# (92%ile) but limited measurement of head and abdomen due to fetal position/low station  Labor precautions reviewed  GBS collected  Will discuss c/s and recovery in detail next appt; will also need ERAS packet

## 2022-12-03 LAB — GP B STREP DNA SPEC QL NAA+PROBE: NEGATIVE

## 2022-12-05 NOTE — TELEPHONE ENCOUNTER
Attempted call to pt but VM is full  Mychart msg sent regarding no need for pre procedure covid testing    Chel Naik  Surgery Scheduler

## 2022-12-08 ENCOUNTER — PRENATAL OFFICE VISIT (OUTPATIENT)
Dept: OBGYN | Facility: CLINIC | Age: 37
End: 2022-12-08
Payer: COMMERCIAL

## 2022-12-08 VITALS — WEIGHT: 162 LBS | SYSTOLIC BLOOD PRESSURE: 114 MMHG | DIASTOLIC BLOOD PRESSURE: 70 MMHG | BODY MASS INDEX: 23.92 KG/M2

## 2022-12-08 DIAGNOSIS — O34.219 PREVIOUS CESAREAN DELIVERY, ANTEPARTUM: ICD-10-CM

## 2022-12-08 DIAGNOSIS — O09.523 MULTIGRAVIDA OF ADVANCED MATERNAL AGE IN THIRD TRIMESTER: ICD-10-CM

## 2022-12-08 DIAGNOSIS — O09.93 SUPERVISION OF HIGH RISK PREGNANCY IN THIRD TRIMESTER: Primary | ICD-10-CM

## 2022-12-08 DIAGNOSIS — Z3A.36 36 WEEKS GESTATION OF PREGNANCY: ICD-10-CM

## 2022-12-08 PROCEDURE — 99207 PR PRENATAL VISIT: CPT | Performed by: OBSTETRICS & GYNECOLOGY

## 2022-12-08 NOTE — PROGRESS NOTES
Doing well today.  +FM  No ctx/cramping/vb/spotting  Still feeling a lot of pressure  No bowel/bladder issues  Still running and doing barre exercises  Discussed c/s in detail today including procedure, recovery, etc.  ERAS packet given  RTC 1wk

## 2022-12-15 ENCOUNTER — PRENATAL OFFICE VISIT (OUTPATIENT)
Dept: OBGYN | Facility: CLINIC | Age: 37
End: 2022-12-15
Payer: COMMERCIAL

## 2022-12-15 VITALS — WEIGHT: 159 LBS | BODY MASS INDEX: 23.48 KG/M2 | SYSTOLIC BLOOD PRESSURE: 112 MMHG | DIASTOLIC BLOOD PRESSURE: 68 MMHG

## 2022-12-15 DIAGNOSIS — O09.93 SUPERVISION OF HIGH RISK PREGNANCY IN THIRD TRIMESTER: Primary | ICD-10-CM

## 2022-12-15 DIAGNOSIS — O34.219 PREVIOUS CESAREAN DELIVERY, ANTEPARTUM: ICD-10-CM

## 2022-12-15 DIAGNOSIS — Z3A.37 37 WEEKS GESTATION OF PREGNANCY: ICD-10-CM

## 2022-12-15 DIAGNOSIS — O09.523 MULTIGRAVIDA OF ADVANCED MATERNAL AGE IN THIRD TRIMESTER: ICD-10-CM

## 2022-12-15 PROCEDURE — 99207 PR PRENATAL VISIT: CPT | Performed by: OBSTETRICS & GYNECOLOGY

## 2022-12-15 NOTE — PROGRESS NOTES
Doing well today. +FM  Pressure but no ctx/cramping/vb/spotting/lof  Baby up in her ribs so some SOB; no palpitations or heart arrythmias; paying close attention to pulse on her watch (hx of Afib in labor with first delivery)  No bowel/bladder issues  RTC 1wk

## 2022-12-22 ENCOUNTER — PRENATAL OFFICE VISIT (OUTPATIENT)
Dept: OBGYN | Facility: CLINIC | Age: 37
End: 2022-12-22
Payer: COMMERCIAL

## 2022-12-22 VITALS — DIASTOLIC BLOOD PRESSURE: 68 MMHG | WEIGHT: 161 LBS | BODY MASS INDEX: 23.78 KG/M2 | SYSTOLIC BLOOD PRESSURE: 122 MMHG

## 2022-12-22 DIAGNOSIS — O09.93 SUPERVISION OF HIGH RISK PREGNANCY IN THIRD TRIMESTER: Primary | ICD-10-CM

## 2022-12-22 DIAGNOSIS — O09.523 MULTIGRAVIDA OF ADVANCED MATERNAL AGE IN THIRD TRIMESTER: ICD-10-CM

## 2022-12-22 DIAGNOSIS — Z3A.38 38 WEEKS GESTATION OF PREGNANCY: ICD-10-CM

## 2022-12-22 DIAGNOSIS — O34.219 PREVIOUS CESAREAN DELIVERY, ANTEPARTUM: ICD-10-CM

## 2022-12-22 PROCEDURE — 99207 PR PRENATAL VISIT: CPT | Performed by: OBSTETRICS & GYNECOLOGY

## 2022-12-22 NOTE — PROGRESS NOTES
Doing well today.  +FM  No ctx/cramping/vb/spotting  More pressue and easier breathing this week  A bit more swelling in both ankles --will try compression stockings  Labor precautions reviewed  RLTCS scheduled with me next week 12/28

## 2022-12-27 ENCOUNTER — ANESTHESIA (OUTPATIENT)
Dept: OBGYN | Facility: CLINIC | Age: 37
End: 2022-12-27
Payer: COMMERCIAL

## 2022-12-27 ENCOUNTER — NURSE TRIAGE (OUTPATIENT)
Dept: NURSING | Facility: CLINIC | Age: 37
End: 2022-12-27

## 2022-12-27 ENCOUNTER — SURGERY (OUTPATIENT)
Age: 37
End: 2022-12-27
Payer: COMMERCIAL

## 2022-12-27 ENCOUNTER — ANESTHESIA EVENT (OUTPATIENT)
Dept: OBGYN | Facility: CLINIC | Age: 37
End: 2022-12-27
Payer: COMMERCIAL

## 2022-12-27 ENCOUNTER — HOSPITAL ENCOUNTER (INPATIENT)
Facility: CLINIC | Age: 37
LOS: 2 days | Discharge: HOME | End: 2022-12-29
Attending: OBSTETRICS & GYNECOLOGY | Admitting: OBSTETRICS & GYNECOLOGY
Payer: COMMERCIAL

## 2022-12-27 DIAGNOSIS — I48.0 PAF (PAROXYSMAL ATRIAL FIBRILLATION) (H): ICD-10-CM

## 2022-12-27 DIAGNOSIS — Z98.891 STATUS POST CESAREAN DELIVERY: ICD-10-CM

## 2022-12-27 LAB
ABO/RH(D): NORMAL
ANTIBODY SCREEN: NEGATIVE
CREAT SERPL-MCNC: 0.46 MG/DL (ref 0.52–1.04)
ERYTHROCYTE [DISTWIDTH] IN BLOOD BY AUTOMATED COUNT: 13.2 % (ref 10–15)
GFR SERPL CREATININE-BSD FRML MDRD: >90 ML/MIN/1.73M2
HCT VFR BLD AUTO: 36 % (ref 35–47)
HGB BLD-MCNC: 12.7 G/DL (ref 11.7–15.7)
MCH RBC QN AUTO: 32.5 PG (ref 26.5–33)
MCHC RBC AUTO-ENTMCNC: 35.3 G/DL (ref 31.5–36.5)
MCV RBC AUTO: 92 FL (ref 78–100)
PLATELET # BLD AUTO: 258 10E3/UL (ref 150–450)
RBC # BLD AUTO: 3.91 10E6/UL (ref 3.8–5.2)
SPECIMEN EXPIRATION DATE: NORMAL
T PALLIDUM AB SER QL: NONREACTIVE
WBC # BLD AUTO: 8.7 10E3/UL (ref 4–11)

## 2022-12-27 PROCEDURE — 85027 COMPLETE CBC AUTOMATED: CPT | Performed by: OBSTETRICS & GYNECOLOGY

## 2022-12-27 PROCEDURE — 258N000003 HC RX IP 258 OP 636: Performed by: NURSE ANESTHETIST, CERTIFIED REGISTERED

## 2022-12-27 PROCEDURE — 250N000011 HC RX IP 250 OP 636: Performed by: OBSTETRICS & GYNECOLOGY

## 2022-12-27 PROCEDURE — G0463 HOSPITAL OUTPT CLINIC VISIT: HCPCS

## 2022-12-27 PROCEDURE — 93005 ELECTROCARDIOGRAM TRACING: CPT

## 2022-12-27 PROCEDURE — 250N000013 HC RX MED GY IP 250 OP 250 PS 637: Performed by: OBSTETRICS & GYNECOLOGY

## 2022-12-27 PROCEDURE — 370N000017 HC ANESTHESIA TECHNICAL FEE, PER MIN: Performed by: OBSTETRICS & GYNECOLOGY

## 2022-12-27 PROCEDURE — 82565 ASSAY OF CREATININE: CPT | Performed by: OBSTETRICS & GYNECOLOGY

## 2022-12-27 PROCEDURE — 710N000010 HC RECOVERY PHASE 1, LEVEL 2, PER MIN: Performed by: OBSTETRICS & GYNECOLOGY

## 2022-12-27 PROCEDURE — 99222 1ST HOSP IP/OBS MODERATE 55: CPT | Mod: FS | Performed by: NURSE PRACTITIONER

## 2022-12-27 PROCEDURE — 250N000013 HC RX MED GY IP 250 OP 250 PS 637

## 2022-12-27 PROCEDURE — 86780 TREPONEMA PALLIDUM: CPT | Performed by: OBSTETRICS & GYNECOLOGY

## 2022-12-27 PROCEDURE — 250N000009 HC RX 250: Performed by: OBSTETRICS & GYNECOLOGY

## 2022-12-27 PROCEDURE — 59025 FETAL NON-STRESS TEST: CPT

## 2022-12-27 PROCEDURE — 250N000011 HC RX IP 250 OP 636: Performed by: NURSE ANESTHETIST, CERTIFIED REGISTERED

## 2022-12-27 PROCEDURE — 360N000076 HC SURGERY LEVEL 3, PER MIN: Performed by: OBSTETRICS & GYNECOLOGY

## 2022-12-27 PROCEDURE — 36415 COLL VENOUS BLD VENIPUNCTURE: CPT | Performed by: OBSTETRICS & GYNECOLOGY

## 2022-12-27 PROCEDURE — 59510 CESAREAN DELIVERY: CPT | Performed by: OBSTETRICS & GYNECOLOGY

## 2022-12-27 PROCEDURE — 250N000011 HC RX IP 250 OP 636: Performed by: ANESTHESIOLOGY

## 2022-12-27 PROCEDURE — 272N000001 HC OR GENERAL SUPPLY STERILE: Performed by: OBSTETRICS & GYNECOLOGY

## 2022-12-27 PROCEDURE — 258N000003 HC RX IP 258 OP 636: Performed by: OBSTETRICS & GYNECOLOGY

## 2022-12-27 PROCEDURE — 120N000001 HC R&B MED SURG/OB

## 2022-12-27 PROCEDURE — 86901 BLOOD TYPING SEROLOGIC RH(D): CPT | Performed by: OBSTETRICS & GYNECOLOGY

## 2022-12-27 PROCEDURE — 59514 CESAREAN DELIVERY ONLY: CPT | Mod: 80 | Performed by: STUDENT IN AN ORGANIZED HEALTH CARE EDUCATION/TRAINING PROGRAM

## 2022-12-27 PROCEDURE — 93010 ELECTROCARDIOGRAM REPORT: CPT | Performed by: INTERNAL MEDICINE

## 2022-12-27 RX ORDER — CARBOPROST TROMETHAMINE 250 UG/ML
250 INJECTION, SOLUTION INTRAMUSCULAR
Status: DISCONTINUED | OUTPATIENT
Start: 2022-12-27 | End: 2022-12-29 | Stop reason: HOSPADM

## 2022-12-27 RX ORDER — ONDANSETRON 2 MG/ML
4 INJECTION INTRAMUSCULAR; INTRAVENOUS EVERY 6 HOURS PRN
Status: DISCONTINUED | OUTPATIENT
Start: 2022-12-27 | End: 2022-12-29 | Stop reason: HOSPADM

## 2022-12-27 RX ORDER — PROCHLORPERAZINE 25 MG
25 SUPPOSITORY, RECTAL RECTAL EVERY 12 HOURS PRN
Status: DISCONTINUED | OUTPATIENT
Start: 2022-12-27 | End: 2022-12-27

## 2022-12-27 RX ORDER — NALOXONE HYDROCHLORIDE 0.4 MG/ML
0.2 INJECTION, SOLUTION INTRAMUSCULAR; INTRAVENOUS; SUBCUTANEOUS
Status: DISCONTINUED | OUTPATIENT
Start: 2022-12-27 | End: 2022-12-29 | Stop reason: HOSPADM

## 2022-12-27 RX ORDER — PROCHLORPERAZINE MALEATE 10 MG
10 TABLET ORAL EVERY 6 HOURS PRN
Status: DISCONTINUED | OUTPATIENT
Start: 2022-12-27 | End: 2022-12-29 | Stop reason: HOSPADM

## 2022-12-27 RX ORDER — ONDANSETRON 2 MG/ML
INJECTION INTRAMUSCULAR; INTRAVENOUS PRN
Status: DISCONTINUED | OUTPATIENT
Start: 2022-12-27 | End: 2022-12-27

## 2022-12-27 RX ORDER — ONDANSETRON 4 MG/1
4 TABLET, ORALLY DISINTEGRATING ORAL EVERY 6 HOURS PRN
Status: DISCONTINUED | OUTPATIENT
Start: 2022-12-27 | End: 2022-12-27

## 2022-12-27 RX ORDER — AMOXICILLIN 250 MG
1 CAPSULE ORAL 2 TIMES DAILY
Status: DISCONTINUED | OUTPATIENT
Start: 2022-12-27 | End: 2022-12-29 | Stop reason: HOSPADM

## 2022-12-27 RX ORDER — METHYLERGONOVINE MALEATE 0.2 MG/ML
200 INJECTION INTRAVENOUS
Status: DISCONTINUED | OUTPATIENT
Start: 2022-12-27 | End: 2022-12-27

## 2022-12-27 RX ORDER — SODIUM CHLORIDE, SODIUM LACTATE, POTASSIUM CHLORIDE, CALCIUM CHLORIDE 600; 310; 30; 20 MG/100ML; MG/100ML; MG/100ML; MG/100ML
INJECTION, SOLUTION INTRAVENOUS CONTINUOUS
Status: DISCONTINUED | OUTPATIENT
Start: 2022-12-27 | End: 2022-12-27

## 2022-12-27 RX ORDER — CEFAZOLIN SODIUM/WATER 2 G/20 ML
2 SYRINGE (ML) INTRAVENOUS SEE ADMIN INSTRUCTIONS
Status: DISCONTINUED | OUTPATIENT
Start: 2022-12-27 | End: 2022-12-27

## 2022-12-27 RX ORDER — MISOPROSTOL 200 UG/1
400 TABLET ORAL
Status: DISCONTINUED | OUTPATIENT
Start: 2022-12-27 | End: 2022-12-29 | Stop reason: HOSPADM

## 2022-12-27 RX ORDER — IBUPROFEN 400 MG/1
800 TABLET, FILM COATED ORAL EVERY 6 HOURS
Status: DISCONTINUED | OUTPATIENT
Start: 2022-12-28 | End: 2022-12-29 | Stop reason: HOSPADM

## 2022-12-27 RX ORDER — ACETAMINOPHEN 325 MG/1
975 TABLET ORAL ONCE
Status: COMPLETED | OUTPATIENT
Start: 2022-12-27 | End: 2022-12-27

## 2022-12-27 RX ORDER — METOCLOPRAMIDE HYDROCHLORIDE 5 MG/ML
10 INJECTION INTRAMUSCULAR; INTRAVENOUS EVERY 6 HOURS PRN
Status: DISCONTINUED | OUTPATIENT
Start: 2022-12-27 | End: 2022-12-29 | Stop reason: HOSPADM

## 2022-12-27 RX ORDER — SIMETHICONE 80 MG
80 TABLET,CHEWABLE ORAL 4 TIMES DAILY PRN
Status: DISCONTINUED | OUTPATIENT
Start: 2022-12-27 | End: 2022-12-29 | Stop reason: HOSPADM

## 2022-12-27 RX ORDER — TRANEXAMIC ACID 10 MG/ML
1 INJECTION, SOLUTION INTRAVENOUS EVERY 30 MIN PRN
Status: DISCONTINUED | OUTPATIENT
Start: 2022-12-27 | End: 2022-12-29 | Stop reason: HOSPADM

## 2022-12-27 RX ORDER — ONDANSETRON 2 MG/ML
4 INJECTION INTRAMUSCULAR; INTRAVENOUS EVERY 6 HOURS PRN
Status: DISCONTINUED | OUTPATIENT
Start: 2022-12-27 | End: 2022-12-27

## 2022-12-27 RX ORDER — AMOXICILLIN 250 MG
2 CAPSULE ORAL 2 TIMES DAILY
Status: DISCONTINUED | OUTPATIENT
Start: 2022-12-27 | End: 2022-12-29 | Stop reason: HOSPADM

## 2022-12-27 RX ORDER — OXYTOCIN 10 [USP'U]/ML
10 INJECTION, SOLUTION INTRAMUSCULAR; INTRAVENOUS
Status: DISCONTINUED | OUTPATIENT
Start: 2022-12-27 | End: 2022-12-27

## 2022-12-27 RX ORDER — AZITHROMYCIN 500 MG/1
INJECTION, POWDER, LYOPHILIZED, FOR SOLUTION INTRAVENOUS
Status: DISCONTINUED
Start: 2022-12-27 | End: 2022-12-27 | Stop reason: HOSPADM

## 2022-12-27 RX ORDER — OXYTOCIN 10 [USP'U]/ML
10 INJECTION, SOLUTION INTRAMUSCULAR; INTRAVENOUS
Status: DISCONTINUED | OUTPATIENT
Start: 2022-12-27 | End: 2022-12-29 | Stop reason: HOSPADM

## 2022-12-27 RX ORDER — NALOXONE HYDROCHLORIDE 0.4 MG/ML
0.4 INJECTION, SOLUTION INTRAMUSCULAR; INTRAVENOUS; SUBCUTANEOUS
Status: DISCONTINUED | OUTPATIENT
Start: 2022-12-27 | End: 2022-12-29 | Stop reason: HOSPADM

## 2022-12-27 RX ORDER — OXYTOCIN/0.9 % SODIUM CHLORIDE 30/500 ML
100-340 PLASTIC BAG, INJECTION (ML) INTRAVENOUS CONTINUOUS PRN
Status: DISCONTINUED | OUTPATIENT
Start: 2022-12-27 | End: 2022-12-27

## 2022-12-27 RX ORDER — METOCLOPRAMIDE HYDROCHLORIDE 5 MG/ML
10 INJECTION INTRAMUSCULAR; INTRAVENOUS EVERY 6 HOURS PRN
Status: DISCONTINUED | OUTPATIENT
Start: 2022-12-27 | End: 2022-12-27

## 2022-12-27 RX ORDER — LIDOCAINE 40 MG/G
CREAM TOPICAL
Status: DISCONTINUED | OUTPATIENT
Start: 2022-12-27 | End: 2022-12-27

## 2022-12-27 RX ORDER — BISACODYL 10 MG
10 SUPPOSITORY, RECTAL RECTAL DAILY PRN
Status: DISCONTINUED | OUTPATIENT
Start: 2022-12-29 | End: 2022-12-29 | Stop reason: HOSPADM

## 2022-12-27 RX ORDER — CEFAZOLIN SODIUM/WATER 2 G/20 ML
SYRINGE (ML) INTRAVENOUS
Status: COMPLETED
Start: 2022-12-27 | End: 2022-12-27

## 2022-12-27 RX ORDER — NALBUPHINE HYDROCHLORIDE 10 MG/ML
2.5-5 INJECTION, SOLUTION INTRAMUSCULAR; INTRAVENOUS; SUBCUTANEOUS EVERY 6 HOURS PRN
Status: DISCONTINUED | OUTPATIENT
Start: 2022-12-27 | End: 2022-12-27

## 2022-12-27 RX ORDER — MODIFIED LANOLIN
OINTMENT (GRAM) TOPICAL
Status: DISCONTINUED | OUTPATIENT
Start: 2022-12-27 | End: 2022-12-29 | Stop reason: HOSPADM

## 2022-12-27 RX ORDER — HYDROCORTISONE 25 MG/G
CREAM TOPICAL 3 TIMES DAILY PRN
Status: DISCONTINUED | OUTPATIENT
Start: 2022-12-27 | End: 2022-12-29 | Stop reason: HOSPADM

## 2022-12-27 RX ORDER — ACETAMINOPHEN 325 MG/1
975 TABLET ORAL EVERY 6 HOURS
Status: DISCONTINUED | OUTPATIENT
Start: 2022-12-27 | End: 2022-12-29 | Stop reason: HOSPADM

## 2022-12-27 RX ORDER — METOCLOPRAMIDE 10 MG/1
10 TABLET ORAL EVERY 6 HOURS PRN
Status: DISCONTINUED | OUTPATIENT
Start: 2022-12-27 | End: 2022-12-27

## 2022-12-27 RX ORDER — KETOROLAC TROMETHAMINE 30 MG/ML
30 INJECTION, SOLUTION INTRAMUSCULAR; INTRAVENOUS EVERY 6 HOURS
Status: COMPLETED | OUTPATIENT
Start: 2022-12-27 | End: 2022-12-28

## 2022-12-27 RX ORDER — DEXTROSE, SODIUM CHLORIDE, SODIUM LACTATE, POTASSIUM CHLORIDE, AND CALCIUM CHLORIDE 5; .6; .31; .03; .02 G/100ML; G/100ML; G/100ML; G/100ML; G/100ML
INJECTION, SOLUTION INTRAVENOUS CONTINUOUS
Status: DISCONTINUED | OUTPATIENT
Start: 2022-12-27 | End: 2022-12-29 | Stop reason: HOSPADM

## 2022-12-27 RX ORDER — PROCHLORPERAZINE 25 MG
25 SUPPOSITORY, RECTAL RECTAL EVERY 12 HOURS PRN
Status: DISCONTINUED | OUTPATIENT
Start: 2022-12-27 | End: 2022-12-29 | Stop reason: HOSPADM

## 2022-12-27 RX ORDER — ONDANSETRON 4 MG/1
4 TABLET, ORALLY DISINTEGRATING ORAL EVERY 6 HOURS PRN
Status: DISCONTINUED | OUTPATIENT
Start: 2022-12-27 | End: 2022-12-29 | Stop reason: HOSPADM

## 2022-12-27 RX ORDER — MORPHINE SULFATE 1 MG/ML
150 INJECTION, SOLUTION EPIDURAL; INTRATHECAL; INTRAVENOUS ONCE
Status: DISCONTINUED | OUTPATIENT
Start: 2022-12-27 | End: 2022-12-27

## 2022-12-27 RX ORDER — ENOXAPARIN SODIUM 100 MG/ML
40 INJECTION SUBCUTANEOUS EVERY 24 HOURS
Status: DISCONTINUED | OUTPATIENT
Start: 2022-12-27 | End: 2022-12-27

## 2022-12-27 RX ORDER — OXYCODONE HYDROCHLORIDE 5 MG/1
5 TABLET ORAL EVERY 4 HOURS PRN
Status: DISCONTINUED | OUTPATIENT
Start: 2022-12-27 | End: 2022-12-29 | Stop reason: HOSPADM

## 2022-12-27 RX ORDER — BUPIVACAINE HYDROCHLORIDE 7.5 MG/ML
INJECTION, SOLUTION INTRASPINAL
Status: COMPLETED | OUTPATIENT
Start: 2022-12-27 | End: 2022-12-27

## 2022-12-27 RX ORDER — PROCHLORPERAZINE MALEATE 5 MG
10 TABLET ORAL EVERY 6 HOURS PRN
Status: DISCONTINUED | OUTPATIENT
Start: 2022-12-27 | End: 2022-12-27

## 2022-12-27 RX ORDER — CITRIC ACID/SODIUM CITRATE 334-500MG
SOLUTION, ORAL ORAL
Status: COMPLETED
Start: 2022-12-27 | End: 2022-12-27

## 2022-12-27 RX ORDER — LIDOCAINE 40 MG/G
CREAM TOPICAL
Status: DISCONTINUED | OUTPATIENT
Start: 2022-12-27 | End: 2022-12-29 | Stop reason: HOSPADM

## 2022-12-27 RX ORDER — BREAST PUMP
1 EACH MISCELLANEOUS ONCE
Qty: 1 EACH | Refills: 0 | Status: SHIPPED | OUTPATIENT
Start: 2022-12-27 | End: 2022-12-27

## 2022-12-27 RX ORDER — METHYLERGONOVINE MALEATE 0.2 MG/ML
200 INJECTION INTRAVENOUS
Status: DISCONTINUED | OUTPATIENT
Start: 2022-12-27 | End: 2022-12-29 | Stop reason: HOSPADM

## 2022-12-27 RX ORDER — CITRIC ACID/SODIUM CITRATE 334-500MG
30 SOLUTION, ORAL ORAL
Status: DISCONTINUED | OUTPATIENT
Start: 2022-12-27 | End: 2022-12-27

## 2022-12-27 RX ORDER — MISOPROSTOL 200 UG/1
800 TABLET ORAL
Status: DISCONTINUED | OUTPATIENT
Start: 2022-12-27 | End: 2022-12-29 | Stop reason: HOSPADM

## 2022-12-27 RX ORDER — MISOPROSTOL 200 UG/1
400 TABLET ORAL
Status: DISCONTINUED | OUTPATIENT
Start: 2022-12-27 | End: 2022-12-27

## 2022-12-27 RX ORDER — OXYTOCIN/0.9 % SODIUM CHLORIDE 30/500 ML
340 PLASTIC BAG, INJECTION (ML) INTRAVENOUS CONTINUOUS PRN
Status: DISCONTINUED | OUTPATIENT
Start: 2022-12-27 | End: 2022-12-29 | Stop reason: HOSPADM

## 2022-12-27 RX ORDER — CARBOPROST TROMETHAMINE 250 UG/ML
250 INJECTION, SOLUTION INTRAMUSCULAR
Status: DISCONTINUED | OUTPATIENT
Start: 2022-12-27 | End: 2022-12-27

## 2022-12-27 RX ORDER — TRANEXAMIC ACID 10 MG/ML
1 INJECTION, SOLUTION INTRAVENOUS EVERY 30 MIN PRN
Status: DISCONTINUED | OUTPATIENT
Start: 2022-12-27 | End: 2022-12-27

## 2022-12-27 RX ORDER — AZITHROMYCIN 500 MG/1
500 INJECTION, POWDER, LYOPHILIZED, FOR SOLUTION INTRAVENOUS
Status: COMPLETED | OUTPATIENT
Start: 2022-12-27 | End: 2022-12-27

## 2022-12-27 RX ORDER — METOCLOPRAMIDE 10 MG/1
10 TABLET ORAL EVERY 6 HOURS PRN
Status: DISCONTINUED | OUTPATIENT
Start: 2022-12-27 | End: 2022-12-29 | Stop reason: HOSPADM

## 2022-12-27 RX ORDER — ACETAMINOPHEN 325 MG/1
TABLET ORAL
Status: COMPLETED
Start: 2022-12-27 | End: 2022-12-27

## 2022-12-27 RX ORDER — CEFAZOLIN SODIUM/WATER 2 G/20 ML
2 SYRINGE (ML) INTRAVENOUS
Status: COMPLETED | OUTPATIENT
Start: 2022-12-27 | End: 2022-12-27

## 2022-12-27 RX ORDER — MORPHINE SULFATE 1 MG/ML
INJECTION, SOLUTION EPIDURAL; INTRATHECAL; INTRAVENOUS
Status: COMPLETED | OUTPATIENT
Start: 2022-12-27 | End: 2022-12-27

## 2022-12-27 RX ORDER — KETOROLAC TROMETHAMINE 30 MG/ML
INJECTION, SOLUTION INTRAMUSCULAR; INTRAVENOUS PRN
Status: DISCONTINUED | OUTPATIENT
Start: 2022-12-27 | End: 2022-12-27

## 2022-12-27 RX ORDER — MISOPROSTOL 200 UG/1
800 TABLET ORAL
Status: DISCONTINUED | OUTPATIENT
Start: 2022-12-27 | End: 2022-12-27

## 2022-12-27 RX ORDER — OXYTOCIN/0.9 % SODIUM CHLORIDE 30/500 ML
340 PLASTIC BAG, INJECTION (ML) INTRAVENOUS CONTINUOUS PRN
Status: DISCONTINUED | OUTPATIENT
Start: 2022-12-27 | End: 2022-12-27

## 2022-12-27 RX ADMIN — ACETAMINOPHEN 975 MG: 325 TABLET ORAL at 06:28

## 2022-12-27 RX ADMIN — BUPIVACAINE HYDROCHLORIDE IN DEXTROSE 1.4 ML: 7.5 INJECTION, SOLUTION SUBARACHNOID at 06:44

## 2022-12-27 RX ADMIN — PHENYLEPHRINE HYDROCHLORIDE 150 MCG: 10 INJECTION INTRAVENOUS at 06:56

## 2022-12-27 RX ADMIN — PHENYLEPHRINE HYDROCHLORIDE 100 MCG: 10 INJECTION INTRAVENOUS at 06:44

## 2022-12-27 RX ADMIN — SODIUM CITRATE AND CITRIC ACID MONOHYDRATE 30 ML: 500; 334 SOLUTION ORAL at 06:28

## 2022-12-27 RX ADMIN — KETOROLAC TROMETHAMINE 30 MG: 30 INJECTION, SOLUTION INTRAMUSCULAR at 07:21

## 2022-12-27 RX ADMIN — ONDANSETRON 4 MG: 2 INJECTION INTRAMUSCULAR; INTRAVENOUS at 07:04

## 2022-12-27 RX ADMIN — ACETAMINOPHEN 975 MG: 325 TABLET, FILM COATED ORAL at 06:28

## 2022-12-27 RX ADMIN — SODIUM CHLORIDE, POTASSIUM CHLORIDE, SODIUM LACTATE AND CALCIUM CHLORIDE: 600; 310; 30; 20 INJECTION, SOLUTION INTRAVENOUS at 06:36

## 2022-12-27 RX ADMIN — PHENYLEPHRINE HYDROCHLORIDE 0.5 MCG/KG/MIN: 10 INJECTION INTRAVENOUS at 06:44

## 2022-12-27 RX ADMIN — ACETAMINOPHEN 975 MG: 325 TABLET, FILM COATED ORAL at 12:32

## 2022-12-27 RX ADMIN — PHENYLEPHRINE HYDROCHLORIDE 150 MCG: 10 INJECTION INTRAVENOUS at 06:46

## 2022-12-27 RX ADMIN — Medication 2 G: at 06:37

## 2022-12-27 RX ADMIN — AZITHROMYCIN MONOHYDRATE 500 MG: 500 INJECTION, POWDER, LYOPHILIZED, FOR SOLUTION INTRAVENOUS at 06:44

## 2022-12-27 RX ADMIN — Medication 340 ML/HR: at 07:03

## 2022-12-27 RX ADMIN — MORPHINE SULFATE 0.15 MG: 1 INJECTION, SOLUTION EPIDURAL; INTRATHECAL; INTRAVENOUS at 06:44

## 2022-12-27 RX ADMIN — KETOROLAC TROMETHAMINE 30 MG: 30 INJECTION, SOLUTION INTRAMUSCULAR; INTRAVENOUS at 13:55

## 2022-12-27 RX ADMIN — KETOROLAC TROMETHAMINE 30 MG: 30 INJECTION, SOLUTION INTRAMUSCULAR; INTRAVENOUS at 20:08

## 2022-12-27 RX ADMIN — SENNOSIDES AND DOCUSATE SODIUM 1 TABLET: 50; 8.6 TABLET ORAL at 20:07

## 2022-12-27 RX ADMIN — ACETAMINOPHEN 975 MG: 325 TABLET, FILM COATED ORAL at 18:37

## 2022-12-27 ASSESSMENT — ACTIVITIES OF DAILY LIVING (ADL)
ADLS_ACUITY_SCORE: 35
ADLS_ACUITY_SCORE: 20
VISION_MANAGEMENT: SELF
DRESSING/BATHING_DIFFICULTY: NO
DIFFICULTY_EATING/SWALLOWING: NO
FALL_HISTORY_WITHIN_LAST_SIX_MONTHS: NO
DOING_ERRANDS_INDEPENDENTLY_DIFFICULTY: NO
ADLS_ACUITY_SCORE: 20
ADLS_ACUITY_SCORE: 20
HEARING_DIFFICULTY_OR_DEAF: NO
ADLS_ACUITY_SCORE: 20
TOILETING_ISSUES: NO
ADLS_ACUITY_SCORE: 20
DIFFICULTY_COMMUNICATING: NO
CHANGE_IN_FUNCTIONAL_STATUS_SINCE_ONSET_OF_CURRENT_ILLNESS/INJURY: NO
WEAR_GLASSES_OR_BLIND: YES
ADLS_ACUITY_SCORE: 20
WALKING_OR_CLIMBING_STAIRS_DIFFICULTY: NO
CONCENTRATING,_REMEMBERING_OR_MAKING_DECISIONS_DIFFICULTY: NO
ADLS_ACUITY_SCORE: 20
ADLS_ACUITY_SCORE: 21

## 2022-12-27 NOTE — TELEPHONE ENCOUNTER
OB Triage Call      Is patient's OB/Midwife with the [facility] or [facility]? [facility]- Proceed with triage     Reason for call: Contractions    Assessment: Patient is calling to report contractions that are now every 5 mins apart.  This is her second baby.  She does have a planned c section on .    Plan: Writer to call and give report to L&D nurse.  Patient was wondering she needed to shower with the special soap she was given before going in.  Informed patient that per guidelines, she shouldn't delay going and will be instructed to do the shower there if necessary.     Patient plans to deliver at [facility]    Patient's primary OB Provider is Dr. Natalee Farris.      Per protocol recommendations Patient to be evaluated in L&D. Patient's primary OB is [facility].  Labor and delivery at [facility] ([phone]) notified of patient's pending arrival.  Report given to Em.      Is patient's delivering hospital on divert? No      39w3d    Estimated Date of Delivery: Dec 31, 2022        OB History    Para Term  AB Living   2 1 1 0 0 1   SAB IAB Ectopic Multiple Live Births   0 0 0 0 1      # Outcome Date GA Lbr Kyle/2nd Weight Sex Delivery Anes PTL Lv   2 Current            1 Term 19 40w0d 17:00 / 04:29 3.7 kg (8 lb 2.5 oz) M CS-LTranv EPI N BETINA      Complications: Failure to Progress in Second Stage, Cephalopelvic Disproportion, Paroxysmal atrial fibrillation (H)      Name: Kristyn      Apgar1: 8  Apgar5: 9       Lab Results   Component Value Date    GBS Negative 2019          Janae Boyd RN 22 4:32 AM  [facility]      Reason for Disposition    [1] History of prior delivery (multipara) AND [2] contractions < 10 minutes apart AND [3] present 1 hour    Additional Information    Negative: Passed out (i.e., lost consciousness, collapsed and was not responding)    Negative: Shock suspected (e.g., cold/pale/clammy skin, too weak to stand, low BP, rapid  "pulse)    Negative: Difficult to awaken or acting confused (e.g., disoriented, slurred speech)    Negative: [1] SEVERE abdominal pain (e.g., excruciating) AND [2] constant AND [3] present > 1 hour    Negative: Severe bleeding (e.g., continuous red blood from vagina, or large blood clots)    Negative: Umbilical cord hanging out of the vagina (shiny, white, curled appearance, \"like telephone cord\")    Negative: Can see baby    Negative: Uncontrollable urge to push (i.e., feels like baby is coming out now)    Negative: Sounds like a life-threatening emergency to the triager    Negative: [1] First baby (primipara) AND [2] contractions < 6 minutes apart  AND [3] present 2 hours    Protocols used: PREGNANCY - LABOR-A-AH      "

## 2022-12-27 NOTE — PLAN OF CARE
Patient transported by cart to Labor and Delivery, room 235.  and  in room awaiting patient's arrival. Patient oriented to room, call light and plan of care.

## 2022-12-27 NOTE — PLAN OF CARE
This RN assumed care of patient at 0705 in L&D OR. Charting completed under previous RN. Surgery intra-op charting completed by DALTON Kennedy RN.

## 2022-12-27 NOTE — H&P
[facility]    History and Physical  Obstetrics and Gynecology     Date of Admission:  2022    Assessment & Plan   Debbi Grossman is a 37 year old female who presents for Repeat  section.     ASSESSMENT:   IUP @ 39w3d  Labor      PLAN:   Scheduled for  section.  No questions.      Natalee Farris MD    History of Present Illness   Debbi Grossman is a 37 year old female  39w3d  Estimated Date of Delivery: Dec 31, 2022 is calculated from Patient's last menstrual period was 2022. [facility].  Admitted in active labor at 6cm.  Scheduled repeat c/s .  Pregnancy overall uncomplicated    PRENATAL COURSE  Prenatal course was essentially uncomplicated      Recent Labs   Lab Test 22  0855 19  0352   ABO  --  A   RH  --  Pos   AS Negative Neg     Rhogam not indicated   Recent Labs   Lab Test 22  1700   HEPBANG Nonreactive  --    HIAGAB Nonreactive  --    GBS  --  Negative   RUQIGG Positive  --          Prior to Admission Medications   Prior to Admission Medications   Prescriptions Last Dose Informant Patient Reported? Taking?   Docosahexaenoic Acid (DHA PO) 2022  Yes Yes   Prenatal Multivit-Min-Fe-FA (PRENATAL VITAMINS PO) 2022  Yes Yes   Sig: Take 1 tablet by mouth   senna-docusate (SENOKOT-S/PERICOLACE) 8.6-50 MG tablet 2022  Yes Yes   Sig: Take 1 tablet by mouth daily      Facility-Administered Medications: None     Allergies   Allergies   Allergen Reactions     Seasonal Allergies          Immunization History   Immunization History   Administered Date(s) Administered     COVID-19 Vaccine 12+ (Pfizer) 2021, 04/15/2021, 2021     COVID-19 Vaccine Bivalent Booster 12+ (Pfizer) 2022     DTaP, Unspecified 1986, 1986, 10/17/1986, 1988, 1991     HPV 2007, 2011     HPV Quadrivalent 2009, 2011     Hep  B, Peds or Adolescent 1998, 10/22/1999, 2000     HepA-Adult 2017, 2018     HepB 1998, 10/22/1999, 2000     HepB-Adult 1998, 10/22/1999, 2000     Hib (PRP-T) 1988     Historical DTP/aP 1986, 1986, 10/17/1986, 1988, 1991     Influenza Vaccine >6 months (Alfuria,Fluzone) 2017, 10/10/2017, 10/26/2018, 11/10/2020, 2021, 2022     Influenza,INJ,MDCK,PF,Quad >6mo(Flucelvax) 10/26/2018     MMR 10/09/1987, 1998     Meningococcal (Menomune ) 2004     OPV, trivalent, live 1986, 1986, 1988, 1991, 10/24/1994     OPV, unspecified 1986, 1986, 1988, 1991, 10/24/1994     TD (ADULT, 7+) 1998     TDAP Vaccine (Adacel) 2011, 2011, 2019     TDAP Vaccine (Boostrix) 2011     Td (Adult), Adsorbed 1998     Tdap (Adacel,Boostrix) 10/04/2022     Typhoid IM 2017       Past Medical History:   Diagnosis Date     Acute blood loss anemia 2019     Anxiety     Therapy--primarily due to last delivery and fear around this delivery     CP (cerebral palsy) (H)      History of blood transfusion      Infection due to 2019 novel coronavirus 2022    cough     PAF (paroxysmal atrial fibrillation) (H)      Scoliosis 05/10/2012     Seasonal allergies      Status post  delivery 2019       Past Surgical History:   Procedure Laterality Date      SECTION N/A 2019    Procedure:  SECTION;  Surgeon: Natalee Farris MD;  Location:  L+D     CYSTOSCOPY N/A 2019    Procedure: CYSTOSCOPY;  Surgeon: Natalee Farris MD;  Location: [facility] OR     CYSTOSCOPY, CYSTOGRAM, COMBINED N/A 2019    Procedure: Cystoscopy, cystogram, combined;  Surgeon: Pedro Luis Mireles MD;  Location: [facility] OR     CYSTOSCOPY, RETROGRADES, INSERT STENT URETER(S), COMBINED  2019    Procedure: CYSTOSCOPY WITH BILATERAL RETROGRADES/ BILATERAL  STENT PLACEMENT AND REMOVAL/ CYSTOGRAM/ BLADDER REPAIR;  Surgeon: Pedro Luis Mireles MD;  Location:  OR      REMOVE TONSILS/ADENOIDS,<13 Y/O       HC TOOTH EXTRACTION W/FORCEP      [place] Teeth     LAPAROTOMY EXPLORATORY N/A 8/23/2019    Procedure: EXPLORATORY LAPAROTOMY WITH REPEAT EXPLORATORY LAPAROTOMY WITH BLADDER REPAIR;  Surgeon: Natalee Farris MD;  Location:  OR     REPAIR BLADDER N/A 8/23/2019    Procedure: REPAIR, BLADDER;  Surgeon: Natalee Farris MD;  Location:  OR       Clinic vitals from today:  /71     Abdomen: gravid, single vertex fetus, non-tender, EFW 8 lbs    Constitutional: healthy, alert, active and no distress   Extremities: NT, no edema  Neurologic: Awake, alert, oriented x3  Neuropsychiatric: General: normal, calm and normal eye contact  Heart: Regular rate and rhythm  Lungs: clear to ausculation bilaterally    Natalee Farris MD

## 2022-12-27 NOTE — PLAN OF CARE
IUP 39+3 wks AOG  patient admitted to [facility]. Prenatal record reviewed.   OB History    Para Term  AB Living   2 1 1 0 0 1   SAB IAB Ectopic Multiple Live Births   0 0 0 0 1      # Outcome Date GA Lbr Kyle/2nd Weight Sex Delivery Anes PTL Lv   2 Current            1 Term 19 40w0d 17:00 / 04:29 3.7 kg (8 lb 2.5 oz) M CS-LTranv EPI N BETINA      Complications: Failure to Progress in Second Stage, Cephalopelvic Disproportion, Paroxysmal atrial fibrillation (H)      Name: Kristyn      Apgar1: 8  Apgar5: 9   .  Medical History:   Past Medical History:   Diagnosis Date    Acute blood loss anemia 2019    Anxiety     Therapy--primarily due to last delivery and fear around this delivery    CP (cerebral palsy) (H)     History of blood transfusion     Infection due to  novel coronavirus 2022    cough    PAF (paroxysmal atrial fibrillation) (H)     Scoliosis 05/10/2012    Seasonal allergies     Status post  delivery 2019   Vital signs per doc flowsheet. Fetal movement present. Patient reports Scheduled  Section and Rule Out Labor   as reason for admission. Support persons Braeden present. Verbal consent for EFM, external fetal monitors applied. Admission assessment completed. Patient and support persons educated on labor process. Patient instructed to report change in fetal movement, contractions, vaginal leaking of fluid or bleeding, abdominal pain, or any concerns related to the pregnancy to her nurse/physician. Patient oriented to room, call light in reach. Dr. Farris informed of admission. SVE. Plan per provider is admit patient, schedule for RCS. Patient verbalized understanding of education and verbalized agreement with plan. Patient coping with labor via.

## 2022-12-27 NOTE — ANESTHESIA CARE TRANSFER NOTE
Patient: Debbi Grossman    Procedure: Procedure(s):  REPEAT  SECTION       Diagnosis: Previous  delivery, antepartum [O34.219]  Diagnosis Additional Information: No value filed.    Anesthesia Type:   Spinal     Note:    Oropharynx: oropharynx clear of all foreign objects and spontaneously breathing  Level of Consciousness: awake  Oxygen Supplementation: room air    Independent Airway: airway patency satisfactory and stable  Dentition: dentition unchanged  Vital Signs Stable: post-procedure vital signs reviewed and stable  Report to RN Given: handoff report given  Patient transferred to: PACU  Comments: At end of procedure, spontaneous respirations, patient alert to voice, able to follow commands. Patient breathing room air at room air to PACU. Oxygen tubing connected to wall O2 in PACU, SpO2, NiBP, and EKG monitors and alarms on and functioning, Davonte Hugger warmer connected to patient gown, report on patient's clinical status given to PACU RN, RN questions answered.  Handoff Report: Identifed the Patient, Identified the Reponsible Provider, Reviewed the pertinent medical history, Discussed the surgical course, Reviewed Intra-OP anesthesia mangement and issues during anesthesia, Set expectations for post-procedure period and Allowed opportunity for questions and acknowledgement of understanding      Vitals:  Vitals Value Taken Time   BP 98/69 22 0800   Temp 35.8  C (96.5  F) 22 0755   Pulse 89 22 0810   Resp 20 22 0810   SpO2 96 % 22 0810   Vitals shown include unvalidated device data.    Electronically Signed By: ASHLEY Astorga CRNA  2022  8:11 AM

## 2022-12-27 NOTE — PLAN OF CARE
SBAR report received from Itzel HERBERT RN. This RN will assume OB cares of patient while in the Main PACU.

## 2022-12-27 NOTE — ANESTHESIA POSTPROCEDURE EVALUATION
Patient: Debbi Grossman    Procedure: Procedure(s):  REPEAT  SECTION       Anesthesia Type:  Spinal    Note:     Postop Pain Control: Uneventful   PONV: No   Neuro/Psych: Uneventful            Sign Out: Acceptable/Baseline neuro status   Airway/Respiratory: Uneventful            Sign Out: Acceptable/Baseline resp. status   CV/Hemodynamics:             Sign Out: Acceptable CV status   Other NRE: NONE   DID A NON-ROUTINE EVENT OCCUR? YES    Event details/Postop Comments:  H/O afib with prior c. delivery.  Noted to be in a. fib immediately after SAB. V rate acceptable, VS stable.  Discussed with Ms Grossman.  Cardiology consult in PACU.             Last vitals:  Vitals Value Taken Time   /82 22 1000   Temp 36.3  C (97.3  F) 22 0830   Pulse 103 22 1002   Resp 17 22 1002   SpO2 96 % 22 0955   Vitals shown include unvalidated device data.    Electronically Signed By: Marianela Dior MD  2022  1:19 PM

## 2022-12-27 NOTE — OP NOTE
Procedure Date: 2022    PREOPERATIVE DIAGNOSES:    1.  39+3 weeks' gestation.  2.  Prior  section.  3.  Active labor.    POSTOPERATIVE DIAGNOSES:    1.  39+3 weeks' gestation.  2.  Prior  section.  3.  Active labor.  4.  Maternal atrial fibrillation.    SURGEON:  Natalee Farris M.D.     ASSISTANT:   Dr. Mahsa Kerns.  Dr. Kerns's assistance was needed for retraction and visualization given the repeat nature of the  section, as well as high level of concern for adhesive disease given history of bladder injury and second surgery.    PROCEDURE:  Repeat low transverse  section.    ANESTHESIA:  Spinal.    COMPLICATIONS:  None.    ESTIMATED BLOOD LOSS:  450 mL    FINDINGS:  A female infant in the right occiput transverse position with a weight of 9 pounds 4 ounces and Apgars of 8 and 9.  Normal-appearing uterus, tubes, and bilateral ovaries.  Several filmy peritoneal adhesions noted to the uterine body and fundus.    INDICATIONS FOR PROCEDURE:  Debbi is a 37-year-old G2, P1 who presented to Labor and Delivery at 39+3 weeks' gestation in active labor.  Debbi was scheduled for repeat  section for me on 2022.  Given active contractions and advanced dilation, decision was made to perform repeat  section in an urgent fashion.  The risks, benefits and alternatives were discussed.  Consent form was signed.    DESCRIPTION OF PROCEDURE:  Debbi was taken to the operating room where spinal anesthesia was administered without difficulty.  At time of spinal, Debbi did report that she was feeling quite lightheaded and nauseous and upon completion of the spinal with supine positioning, Debbi was found to be in atrial fibrillation.  She was prepped and draped in the normal sterile fashion in dorsal supine position with left lateral tilt.  Pfannenstiel skin incision was made over preexisting scar, and this was carried down to the underlying layer of fascia using the Bovie.   Fascia was incised in the midline, and this incision was extended laterally using the Vinson scissors.  Superior aspect of the fascial incision was then grasped with the Kocher clamps x2, elevated, and the rectus muscles dissected.  Rectus muscles were then  in the midline.  The peritoneum was identified, tented and entered sharply.  Several filmy adhesions were noted between the peritoneum and anterior fundus.  Salomon retractor was placed after takedown of these filmy adhesions and lower uterine segment was incised in a transverse fashion.  Amniotomy was performed for clear fluid and infant was delivered from right occiput transverse position without difficulty.  No nuchal cords were noted and baby was placed on maternal abdomen.  Cord clamping was delayed by 60 seconds.  Infant was handed off to awaiting nurses.  The placenta was delivered manually and intact.  Uterus was exteriorized and cleared of all clot and debris.  Uterine incision was reapproximated using an 0 Vicryl in a running locked fashion.  Excellent hemostasis was obtained, and the uterus was re-placed into the pelvic cavity.  Gutters were cleared of all clot and debris.  Peritoneum was reapproximated using a 3-0 Vicryl in a running fashion.  Fascia was reapproximated using an 0 looped PDS in a running fashion.  Irrigation was performed prior to skin closure, which was completed using a 4-0 Monocryl in a subcuticular stitch.  Bandages were placed and the patient was taken to the recovery room in stable condition.  Stat Cardiology consult was placed due to Dimple still being in rate controlled atrial fibrillation, and for this reason, she was recovered in the PACU for closer monitoring and easier access should any intervention need to be taken.    Natalee Farris MD        D: 2022   T: 2022   MT: SHEY    Name:     DIMPLE COCHRAN  MRN:      [id number]        Account:        [id number]   :      1985           Procedure  Date: 12/27/2022     Document: [id number]    cc:  Natalee Farris MD

## 2022-12-27 NOTE — PLAN OF CARE
615 received report from KELIN Parker Rn. Patient prepped for  section.    638 To OR    701 Baby girl born    707 Report given to Joann ROSENTHAL Rn.

## 2022-12-27 NOTE — CONSULTS
[facility]    Cardiology Consultation     Date of Admission:  2022    Assessment & Plan   Debbi Grossman is a 37 year old female who was admitted on 2022 for a scheduled .     ASSESSMENT    1. Atrial fibrillation, paroxsymal - currently rate controlled and asymptomatic.    - History of paroxysmal atrial fibrillation during her previous pregnancy , spontaneously converted with IV diltiazem and no noted recurrent atrial fibrillation.   - Patient does feel she had 2 episodes of paroxsymal atrial fibrillation in last 4 months, lasting about 30-45mins   - [id number] score 1 (female)    Patient seen in PACU after , in atrial fibrillation with controlled ventricular rate, asymptomatic, hemodynamically stable. She does feel like she has had 2 paroxsymal episodes of atrial fibrillation in the past 4 months, lasting about 30-45mins. No further intervention needed at this time, if heart rates become elevated will consider diltiazem or metoprolol. No anticoagulation needs at this time.     RECOMMENDATIONS  1. Continue telemetry monitoring  2. Okay to transfer to the OB floor   3. ZIO monitor at discharge  4. Cardiology will continue to follow  5. Will coordinate post hospital cardiology follow up for ~ 8 weeks to review monitor results.       Moderate complexity     ASHLEY Yusuf CNP, MD    Primary Care Physician   Natalee Farris    Reason for Consult   Reason for consult: I was asked by Dr. Farris to evaluate this patient due to atrial fibrillation.    History of Present Illness   Debbi Grossman is a 37 year old female who presents with for a scheduled , she is  39w3d, she was in active labor and 6cm dilated on arrival.      Early this morning she delivered a baby girl via , she went into recurrent atrial fibrillation during spinal procedure. Heart rates during  were in the 120s, since arriving in PACU have returned to  controlled rates in the 80s. EKG shows atrial fibrillation with controlled ventricular rate.     During her previous pregnancy in 2019, she was found to have atrial fibrillation during active labor. She was seen by our cardiology team at that time. She received IV diltiazem and converted spontaneously into normal sinus rhythm. Echocardiogram was completed at that time which was essentially normal with the exception of mild right atrial enlargement. After hospital discharge she had a ZIO monitor which showed one episode of SVT (4 beats) but no recurrent atrial fibrillation.     She was seen in our clinic by Dr. Finch during this pregnancy at around 18 weeks in 2022, due to complaints of occasional dizziness and some palpitations. Echo and 48 hour holter monitor were obtained. Echo was again essentially normal, LVEF noted to be 50-55%. When compared to prior study no significant change. Holter monitor showed rare PVCs and no atrial fibrillation.    Patient seen down in PACU, telemetry reviewed showing atrial fibrillation in 80-100s, asymptomatic. Denies palpitations, chest pain or shortness of breath. She does share she thinks she has 2 episodes of atrial fibrillation which have lasted about 30-45 mins.     Past Medical History   Past Medical History:   Diagnosis Date     Acute blood loss anemia 2019     Anxiety     Therapy--primarily due to last delivery and fear around this delivery     CP (cerebral palsy) (H)      History of blood transfusion      Infection due to  novel coronavirus 2022    cough     PAF (paroxysmal atrial fibrillation) (H)      Scoliosis 05/10/2012     Seasonal allergies      Status post  delivery 2019         Past Surgical History   Past Surgical History:   Procedure Laterality Date      SECTION N/A 2019    Procedure:  SECTION;  Surgeon: Natalee Farris MD;  Location:  L+D     CYSTOSCOPY N/A 2019    Procedure: CYSTOSCOPY;  Surgeon:  Natalee Farris MD;  Location:  OR     CYSTOSCOPY, CYSTOGRAM, COMBINED N/A 8/23/2019    Procedure: Cystoscopy, cystogram, combined;  Surgeon: Pedro Luis Mireles MD;  Location:  OR     CYSTOSCOPY, RETROGRADES, INSERT STENT URETER(S), COMBINED  8/23/2019    Procedure: CYSTOSCOPY WITH BILATERAL RETROGRADES/ BILATERAL STENT PLACEMENT AND REMOVAL/ CYSTOGRAM/ BLADDER REPAIR;  Surgeon: Pedro Luis Mireles MD;  Location:  OR     HC REMOVE TONSILS/ADENOIDS,<11 Y/O       HC TOOTH EXTRACTION W/FORCEP      [place] Teeth     LAPAROTOMY EXPLORATORY N/A 8/23/2019    Procedure: EXPLORATORY LAPAROTOMY WITH REPEAT EXPLORATORY LAPAROTOMY WITH BLADDER REPAIR;  Surgeon: Natalee Farris MD;  Location:  OR     REPAIR BLADDER N/A 8/23/2019    Procedure: REPAIR, BLADDER;  Surgeon: Natalee Farris MD;  Location:  OR         Prior to Admission Medications   Prior to Admission Medications   Prescriptions Last Dose Informant Patient Reported? Taking?   Docosahexaenoic Acid (DHA PO) 12/26/2022  Yes Yes   Prenatal Multivit-Min-Fe-FA (PRENATAL VITAMINS PO) 12/26/2022  Yes Yes   Sig: Take 1 tablet by mouth   senna-docusate (SENOKOT-S/PERICOLACE) 8.6-50 MG tablet 12/26/2022  Yes Yes   Sig: Take 1 tablet by mouth daily      Facility-Administered Medications: None     Current Facility-Administered Medications   Medication Dose Route Frequency     acetaminophen  975 mg Oral Q6H     enoxaparin ANTICOAGULANT  40 mg Subcutaneous Q24H     [START ON 12/28/2022] ibuprofen  800 mg Oral Q6H     ketorolac  30 mg Intravenous Q6H     senna-docusate  1 tablet Oral BID    Or     senna-docusate  2 tablet Oral BID     sodium chloride (PF)  3 mL Intracatheter Q8H     Current Facility-Administered Medications   Medication Last Rate     dextrose 5% lactated ringers       - MEDICATION INSTRUCTIONS -       - MEDICATION INSTRUCTIONS -       oxytocin in 0.9% NaCl       Allergies   Allergies   Allergen Reactions     Seasonal Allergies         Social History    reports that she has never smoked. She has never used smokeless tobacco. She reports current alcohol use. She reports that she does not use drugs.      Family History   I have reviewed this patient's family history and updated it with pertinent information if needed.  Family History   Problem Relation Age of Onset     No Known Problems Mother      Hypertension Father      Diabetes Father      Neurologic Disorder Sister      Depression Brother         bipolar depression     Neurologic Disorder Brother         epliepsy     Psychotic Disorder Maternal Grandmother      Prostate Cancer Maternal Grandfather      Cancer - colorectal Maternal Grandfather      Diabetes Maternal Grandfather      No Known Problems Paternal Grandmother      No Known Problems Paternal Grandfather      No Known Problems Other           Review of Systems   A comprehensive review of system was performed and is negative other than that noted in the HPI or here.     Physical Exam   Vital Signs with Ranges  Temp:  [96.5  F (35.8  C)] 96.5  F (35.8  C)  Pulse:  [81-93] 93  Resp:  [10-19] 19  BP: (90-98)/(60-69) 90/67  SpO2:  [95 %-96 %] 96 %  Wt Readings from Last 4 Encounters:   22 73 kg (161 lb)   12/15/22 72.1 kg (159 lb)   22 73.5 kg (162 lb)   22 70.8 kg (156 lb)     No intake/output data recorded.      Vitals: BP 90/67   Pulse 93   Temp (!) 96.5  F (35.8  C)   Resp 19   LMP 2022   SpO2 96%     Physical Exam:   General - Alert and oriented to time place and person in no acute distress  Eyes - No scleral icterus  HEENT - Neck supple, moist mucous membranes  Cardiovascular - irregularly irregular, no murmur  Extremities - No edema  Respiratory - clear bilaterally   Skin - No pallor or cyanosis  Gastrointestinal - Slight tenderness r/t   Psych - Appropriate affect   Neurological - No gross motor neurological focal deficits        Recent Labs   Lab 22  0630   WBC 8.7   HGB 12.7   MCV  92        Recent Labs   Lab Test 11/04/22  1620 11/12/21  1125 03/29/18  0824 08/04/15  0856   CHOL 263* 231*   < > 210*    125   < > 80   * 97   < > 117   TRIG 166* 45   < > 65   CHOLHDLRATIO  --   --   --  2.6    < > = values in this interval not displayed.     Recent Labs   Lab 12/27/22  0630   WBC 8.7   HGB 12.7   HCT 36.0   MCV 92          Recent Labs   Lab 12/27/22  0630          Imaging:  Holter monitor 8/2022 - rare PVCs and no atrial fibrillation     Echo:  Sinus rhythm was noted.  Left ventricular systolic function is low normal.  The visual ejection fraction is 50-55%.  The left ventricle is normal in size.  Doppler intrrogation does not demonstrate signficant stenosis or insufficiency  involving cardiac valves     As compared with the last study 8/23/2019 the patient is now in a sinus  rhythm. There has otherwise been no signficant change    Clinically Significant Risk Factors Present on Admission

## 2022-12-27 NOTE — L&D DELIVERY NOTE
Obstetrics Brief Operative Note    Pre-operative diagnosis: 39+3wks  Labor  Prior  section   Post-operative diagnosis: Same   Procedure: Repeat low transverse  section   Surgeon: Natalee Farris MD   Assistant(s): Mahsa Kerns MD   Anesthesia: Spinal anesthesia   Estimated blood loss: 450ml   Complications: Maternal Afib   Findings: Live   Female  Cephalic presentation:  right occiput transverse  APGARS: 1 minute: 8   5 minute: 9  Weight: 9lbs 4oz  Placenta: normal  Tubes: normal  Uterus: normal  Ovaries: normal     Primary OB: Farris

## 2022-12-27 NOTE — ANESTHESIA PROCEDURE NOTES
"Intrathecal Procedure Note    Pre-Procedure   Staff -        Anesthesiologist:  Miles Quintana MD       Performed By: anesthesiologist       Location: OR       Pre-Anesthestic Checklist: patient identified, IV checked, risks and benefits discussed, informed consent, monitors and equipment checked and pre-op evaluation  Timeout:       Correct Patient: Yes        Correct Procedure: Yes        Correct Site: Yes        Correct Position: Yes   Procedure Documentation  Procedure: intrathecal       Patient Position: sitting       Skin prep: Betadine       Insertion Site: L4-5. (midline approach).       Needle Gauge: 24.        Needle Length (Inches): 3.5        Spinal Needle Type: Pencan       Introducer used       Introducer: 20 G       # of attempts: 1 and  # of redirects:     Assessment/Narrative         Paresthesias: No.       CSF fluid: clear.    Medication(s) Administered   0.75% Hyperbaric Bupivacaine (Intrathecal) - Intrathecal   1.4 mL - 12/27/2022 6:44:00 AM  Morphine PF 1 mg/mL (Intrathecal) - Intrathecal   0.15 mg - 12/27/2022 6:44:00 AM   Comments:  1% lidocaine for localization.  No complications.        FOR [facility] ([facility]) ONLY:   Pain Team Contact information: please page the Pain Team Via [employer]. Search \"Pain\". During daytime hours, please page the attending first. At night please page the resident first.    "

## 2022-12-27 NOTE — ANESTHESIA PREPROCEDURE EVALUATION
Anesthesia Pre-Procedure Evaluation    Patient: Debbi Grossman   MRN: [id number] : 1985        Procedure : Procedure(s):  REPEAT  SECTION          Past Medical History:   Diagnosis Date     Acute blood loss anemia 2019     Anxiety     Therapy--primarily due to last delivery and fear around this delivery     CP (cerebral palsy) (H)      History of blood transfusion      Infection due to  novel coronavirus 2022    cough     PAF (paroxysmal atrial fibrillation) (H)      Scoliosis 05/10/2012     Seasonal allergies      Status post  delivery 2019      Past Surgical History:   Procedure Laterality Date      SECTION N/A 2019    Procedure:  SECTION;  Surgeon: Natalee Farris MD;  Location:  L+D     CYSTOSCOPY N/A 2019    Procedure: CYSTOSCOPY;  Surgeon: Natalee Farris MD;  Location:  OR     CYSTOSCOPY, CYSTOGRAM, COMBINED N/A 2019    Procedure: Cystoscopy, cystogram, combined;  Surgeon: Pedro Luis Mireles MD;  Location:  OR     CYSTOSCOPY, RETROGRADES, INSERT STENT URETER(S), COMBINED  2019    Procedure: CYSTOSCOPY WITH BILATERAL RETROGRADES/ BILATERAL STENT PLACEMENT AND REMOVAL/ CYSTOGRAM/ BLADDER REPAIR;  Surgeon: Pedro Luis Mireles MD;  Location:  OR     HC REMOVE TONSILS/ADENOIDS,<11 Y/O       HC TOOTH EXTRACTION W/FORCEP      [place] Teeth     LAPAROTOMY EXPLORATORY N/A 2019    Procedure: EXPLORATORY LAPAROTOMY WITH REPEAT EXPLORATORY LAPAROTOMY WITH BLADDER REPAIR;  Surgeon: Natalee Farris MD;  Location:  OR     REPAIR BLADDER N/A 2019    Procedure: REPAIR, BLADDER;  Surgeon: Natalee Farris MD;  Location:  OR      Allergies   Allergen Reactions     Seasonal Allergies       Social History     Tobacco Use     Smoking status: Never     Smokeless tobacco: Never   Substance Use Topics     Alcohol use: Yes     Comment: Less than once a month      Wt Readings from Last 1 Encounters:    12/22/22 73 kg (161 lb)        Anesthesia Evaluation            ROS/MED HX  ENT/Pulmonary:    (-) sleep apnea   Neurologic:       Cardiovascular: Comment: History of afib with previous pregnancy/labor - no issue this time      METS/Exercise Tolerance:     Hematologic:       Musculoskeletal:       GI/Hepatic:     (+) GERD,     Renal/Genitourinary:       Endo:       Psychiatric/Substance Use:       Infectious Disease:       Malignancy:       Other:            Physical Exam    Airway        Mallampati: II   TM distance: > 3 FB   Neck ROM: full   Mouth opening: > 3 cm    Respiratory Devices and Support         Dental  no notable dental history         Cardiovascular   cardiovascular exam normal          Pulmonary   pulmonary exam normal                OUTSIDE LABS:  CBC:   Lab Results   Component Value Date    WBC 9.3 10/04/2022    WBC 6.9 06/08/2022    HGB 11.6 (L) 10/04/2022    HGB 12.6 06/08/2022    HCT 34.9 (L) 10/04/2022    HCT 38.4 06/08/2022     10/04/2022     06/08/2022     BMP:   Lab Results   Component Value Date     (H) 08/27/2019     (H) 08/26/2019    POTASSIUM 3.5 08/27/2019    POTASSIUM 3.5 08/26/2019    CHLORIDE 113 (H) 08/27/2019    CHLORIDE 115 (H) 08/26/2019    CO2 27 08/27/2019    CO2 24 08/26/2019    BUN 3 (L) 08/27/2019    BUN 6 (L) 08/26/2019    CR 0.55 08/27/2019    CR 0.55 08/26/2019    GLC 84 08/27/2019    GLC 75 08/26/2019     COAGS:   Lab Results   Component Value Date    PTT 32 08/24/2019    INR 1.23 (H) 08/24/2019    FIBR 249 08/24/2019     POC:   Lab Results   Component Value Date     (H) 08/24/2019     HEPATIC:   Lab Results   Component Value Date    ALBUMIN 1.9 (L) 08/26/2019    PROTTOTAL 4.3 (L) 08/26/2019    ALT 18 08/26/2019    AST 36 08/26/2019    ALKPHOS 64 08/26/2019    BILITOTAL 0.4 08/26/2019     OTHER:   Lab Results   Component Value Date    PH 7.24 (L) 08/24/2019    LACT 4.1 (HH) 08/24/2019    MITZI 7.9 (L) 08/27/2019    PHOS 2.8 08/23/2019     MAG 2.3 08/23/2019    TSH 4.52 (H) 08/23/2019    T4 1.12 08/23/2019    SED 6 07/26/2012       Anesthesia Plan    ASA Status:  2   NPO Status:  NPO Appropriate    Anesthesia Type: Spinal.              Consents    Anesthesia Plan(s) and associated risks, benefits, and realistic alternatives discussed. Questions answered and patient/representative(s) expressed understanding.    - Discussed:     - Discussed with:  Patient         Postoperative Care    Pain management: IV analgesics.   PONV prophylaxis: Ondansetron (or other 5HT-3)     Comments:                ELIANE ESCALANTE MD

## 2022-12-28 LAB — HGB BLD-MCNC: 11.1 G/DL (ref 11.7–15.7)

## 2022-12-28 PROCEDURE — 99232 SBSQ HOSP IP/OBS MODERATE 35: CPT | Performed by: NURSE PRACTITIONER

## 2022-12-28 PROCEDURE — 250N000013 HC RX MED GY IP 250 OP 250 PS 637: Performed by: OBSTETRICS & GYNECOLOGY

## 2022-12-28 PROCEDURE — 85018 HEMOGLOBIN: CPT | Performed by: OBSTETRICS & GYNECOLOGY

## 2022-12-28 PROCEDURE — 250N000011 HC RX IP 250 OP 636: Performed by: OBSTETRICS & GYNECOLOGY

## 2022-12-28 PROCEDURE — 120N000012 HC R&B POSTPARTUM

## 2022-12-28 PROCEDURE — 36415 COLL VENOUS BLD VENIPUNCTURE: CPT | Performed by: OBSTETRICS & GYNECOLOGY

## 2022-12-28 RX ADMIN — SENNOSIDES AND DOCUSATE SODIUM 1 TABLET: 50; 8.6 TABLET ORAL at 08:11

## 2022-12-28 RX ADMIN — IBUPROFEN 800 MG: 400 TABLET ORAL at 08:12

## 2022-12-28 RX ADMIN — ACETAMINOPHEN 975 MG: 325 TABLET, FILM COATED ORAL at 12:34

## 2022-12-28 RX ADMIN — IBUPROFEN 800 MG: 400 TABLET ORAL at 19:51

## 2022-12-28 RX ADMIN — KETOROLAC TROMETHAMINE 30 MG: 30 INJECTION, SOLUTION INTRAMUSCULAR; INTRAVENOUS at 02:25

## 2022-12-28 RX ADMIN — SENNOSIDES AND DOCUSATE SODIUM 2 TABLET: 50; 8.6 TABLET ORAL at 19:51

## 2022-12-28 RX ADMIN — ACETAMINOPHEN 975 MG: 325 TABLET, FILM COATED ORAL at 06:36

## 2022-12-28 RX ADMIN — ACETAMINOPHEN 975 MG: 325 TABLET, FILM COATED ORAL at 00:35

## 2022-12-28 RX ADMIN — IBUPROFEN 800 MG: 400 TABLET ORAL at 14:09

## 2022-12-28 RX ADMIN — ACETAMINOPHEN 975 MG: 325 TABLET, FILM COATED ORAL at 18:41

## 2022-12-28 ASSESSMENT — ACTIVITIES OF DAILY LIVING (ADL)
ADLS_ACUITY_SCORE: 21
ADLS_ACUITY_SCORE: 20
ADLS_ACUITY_SCORE: 21
ADLS_ACUITY_SCORE: 20
ADLS_ACUITY_SCORE: 21
ADLS_ACUITY_SCORE: 20
ADLS_ACUITY_SCORE: 21
ADLS_ACUITY_SCORE: 20
ADLS_ACUITY_SCORE: 21
ADLS_ACUITY_SCORE: 21

## 2022-12-28 NOTE — PLAN OF CARE
Vital signs stable.  Sats 97%. Denies chest pain, nausea,shortness breath, dizziness, or fluttering in chest. Postpartum assessment WDL. Incision dressing clean dry intact patient will let rn know when done with shower for dressing to be removed. Pain controlled with tylenol and ibuprofen patient states will ask if needs something more for pain. Patient ambulating well. Patient passing gas. Voiding well. Breastfeeding . Patient and infant bonding well. Will continue with current plan of care.

## 2022-12-28 NOTE — PROGRESS NOTES
[facility]    Obstetrics Post-Op / Progress Note    Assessment & Plan   Assessment:  -1 Day Post-Op  Procedure(s):  REPEAT  SECTION    Doing well.  No immediate surgical complications identified.  No excessive bleeding  Pain well-controlled.  Converted to NSR overnight    Plan:  Ambulation encouraged  Hemoglobin in AM  Lactation consultation  Monitor wound for signs of infection  Pain control measures as needed  Reportable signs and symptoms dicussed with the patient  Appreciate cardiology input/management  Anticipate discharge in 1-2d    Natalee Farris MD     Interval History   Doing well.  Pain is well-controlled.  No fevers.  No history of wound drainage, warmth or significant erythema.  Good appetite.  Denies chest pain, shortness of breath, nausea or vomiting.  Ambulatory without lightheadedness or dizziness.  Breastfeeding well.  No issues overnight --got a few hours of sleep    Medications     dextrose 5% lactated ringers       - MEDICATION INSTRUCTIONS -       - MEDICATION INSTRUCTIONS -       oxytocin in 0.9% NaCl Stopped (22 1201)       acetaminophen  975 mg Oral Q6H     ibuprofen  800 mg Oral Q6H     senna-docusate  1 tablet Oral BID    Or     senna-docusate  2 tablet Oral BID     sodium chloride (PF)  3 mL Intracatheter Q8H       Physical Exam   Temp: 98  F (36.7  C) Temp src: Oral BP: 101/60 Pulse: 73   Resp: 16 SpO2: 98 % O2 Device: None (Room air)    There were no vitals filed for this visit.  Vital Signs with Ranges  Temp:  [97.8  F (36.6  C)-98.1  F (36.7  C)] 98  F (36.7  C)  Pulse:  [] 73  Resp:  [10-18] 16  BP: ()/(60-82) 101/60  SpO2:  [97 %-100 %] 98 %  I/O last 3 completed shifts:  In: 2650 [P.O.:1550; I.V.:1100]  Out: 2705 [Urine:2100; Blood:605]    Uterine fundus is firm, non-tender and at the level of the umbilicus  Incision C/D/I  Extremities Non-tender    Data   Recent Labs   Lab Test 22  0630 22  0855 19  0352   ABO   --   --  A   RH  --   --  Pos   AS Negative   < > Neg    < > = values in this interval not displayed.     Recent Labs   Lab Test 12/27/22  0630 10/04/22  1624   HGB 12.7 11.6*     Recent Labs   Lab Test 06/08/22  0855   RUQIGG Positive

## 2022-12-28 NOTE — PLAN OF CARE
Vital signs stable, monitored on telemetry and reading normal sinus rhythm overnight per [facility] RN. Postpartum assessment WDL. Incision CDI with dressing. Pain controlled with tylenol and toradol, PRN ice pack to incision, abdominal binder on for comfort. Patient ambulating with standby assistance, free of dizziness or lightheadedness. Patient denies passing gas, BS active in all quadrants. Medina catheter removed last evening and patient is voiding without difficulty. Tolerating regular diet. LS clear and equal bilaterally. Working on breastfeeding every 2-3 hours and hand expressing drops of colostrum into infant's mouth at breast. Minimal assistance provided for positioning/latch. Patient and infant bonding well. Will continue with current plan of care.

## 2022-12-28 NOTE — PLAN OF CARE
Patient assisted to side of bed. Patient sat on edge of bed without dizziness or lightheadedness. Patient able to stand at bedside and march in place. Patient requesting Medina cath to be removed. Medina discontinued without difficulty, will monitor urge to void. Dressing remains clean, dry and intact. Fundus firm, -1 umbilicus. Vaginal flow scant to light without clots. Patient denies pain.

## 2022-12-28 NOTE — PLAN OF CARE
VSS Hemoglobin 11.1. SL IV discontinued. Incision covered with Dressing that CDI. Pt monitored on telemetry and reading normal sinus rhythm today per [facility] RN. Pt up independently in the room. Voiding in good amounts. Tolerating a regular diet. Using Ibuprofen and tylenol for pain control. Pt states that pain medication is controlling her pain.  Denies the need for narcotic pain medication. Also using ice to her incision. Has abdominal binder on. Breastfeeding infant on demand, latching well. Waiting for cardiology to see patient today. Will continue the plan of care.  1430-Cardiology saw patient and discontinued telemetry. Okay to transfer patient to post partum.

## 2022-12-28 NOTE — PROGRESS NOTES
[facility]  Cardiology Progress Note  Date of Service: 12/28/2022  Primary Cardiologist: Dr. Finch    Assessment & Plan    Debbi Grossman is a 37 year old female admitted on 12/27/2022.     Assessment:  1. Atrial fibrillation, paroxsymal - spontaneously converted to normal sinus rhythm overnight.               - History of paroxysmal atrial fibrillation during her previous pregnancy 2019, spontaneously converted with IV diltiazem and no noted recurrent atrial fibrillation.              - Patient does feel she had 2 episodes of paroxsymal atrial fibrillation in last 4 months, lasting about 30-45mins              - [id number] score 1 (female)   - Appears labor was again trigger for atrial fibrillation     Patient doing well, spontaneously converted into sinus rhythm overnight. No recurrent episodes of atrial fibrillation noted.     Plan:   1. Okay to discontinue telemetry monitoring and transfer to post-partum floor  2. 14 day zio monitor at discharge  3. Post hospital cardiology follow up in 8 weeks to review monitor results   4. Cardiology signing off, please contact with any additional questions/concerns    ASHLEY Yusuf [facility]  Pager: [phone]      I spent 25 minutes face-to-face and/or coordinating care of Debbi Grossman. Over 50% of our time on the unit was spent counseling the patient and/or coordinating care regarding his or her cardiac condition.    Interval History   No acute events, spontaneously converted to sinus rhythm overnight. Denies chest pain, shortness of breath, palpitations or lightheadedness.     Physical Exam   Temp: 98  F (36.7  C) Temp src: Oral BP: 101/60 Pulse: 73   Resp: 16 SpO2: 98 % O2 Device: None (Room air)    There were no vitals filed for this visit.  General - Alert and oriented to time place and person in no acute distress  Eyes - No scleral icterus  HEENT - Neck supple, moist mucous membranes  Cardiovascular - irregularly  irregular, no murmur  Extremities - No edema  Respiratory - clear bilaterally   Skin - No pallor or cyanosis  Gastrointestinal - Slight tenderness r/t   Psych - Appropriate affect   Neurological - No gross motor neurological focal deficits      Medications     dextrose 5% lactated ringers       - MEDICATION INSTRUCTIONS -       - MEDICATION INSTRUCTIONS -       oxytocin in 0.9% NaCl Stopped (22 1201)       acetaminophen  975 mg Oral Q6H     ibuprofen  800 mg Oral Q6H     senna-docusate  1 tablet Oral BID    Or     senna-docusate  2 tablet Oral BID     sodium chloride (PF)  3 mL Intracatheter Q8H       Data   Last 24 hours labs reviewed     Imaging:  Holter monitor 2022 - rare PVCs and no atrial fibrillation      Echo:  Sinus rhythm was noted.  Left ventricular systolic function is low normal.  The visual ejection fraction is 50-55%.  The left ventricle is normal in size.  Doppler intrrogation does not demonstrate signficant stenosis or insufficiency  involving cardiac valves     As compared with the last study 2019 the patient is now in a sinus  rhythm. There has otherwise been no signficant change

## 2022-12-29 ENCOUNTER — APPOINTMENT (OUTPATIENT)
Dept: CARDIOLOGY | Facility: CLINIC | Age: 37
End: 2022-12-29
Attending: NURSE PRACTITIONER
Payer: COMMERCIAL

## 2022-12-29 VITALS
SYSTOLIC BLOOD PRESSURE: 107 MMHG | DIASTOLIC BLOOD PRESSURE: 76 MMHG | HEART RATE: 64 BPM | OXYGEN SATURATION: 97 % | RESPIRATION RATE: 16 BRPM | TEMPERATURE: 97.6 F

## 2022-12-29 PROCEDURE — 250N000013 HC RX MED GY IP 250 OP 250 PS 637: Performed by: OBSTETRICS & GYNECOLOGY

## 2022-12-29 PROCEDURE — 93248 EXT ECG>7D<15D REV&INTERPJ: CPT | Performed by: INTERNAL MEDICINE

## 2022-12-29 PROCEDURE — 93246 EXT ECG>7D<15D RECORDING: CPT

## 2022-12-29 RX ORDER — AMOXICILLIN 250 MG
1 CAPSULE ORAL 2 TIMES DAILY
Qty: 60 TABLET | Refills: 1 | Status: SHIPPED | OUTPATIENT
Start: 2022-12-29 | End: 2023-02-17

## 2022-12-29 RX ORDER — ACETAMINOPHEN 325 MG/1
650 TABLET ORAL EVERY 6 HOURS PRN
Qty: 60 TABLET | Refills: 1 | Status: SHIPPED | OUTPATIENT
Start: 2022-12-29 | End: 2023-02-17

## 2022-12-29 RX ORDER — IBUPROFEN 600 MG/1
600 TABLET, FILM COATED ORAL EVERY 6 HOURS PRN
Qty: 60 TABLET | Refills: 1 | Status: SHIPPED | OUTPATIENT
Start: 2022-12-29 | End: 2023-02-17

## 2022-12-29 RX ORDER — OXYCODONE HYDROCHLORIDE 5 MG/1
5 TABLET ORAL EVERY 4 HOURS PRN
Qty: 20 TABLET | Refills: 0 | Status: SHIPPED | OUTPATIENT
Start: 2022-12-29 | End: 2023-02-17

## 2022-12-29 RX ADMIN — SENNOSIDES AND DOCUSATE SODIUM 2 TABLET: 50; 8.6 TABLET ORAL at 08:29

## 2022-12-29 RX ADMIN — ACETAMINOPHEN 975 MG: 325 TABLET, FILM COATED ORAL at 12:27

## 2022-12-29 RX ADMIN — IBUPROFEN 800 MG: 400 TABLET ORAL at 03:26

## 2022-12-29 RX ADMIN — ACETAMINOPHEN 975 MG: 325 TABLET, FILM COATED ORAL at 06:32

## 2022-12-29 RX ADMIN — ACETAMINOPHEN 975 MG: 325 TABLET, FILM COATED ORAL at 00:33

## 2022-12-29 RX ADMIN — IBUPROFEN 800 MG: 400 TABLET ORAL at 09:34

## 2022-12-29 ASSESSMENT — ACTIVITIES OF DAILY LIVING (ADL)
ADLS_ACUITY_SCORE: 20

## 2022-12-29 NOTE — PROGRESS NOTES
[facility]    Obstetrics Post-Op / Progress Note    Assessment & Plan   Assessment:  -2 Days Post-Op  Procedure(s):  REPEAT  SECTION    Doing well.  Clean wound without signs of infection.  No excessive bleeding  Pain well-controlled.    Plan:  Ambulation encouraged  Monitor wound for signs of infection  Pain control measures as needed  Reportable signs and symptoms dicussed with the patient  Will need zio patch per cardiology upon discharge  Possible discharge later today or tomorrow    Natalee Farris MD     Interval History   Doing well.  Pain is well-controlled with oral meds.  No fevers.  No history of wound drainage, warmth or significant erythema.  Good appetite.  Denies chest pain, shortness of breath, nausea or vomiting.  Ambulatory iwthout issues.  Breastfeeding well.  No issues overnight    Medications     dextrose 5% lactated ringers       - MEDICATION INSTRUCTIONS -       - MEDICATION INSTRUCTIONS -       oxytocin in 0.9% NaCl Stopped (22 1201)       acetaminophen  975 mg Oral Q6H     ibuprofen  800 mg Oral Q6H     senna-docusate  1 tablet Oral BID    Or     senna-docusate  2 tablet Oral BID     sodium chloride (PF)  3 mL Intracatheter Q8H       Physical Exam   Temp: 97.6  F (36.4  C) Temp src: Oral BP: 107/76 Pulse: 64   Resp: 16 SpO2: 97 %      There were no vitals filed for this visit.  Vital Signs with Ranges  Temp:  [97.6  F (36.4  C)-97.8  F (36.6  C)] 97.6  F (36.4  C)  Pulse:  [64-69] 64  Resp:  [16] 16  BP: (106-107)/(71-76) 107/76  SpO2:  [97 %] 97 %  I/O last 3 completed shifts:  In: -   Out: 400 [Urine:400]    Uterine fundus is firm, non-tender and at the level of the umbilicus  Incision C/D/I  Extremities Non-tender    Data   Recent Labs   Lab Test 22  0630 22  0855 19  0352   ABO  --   --  A   RH  --   --  Pos   AS Negative   < > Neg    < > = values in this interval not displayed.     Recent Labs   Lab Test 22  0943  12/27/22  0630   HGB 11.1* 12.7     Recent Labs   Lab Test 06/08/22  0855   RUQIGG Positive

## 2022-12-29 NOTE — PLAN OF CARE
Vital signs stable. Postpartum assessment WDL. Incision CDI with sutures and steri strips. Pain controlled with tylenol and ibuprofen, PRN ice pack to incision, abdominal binder on for comfort. Patient ambulating independently, free of dizziness or lightheadedness. Patient reports passing gas and had small BM last evening. Voiding without difficulty. LS clear and equal bilaterally. Breastfeeding on cue with minimal to no assist. Patient and infant bonding well. Will continue with current plan of care.

## 2022-12-29 NOTE — PLAN OF CARE
VSS Pt using Ibuprofen and tylenol for pain control. Denies the need for narcotic pain meds. ZIO patch placed at 1200. Breastfeeding on demand, infant latching well. Will be discharging later today with  and infant. Prescription medication being filled here at [facility].

## 2022-12-30 ENCOUNTER — MYC MEDICAL ADVICE (OUTPATIENT)
Dept: OBGYN | Facility: CLINIC | Age: 37
End: 2022-12-30

## 2022-12-30 ENCOUNTER — TELEPHONE (OUTPATIENT)
Dept: CARDIOLOGY | Facility: CLINIC | Age: 37
End: 2022-12-30

## 2022-12-30 LAB
ATRIAL RATE - MUSE: 220 BPM
DIASTOLIC BLOOD PRESSURE - MUSE: NORMAL MMHG
INTERPRETATION ECG - MUSE: NORMAL
P AXIS - MUSE: NORMAL DEGREES
PR INTERVAL - MUSE: NORMAL MS
QRS DURATION - MUSE: 94 MS
QT - MUSE: 390 MS
QTC - MUSE: 471 MS
R AXIS - MUSE: 86 DEGREES
SYSTOLIC BLOOD PRESSURE - MUSE: NORMAL MMHG
T AXIS - MUSE: -7 DEGREES
VENTRICULAR RATE- MUSE: 88 BPM

## 2022-12-30 NOTE — TELEPHONE ENCOUNTER
Patient was admitted to [facility] on 22 for a scheduled . Developed PAF during labor. Spontaneously converted to normal sinus rhythm overnight. Patient does feel she had 2 episodes of paroxsymal atrial fibrillation in last 4 months, lasting about 30-45 min's.    PMH: Hx of paroxysmal atrial fibrillation during her previous pregnancy , spontaneously converted with IV diltiazem and no noted recurrent atrial fibrillation.    No cardiac medications started at time of discharge. 14 day Zio Patch monitor placed at time of discharge.    Pt is scheduled on 23 at 0940 with Dr. Finch at our [place] Office.    Writer attempted to call pt for a cardiology post discharge phone call, but no answer and VM box is full. Will try back at a later date. HEMANT Guan RN.

## 2022-12-31 NOTE — DISCHARGE SUMMARY
Admit Date: 2022  Discharge Date: 2022    HOSPITAL COURSE:  Debbi is a 37-year-old G2, P1 who was admitted to Labor and Delivery at 39+3 weeks' gestation in active labor.  Debbi was scheduled for repeat  section on 2022 and underwent repeat low transverse  section in the early morning hours of 2022.  Debbi delivered a female infant with a weight of 9 pounds 4 ounces and Apgars of 8 and 9 at 1 and 5 minutes respectively.  Please refer to operative dictation regarding details of this procedure.  While sitting for spinal anesthesia, Debbi was found to have entered atrial fibrillation and for this reason, underwent recovery in the main OR PACU.  Cardiology consultation was placed and due to rate controlled AFib, decision was made for cardiac monitoring and observation.  For this reason, Debbi was initially monitored on Labor and Delivery for her postpartum recovery.  Pain was well controlled using a combination of IV as well as oral medications.  Medina catheter was discontinued on postoperative day #1 and she was able to void without issues.  Debbi was allowed to advance her diet with return of bowel function and was tolerating a regular diet prior to discharge home.  Debbi showed no sign or symptoms of infection throughout postoperative hospital stay and was found to be hemodynamically stable with a postoperative hemoglobin of 11.1 g/dL.  Overnight on postoperative day #1, Debbi was found to spontaneously convert back to normal sinus rhythm and she was allowed to transfer care to the postpartum floor.  Debbi was discharged to home on postoperative day #2 after meeting all requirements for discharge.  She has verbalized understanding of discharge instructions and was given a list of contact numbers should any issues or problems arise.  Debbi was sent home with a Zio patch by the cardiology team and has followup with them in 8 weeks.  Debbi will follow up with me for routine  postpartum visit in 6 weeks' time.    Natalee Farris MD        D: 2022   T: 2022   MT: [email or web address]    Name:     DIMPLE COCHRAN  MRN:      [id number]        Account:      [id number]   :      1985           Service Date: 2022                                  Discharge Date: 2022     Document: [id number]    cc:  Natalee Farris MD

## 2023-01-04 NOTE — TELEPHONE ENCOUNTER
Writer called pt and she denies any palpitations, chest pain, SOB or ligtheadedness.    Continues to wear the Zio Patch monitor.    Reviewed f/u appt with Dr. Finch as below and his Team RN phone number was provided for any further questions.    Pt verbalized understanding of all instructions without further questions. HEMANT Guan RN.

## 2023-01-14 ENCOUNTER — HEALTH MAINTENANCE LETTER (OUTPATIENT)
Age: 38
End: 2023-01-14

## 2023-02-15 PROBLEM — O34.219 PREVIOUS CESAREAN DELIVERY, ANTEPARTUM: Status: RESOLVED | Noted: 2022-06-01 | Resolved: 2023-02-15

## 2023-02-15 NOTE — PROGRESS NOTES
SUBJECTIVE:  Debbi Grossman,  is here for a postpartum visit.  She had a  Section  on 2022 delivering a healthy baby girl, named Stella weighing 9 lbs 4 oz at term.      HPI:  Here today for postpartum visit --doing great!  Baby Stella has been very good baby --good eater and sleeper.  Great milk supply (if not oversupply).  Going 4hrs between nighttime feedings.  Recovery has gone very well --much easier than her first with catheter and bladder injury as well as being new parents.  No pain issues.  Incision healing well.  Eating/drinking well.  Still struggling with some constipation --using fiber and stood softener but still occ pain.  ?hemorrhoid?  Has not resumed SA.  Jennifer has vasectomy scheduled but will use minipill if active before this is done  No bladder issues.   Moods good --denies depression or anxiety; has been nice having Jennifer home for paternity leave  Returns to work in early April  -had cardiology follow up visit this AM for atrial fibrillation which has now occurred with both labors; had a few PVCs on zio patch but nothing further.  Will follow up yearly    Last PHQ-9 score on record=   PHQ-9 SCORE 2023   PHQ-9 Total Score MyChart -   PHQ-9 Total Score 1     ANDRZEJ-7 SCORE 2021   Total Score - 2 (minimal anxiety) -   Total Score 1 2 1       Pap: (  Lab Results   Component Value Date    PAP NIL 2018    PAP NIL 2015    PAP NIL 05/10/2012    )  HPV Neg    Delivery complications:  No  Breast feeding:  Yes  Bladder problems:  No  Bowel problems/hemorrhoids:  No  Episiotomy/laceration/incision healed? Yes: 2 spots  Vaginal flow:  None  [place]:  No  Contraception: Discuss  Emotional adjustment:  doing well and happy  Back to work:   point review of systems negative other than symptoms noted below or in the HPI.    OBJECTIVE:  Vitals: /62   Wt 62.6 kg (138 lb)   LMP 2022   Breastfeeding Yes   BMI 20.09  kg/m    BMI= Body mass index is 20.09 kg/m .  General - pleasant female in no acute distress.  Breast -  deferred  Abdomen - Incision well-healed  Pelvic - EG: normal adult female, BUS: within normal limits, Vagina: well rugated, no discharge, Cervix: no lesions or CMT, Uterus: firm, normal sized and nontender, midplane in position. Adnexae: no masses or tenderness.  Rectovaginal - deferred.    ASSESSMENT:    ICD-10-CM    1. Routine postpartum follow-up  Z39.2       2. Cervical cancer screening  Z12.4 Pap screen with HPV - recommended age 30 - 65 years      3. OCP (oral contraceptive pills) initiation  Z30.011 norethindrone (MICRONOR) 0.35 MG tablet      4. Status post  delivery  Z98.891           PLAN:  May resume normal activities without restrictions.  Pap smear was done today.    Full counseling was provided, and all questions were answered.   Return to clinic in one year for an annual visit.     Patient Instructions   Follow up with your primary care provider for your other medical problems.  Continue self breast exam.  Increase physical activity and exercise.  Lab and pap smear results will be called to the patient.  Co-testing done today and will repeat in 5yrs if normal.  Usual safety and preventative measures counseling done.  May resume all normal activities   Will use minipill for contraception until Braeden has vasectomy and clearance test done this spring/summer.    Natalee Farris MD

## 2023-02-16 NOTE — PROGRESS NOTES
HPI and Plan:           CURRENT MEDICATIONS:  Current Outpatient Medications   Medication Sig Dispense Refill     acetaminophen (TYLENOL) 325 MG tablet Take 2 tablets (650 mg) by mouth every 6 hours as needed for mild pain 60 tablet 1     Docosahexaenoic Acid (DHA PO)        ibuprofen (ADVIL/MOTRIN) 600 MG tablet Take 1 tablet (600 mg) by mouth every 6 hours as needed for moderate pain (4-6) 60 tablet 1     oxyCODONE (ROXICODONE) 5 MG tablet Take 1 tablet (5 mg) by mouth every 4 hours as needed for moderate to severe pain 20 tablet 0     Prenatal Multivit-Min-Fe-FA (PRENATAL VITAMINS PO) Take 1 tablet by mouth       senna-docusate (SENOKOT-S/PERICOLACE) 8.6-50 MG tablet Take 1 tablet by mouth 2 times daily 60 tablet 1     senna-docusate (SENOKOT-S/PERICOLACE) 8.6-50 MG tablet Take 1 tablet by mouth daily         ALLERGIES     Allergies   Allergen Reactions     Seasonal Allergies        PAST MEDICAL HISTORY:  Past Medical History:   Diagnosis Date     Acute blood loss anemia 2019     Anxiety     Therapy--primarily due to last delivery and fear around this delivery     CP (cerebral palsy) (H)      History of blood transfusion      Infection due to  novel coronavirus 2022    cough     PAF (paroxysmal atrial fibrillation) (H)      Scoliosis 05/10/2012     Seasonal allergies      Status post  delivery 2019       PAST SURGICAL HISTORY:  Past Surgical History:   Procedure Laterality Date      SECTION N/A 2019    Procedure:  SECTION;  Surgeon: Natalee Farris MD;  Location:  [facility]      SECTION N/A 2022    Procedure: REPEAT  SECTION;  Surgeon: Natalee Farris MD;  Location:  [facility]     CYSTOSCOPY N/A 2019    Procedure: CYSTOSCOPY;  Surgeon: Natalee Farris MD;  Location:  OR     CYSTOSCOPY, CYSTOGRAM, COMBINED N/A 2019    Procedure: Cystoscopy, cystogram, combined;  Surgeon: Pedro Luis Mireles MD;  Location:  OR      CYSTOSCOPY, RETROGRADES, INSERT STENT URETER(S), COMBINED  8/23/2019    Procedure: CYSTOSCOPY WITH BILATERAL RETROGRADES/ BILATERAL STENT PLACEMENT AND REMOVAL/ CYSTOGRAM/ BLADDER REPAIR;  Surgeon: Pedro Luis Mireles MD;  Location: [facility] OR     HC REMOVE TONSILS/ADENOIDS,<13 Y/O       HC TOOTH EXTRACTION W/FORCEP      [place] Teeth     LAPAROTOMY EXPLORATORY N/A 8/23/2019    Procedure: EXPLORATORY LAPAROTOMY WITH REPEAT EXPLORATORY LAPAROTOMY WITH BLADDER REPAIR;  Surgeon: Natalee Farris MD;  Location: [facility] OR     REPAIR BLADDER N/A 8/23/2019    Procedure: REPAIR, BLADDER;  Surgeon: Natalee Farris MD;  Location: [facility] OR       FAMILY HISTORY:  Family History   Problem Relation Age of Onset     No Known Problems Mother      Hypertension Father      Diabetes Father      Neurologic Disorder Sister      Depression Brother         bipolar depression     Neurologic Disorder Brother         epliepsy     Psychotic Disorder Maternal Grandmother      Prostate Cancer Maternal Grandfather      Cancer - colorectal Maternal Grandfather      Diabetes Maternal Grandfather      No Known Problems Paternal Grandmother      No Known Problems Paternal Grandfather      No Known Problems Other        SOCIAL HISTORY:  Social History     Socioeconomic History     Marital status:      Spouse name: Braeden     Number of children: 1   Occupational History     Occupation: Supply Chain     Employer: [employer]   Tobacco Use     Smoking status: Never     Smokeless tobacco: Never   Vaping Use     Vaping Use: Never used   Substance and Sexual Activity     Alcohol use: Yes     Comment: Less than once a month     Drug use: No     Sexual activity: Yes     Partners: Male     Birth control/protection: None       Review of Systems:  Skin:          Eyes:         ENT:         Respiratory:          Cardiovascular:         Gastroenterology:        Genitourinary:         Musculoskeletal:         Neurologic:         Psychiatric:         Heme/Lymph/Imm:          Endocrine:           Physical Exam:  Vitals: LMP 03/26/2022     Constitutional:  cooperative, alert and oriented, well developed, well nourished, in no acute distress        Skin:  warm and dry to the touch, no apparent skin lesions or masses noted          Head:  normocephalic, no masses or lesions        Eyes:  pupils equal and round        Lymph:      ENT:  no pallor or cyanosis, dentition good        Neck:  JVP normal        Respiratory:  clear to auscultation         Cardiac: regular rhythm                pulses full and equal                                        GI:  abdomen soft        Extremities and Muscular Skeletal:  no edema              Neurological:  affect appropriate        Psych:  Alert and Oriented x 3        CC  Ishmael Finch MD  [address]  [place] [address]

## 2023-02-17 ENCOUNTER — OFFICE VISIT (OUTPATIENT)
Dept: CARDIOLOGY | Facility: CLINIC | Age: 38
End: 2023-02-17
Payer: COMMERCIAL

## 2023-02-17 ENCOUNTER — PRENATAL OFFICE VISIT (OUTPATIENT)
Dept: OBGYN | Facility: CLINIC | Age: 38
End: 2023-02-17
Payer: COMMERCIAL

## 2023-02-17 VITALS — SYSTOLIC BLOOD PRESSURE: 100 MMHG | BODY MASS INDEX: 20.09 KG/M2 | DIASTOLIC BLOOD PRESSURE: 62 MMHG | WEIGHT: 138 LBS

## 2023-02-17 VITALS
HEART RATE: 89 BPM | HEIGHT: 70 IN | SYSTOLIC BLOOD PRESSURE: 108 MMHG | BODY MASS INDEX: 19.57 KG/M2 | DIASTOLIC BLOOD PRESSURE: 73 MMHG | OXYGEN SATURATION: 96 % | WEIGHT: 136.7 LBS

## 2023-02-17 DIAGNOSIS — I48.0 PAF (PAROXYSMAL ATRIAL FIBRILLATION) (H): ICD-10-CM

## 2023-02-17 DIAGNOSIS — Z12.4 CERVICAL CANCER SCREENING: ICD-10-CM

## 2023-02-17 DIAGNOSIS — Z30.011 OCP (ORAL CONTRACEPTIVE PILLS) INITIATION: ICD-10-CM

## 2023-02-17 DIAGNOSIS — Z98.891 STATUS POST CESAREAN DELIVERY: ICD-10-CM

## 2023-02-17 PROCEDURE — G0145 SCR C/V CYTO,THINLAYER,RESCR: HCPCS | Performed by: OBSTETRICS & GYNECOLOGY

## 2023-02-17 PROCEDURE — 87624 HPV HI-RISK TYP POOLED RSLT: CPT | Performed by: OBSTETRICS & GYNECOLOGY

## 2023-02-17 PROCEDURE — 99214 OFFICE O/P EST MOD 30 MIN: CPT | Performed by: INTERNAL MEDICINE

## 2023-02-17 PROCEDURE — 99207 PR POST PARTUM EXAM: CPT | Performed by: OBSTETRICS & GYNECOLOGY

## 2023-02-17 RX ORDER — ACETAMINOPHEN AND CODEINE PHOSPHATE 120; 12 MG/5ML; MG/5ML
0.35 SOLUTION ORAL DAILY
Qty: 90 TABLET | Refills: 3 | Status: SHIPPED | OUTPATIENT
Start: 2023-02-17

## 2023-02-17 ASSESSMENT — ANXIETY QUESTIONNAIRES
IF YOU CHECKED OFF ANY PROBLEMS ON THIS QUESTIONNAIRE, HOW DIFFICULT HAVE THESE PROBLEMS MADE IT FOR YOU TO DO YOUR WORK, TAKE CARE OF THINGS AT HOME, OR GET ALONG WITH OTHER PEOPLE: NOT DIFFICULT AT ALL
5. BEING SO RESTLESS THAT IT IS HARD TO SIT STILL: NOT AT ALL
GAD7 TOTAL SCORE: 1
1. FEELING NERVOUS, ANXIOUS, OR ON EDGE: SEVERAL DAYS
3. WORRYING TOO MUCH ABOUT DIFFERENT THINGS: NOT AT ALL
GAD7 TOTAL SCORE: 1
2. NOT BEING ABLE TO STOP OR CONTROL WORRYING: NOT AT ALL
6. BECOMING EASILY ANNOYED OR IRRITABLE: NOT AT ALL
7. FEELING AFRAID AS IF SOMETHING AWFUL MIGHT HAPPEN: NOT AT ALL

## 2023-02-17 ASSESSMENT — PATIENT HEALTH QUESTIONNAIRE - PHQ9
SUM OF ALL RESPONSES TO PHQ QUESTIONS 1-9: 1
5. POOR APPETITE OR OVEREATING: NOT AT ALL

## 2023-02-17 NOTE — PROGRESS NOTES
Service Date: 2023    HISTORY OF PRESENT ILLNESS:  I had the pleasure seeing Ms. Grossman followup at the [facility] today.  She is a very pleasant 37-year-old female who I last saw in 2022.    Ms. Grossman was originally seen in 2019 after a hospitalization for paroxysmal atrial fibrillation in the context of active labor.  At that time, she converted spontaneously to normal sinus rhythm after receiving IV diltiazem.  An echocardiogram was essentially normal, and a Zio Patch demonstrated no evidence of recurrent atrial fibrillation.    I saw her approximately 18 weeks into her second pregnancy in 2022, at which point she was doing well overall from a cardiovascular standpoint without evidence of recurrent atrial fibrillation.  She was subsequently found to have recurrent atrial fibrillation at the time of labor and ultimately underwent a .  She converted spontaneously to normal sinus rhythm during her hospitalization and has fortunately done well since then.  Today she feels well overall from a cardiovascular standpoint.  She is recovering from the  and has started some light activities, ultimately hoping to complete at least a half marathon at some point.  She denies any exertional chest discomfort, syncope or presyncope.  She has had very occasional episodes of palpitations since I saw her last, at which point she noted her heart rate increasing abruptly from the 60s to the 140s for a few seconds.  She denies any syncope or presyncope.    PHYSICAL EXAMINATION:  Dictated below.    I personally reviewed the EKG obtained during her hospitalization on 2022.  This demonstrates atrial fibrillation with controlled ventricular response.    I also reviewed the results of her recent Zio Patch monitor, which was completed on 2022.  This demonstrated sinus rhythm with 4 runs of SVT, the longest being 5 beats.    IMPRESSION:    1.  Paroxysmal atrial fibrillation.   The patient has had 2 documented episodes, both of which occurred during labor.    Ms. Cochran is doing well overall from a cardiovascular standpoint.  Fortunately, there is no evidence of recurrent atrial fibrillation.  I went over the results of her recent patch monitor and at this point do not feel that any medical therapy is indicated given the brief duration and infrequent nature of her SVT episodes.    I will plan on seeing her in followup in approximately 1 year or sooner if her clinical condition dictates otherwise.  As we discussed, both medical therapy and ablation would be options if she has recurrent symptomatic SVT.    Ishmael Finch MD        D: 2023   T: 2023   MT: kaci    Name:     DIMPLE COCHRAN  MRN:      [id number]        Account:      [id number]   :      1985           Service Date: 2023       Document: [id number]

## 2023-02-17 NOTE — PATIENT INSTRUCTIONS
Follow up with your primary care provider for your other medical problems.  Continue self breast exam.  Increase physical activity and exercise.  Lab and pap smear results will be called to the patient.  Co-testing done today and will repeat in 5yrs if normal.  Usual safety and preventative measures counseling done.  May resume all normal activities   Will use minipill for contraception until Braeden has vasectomy and clearance test done this spring/summer.

## 2023-02-17 NOTE — LETTER
2023    Natalee Farris MD  [address]  [facility] [address]    RE: Debbi Grossman       Dear Colleague,     I had the pleasure of seeing Debbi Grossman in the [facility].  HPI and Plan:           CURRENT MEDICATIONS:  Current Outpatient Medications   Medication Sig Dispense Refill     acetaminophen (TYLENOL) 325 MG tablet Take 2 tablets (650 mg) by mouth every 6 hours as needed for mild pain 60 tablet 1     Docosahexaenoic Acid (DHA PO)        ibuprofen (ADVIL/MOTRIN) 600 MG tablet Take 1 tablet (600 mg) by mouth every 6 hours as needed for moderate pain (4-6) 60 tablet 1     oxyCODONE (ROXICODONE) 5 MG tablet Take 1 tablet (5 mg) by mouth every 4 hours as needed for moderate to severe pain 20 tablet 0     Prenatal Multivit-Min-Fe-FA (PRENATAL VITAMINS PO) Take 1 tablet by mouth       senna-docusate (SENOKOT-S/PERICOLACE) 8.6-50 MG tablet Take 1 tablet by mouth 2 times daily 60 tablet 1     senna-docusate (SENOKOT-S/PERICOLACE) 8.6-50 MG tablet Take 1 tablet by mouth daily         ALLERGIES     Allergies   Allergen Reactions     Seasonal Allergies        PAST MEDICAL HISTORY:  Past Medical History:   Diagnosis Date     Acute blood loss anemia 2019     Anxiety     Therapy--primarily due to last delivery and fear around this delivery     CP (cerebral palsy) (H)      History of blood transfusion      Infection due to  novel coronavirus 2022    cough     PAF (paroxysmal atrial fibrillation) (H)      Scoliosis 05/10/2012     Seasonal allergies      Status post  delivery 2019       PAST SURGICAL HISTORY:  Past Surgical History:   Procedure Laterality Date      SECTION N/A 2019    Procedure:  SECTION;  Surgeon: Natalee Farris MD;  Location:  L+D      SECTION N/A 2022    Procedure: REPEAT  SECTION;  Surgeon: Natalee Farris MD;  Location:  L+D     CYSTOSCOPY N/A 2019    Procedure: CYSTOSCOPY;   Surgeon: Natalee Farris MD;  Location:  OR     CYSTOSCOPY, CYSTOGRAM, COMBINED N/A 8/23/2019    Procedure: Cystoscopy, cystogram, combined;  Surgeon: Pedro Luis Mireles MD;  Location:  OR     CYSTOSCOPY, RETROGRADES, INSERT STENT URETER(S), COMBINED  8/23/2019    Procedure: CYSTOSCOPY WITH BILATERAL RETROGRADES/ BILATERAL STENT PLACEMENT AND REMOVAL/ CYSTOGRAM/ BLADDER REPAIR;  Surgeon: Pedro Luis Mireles MD;  Location: [facility] OR     HC REMOVE TONSILS/ADENOIDS,<13 Y/O       HC TOOTH EXTRACTION W/FORCEP      [place] Teeth     LAPAROTOMY EXPLORATORY N/A 8/23/2019    Procedure: EXPLORATORY LAPAROTOMY WITH REPEAT EXPLORATORY LAPAROTOMY WITH BLADDER REPAIR;  Surgeon: Natalee Farris MD;  Location:  OR     REPAIR BLADDER N/A 8/23/2019    Procedure: REPAIR, BLADDER;  Surgeon: Natalee Farris MD;  Location:  OR       FAMILY HISTORY:  Family History   Problem Relation Age of Onset     No Known Problems Mother      Hypertension Father      Diabetes Father      Neurologic Disorder Sister      Depression Brother         bipolar depression     Neurologic Disorder Brother         epliepsy     Psychotic Disorder Maternal Grandmother      Prostate Cancer Maternal Grandfather      Cancer - colorectal Maternal Grandfather      Diabetes Maternal Grandfather      No Known Problems Paternal Grandmother      No Known Problems Paternal Grandfather      No Known Problems Other        SOCIAL HISTORY:  Social History     Socioeconomic History     Marital status:      Spouse name: Braeden     Number of children: 1   Occupational History     Occupation: Supply Chain     Employer: [employer]   Tobacco Use     Smoking status: Never     Smokeless tobacco: Never   Vaping Use     Vaping Use: Never used   Substance and Sexual Activity     Alcohol use: Yes     Comment: Less than once a month     Drug use: No     Sexual activity: Yes     Partners: Male     Birth control/protection: None       Review of Systems:  Skin:           Eyes:         ENT:         Respiratory:          Cardiovascular:         Gastroenterology:        Genitourinary:         Musculoskeletal:         Neurologic:         Psychiatric:         Heme/Lymph/Imm:         Endocrine:           Physical Exam:  Vitals: LMP 2022     Constitutional:  cooperative, alert and oriented, well developed, well nourished, in no acute distress        Skin:  warm and dry to the touch, no apparent skin lesions or masses noted          Head:  normocephalic, no masses or lesions        Eyes:  pupils equal and round        Lymph:      ENT:  no pallor or cyanosis, dentition good        Neck:  JVP normal        Respiratory:  clear to auscultation         Cardiac: regular rhythm                pulses full and equal                                        GI:  abdomen soft        Extremities and Muscular Skeletal:  no edema              Neurological:  affect appropriate        Psych:  Alert and Oriented x 3        CC  Ishmael Finch MD  [address]  [place] [address]                Service Date: 2023    HISTORY OF PRESENT ILLNESS:  I had the pleasure seeing Ms. Grossman followup at the [facility] today.  She is a very pleasant 37-year-old female who I last saw in 2022.    Ms. Grossman was originally seen in 2019 after a hospitalization for paroxysmal atrial fibrillation in the context of active labor.  At that time, she converted spontaneously to normal sinus rhythm after receiving IV diltiazem.  An echocardiogram was essentially normal, and a Zio Patch demonstrated no evidence of recurrent atrial fibrillation.    I saw her approximately 18 weeks into her second pregnancy in 2022, at which point she was doing well overall from a cardiovascular standpoint without evidence of recurrent atrial fibrillation.  She was subsequently found to have recurrent atrial fibrillation at the time of labor and ultimately underwent a .  She converted  spontaneously to normal sinus rhythm during her hospitalization and has fortunately done well since then.  Today she feels well overall from a cardiovascular standpoint.  She is recovering from the  and has started some light activities, ultimately hoping to complete at least a half marathon at some point.  She denies any exertional chest discomfort, syncope or presyncope.  She has had very occasional episodes of palpitations since I saw her last, at which point she noted her heart rate increasing abruptly from the 60s to the 140s for a few seconds.  She denies any syncope or presyncope.    PHYSICAL EXAMINATION:  Dictated below.    I personally reviewed the EKG obtained during her hospitalization on 2022.  This demonstrates atrial fibrillation with controlled ventricular response.    I also reviewed the results of her recent Zio Patch monitor, which was completed on 2022.  This demonstrated sinus rhythm with 4 runs of SVT, the longest being 5 beats.    IMPRESSION:    1.  Paroxysmal atrial fibrillation.  The patient has had 2 documented episodes, both of which occurred during labor.    Ms. Cochran is doing well overall from a cardiovascular standpoint.  Fortunately, there is no evidence of recurrent atrial fibrillation.  I went over the results of her recent patch monitor and at this point do not feel that any medical therapy is indicated given the brief duration and infrequent nature of her SVT episodes.    I will plan on seeing her in followup in approximately 1 year or sooner if her clinical condition dictates otherwise.  As we discussed, both medical therapy and ablation would be options if she has recurrent symptomatic SVT.    Ishmael Finch MD        D: 2023   T: 2023   MT: kaci    Name:     DIMPLE COCHRAN  MRN:      [id number]        Account:      [id number]   :      1985           Service Date: 2023       Document: [id number]      Thank you for allowing  me to participate in the care of your patient.      Sincerely,     Ishmael Finch MD     [facility]  cc:   Ishmael Finch MD  [address] [place] [address]  [place] [id number]

## 2023-02-23 LAB
BKR LAB AP GYN ADEQUACY: NORMAL
BKR LAB AP GYN INTERPRETATION: NORMAL
BKR LAB AP HPV REFLEX: NORMAL
BKR LAB AP PREVIOUS ABNORMAL: NORMAL
PATH REPORT.COMMENTS IMP SPEC: NORMAL
PATH REPORT.COMMENTS IMP SPEC: NORMAL
PATH REPORT.RELEVANT HX SPEC: NORMAL

## 2023-02-27 LAB
HUMAN PAPILLOMA VIRUS 16 DNA: NEGATIVE
HUMAN PAPILLOMA VIRUS 18 DNA: NEGATIVE
HUMAN PAPILLOMA VIRUS FINAL DIAGNOSIS: NORMAL
HUMAN PAPILLOMA VIRUS OTHER HR: NEGATIVE

## 2023-07-18 ENCOUNTER — TRANSFERRED RECORDS (OUTPATIENT)
Dept: HEALTH INFORMATION MANAGEMENT | Facility: CLINIC | Age: 38
End: 2023-07-18
Payer: COMMERCIAL

## 2023-08-11 ENCOUNTER — TRANSFERRED RECORDS (OUTPATIENT)
Dept: HEALTH INFORMATION MANAGEMENT | Facility: CLINIC | Age: 38
End: 2023-08-11
Payer: COMMERCIAL

## 2023-09-07 ENCOUNTER — NURSE TRIAGE (OUTPATIENT)
Dept: CARDIOLOGY | Facility: CLINIC | Age: 38
End: 2023-09-07

## 2023-09-07 ENCOUNTER — MYC MEDICAL ADVICE (OUTPATIENT)
Dept: CARDIOLOGY | Facility: CLINIC | Age: 38
End: 2023-09-07
Payer: COMMERCIAL

## 2023-09-07 ENCOUNTER — TRANSFERRED RECORDS (OUTPATIENT)
Dept: HEALTH INFORMATION MANAGEMENT | Facility: CLINIC | Age: 38
End: 2023-09-07
Payer: COMMERCIAL

## 2023-09-07 ENCOUNTER — TELEPHONE (OUTPATIENT)
Dept: CARDIOLOGY | Facility: CLINIC | Age: 38
End: 2023-09-07
Payer: COMMERCIAL

## 2023-09-07 DIAGNOSIS — I48.0 PAF (PAROXYSMAL ATRIAL FIBRILLATION) (H): Primary | ICD-10-CM

## 2023-09-07 NOTE — TELEPHONE ENCOUNTER
"Received a call from the call center to discuss A fib.  Spoke to Debbi, who says she was having a hemorrhoid banding procedure today, without sedation, and had an episode of A fib. She says her heart rate prior to the procedure was 78 bpm. She says she suddenly felt \"queasy\" and dizzy during the procedure, so they did an EKG, which showed A fib. She does not know what the heart rate was, and cannot see a heart rate on the EKG copy, it just shows a \"dash\" sign. She was instructed to go to ED or call cardiology. She is currently in the car with her dad. She says her Fitbit is showing she is in normal sinus rhythm with HR 80 bpm. She says she feels fine now, without feeling queasy or dizzy.  She is not currently prescribed any cardiac medications.  Advised a message will be sent to Dr. Finch's team for follow up.    1. DESCRIPTION: \"Please describe your heart rate or heartbeat that you are having\" (e.g., fast/slow, regular/irregular, skipped or extra beats, \"palpitations\") irregular  2. ONSET: \"When did it start?\" (Minutes, hours or days) today during procedure  3. DURATION: \"How long does it last\" (e.g., seconds, minutes, hours)  4. PATTERN \"Does it come and go, or has it been constant since it started?\" \"Does it get worse with exertion?\" \"Are you feeling it now?\" Not having it now  5. TAP: \"Using your hand, can you tap out what you are feeling on a chair or table in front of you, so that I can hear?\" (Note: not all patients can do this)  6. HEART RATE: \"Can you tell me your heart rate?\" \"How many beats in 15 seconds?\" (Note: not all patients can do this) currently 80 bpm  7. RECURRENT SYMPTOM: \"Have you ever had this before?\" If Yes, ask: \"When was the last time?\" and \"What happened that time?\"  8. CAUSE: \"What do you think is causing the palpitations?\"  9. CARDIAC HISTORY: \"Do you have any history of heart disease?\" (e.g., heart attack, angina, bypass surgery, angioplasty, arrhythmia) PAF, A fib RVR  10. OTHER " "SYMPTOMS: \"Do you have any other symptoms?\" (e.g., dizziness, chest pain, sweating, difficulty breathing) dizzy and queasy during episode  11. PREGNANCY: \"Is there any chance you are pregnant?\" \"When was your last menstrual period?\"  Additional Information   Negative: Difficulty breathing   Negative: Dizziness, lightheadedness, or weakness   Negative: Heart beating very rapidly (e.g., > 140 / minute) and present now  (Exception: During exercise.)    Protocols used: Heart Rate and Heartbeat [id number]    "

## 2023-09-07 NOTE — TELEPHONE ENCOUNTER
Caller reporting the following red-flag symptom(s): Afib, feeling super dizzy and queasy    Per the system red-flag symptom policy, patient was instructed to:  speak with a Registered Nurse    Action:  Patient warm transferred to a Registered Nurse

## 2023-09-07 NOTE — TELEPHONE ENCOUNTER
"Triage note:  Received a call from the call center to discuss A fib.  Spoke to Debbi, who says she was having a hemorrhoid banding procedure today, without sedation, and had an episode of A fib. She says her heart rate prior to the procedure was 78 bpm. She says she suddenly felt \"queasy\" and dizzy during the procedure, so they did an EKG, which showed A fib. She does not know what the heart rate was, and cannot see a heart rate on the EKG copy, it just shows a \"dash\" sign. She was instructed to go to ED or call cardiology. She is currently in the car with her dad. She says her Fitbit is showing she is in normal sinus rhythm with HR 80 bpm. She says she feels fine now, without feeling queasy or dizzy.  She is not currently prescribed any cardiac medications.  Advised a message will be sent to Dr. Finch's team for follow up.       Called patient based on message from [facility] and then received the triage note. Patient had just gotten home from her sigmoidoscopy at [facility]. She states she felt dizzy and queasy while in their recovery area and her fitbit showed \"inconclusive\".  [facility] gave a paper strip to take home but it doesn't have a heart rate on it.    Patient states she is feeling better now that she is home. Her fitbit shows NSR. Reviewed with patient to drink and eat and rest at home. She states she does feel cold and \"not herself\" yet. Her father is with her now and drove her to [facility] today.  Reviewed with patient that when she is feeling better to try to download her fitbit and send us any strips that show afib or any abnormal rhythms.   Reviewed with patient that if her fitbit shows irregular rates or abnormal rhythms or if she feels unwell with palpitations or SOB or chest discomfort to seek ED assessment.    Patient states she saw Dr. Finch when she had afib with pregnancies in the past and thinks stress brings out her arrhythmias. She will call if any further issues today.    Will message Dr. Finch to review  "

## 2023-09-09 NOTE — TELEPHONE ENCOUNTER
OK. Let's schedule an annual visit in the spring. We can repeat a monitor if she has further symptoms. Thanks.

## 2023-09-11 NOTE — TELEPHONE ENCOUNTER
Mychart reply sent to patient with Dr Finch's recommendations (pt had also sent a mychart about event).

## 2023-10-03 ENCOUNTER — TRANSFERRED RECORDS (OUTPATIENT)
Dept: HEALTH INFORMATION MANAGEMENT | Facility: CLINIC | Age: 38
End: 2023-10-03
Payer: COMMERCIAL

## 2023-10-27 ENCOUNTER — TELEPHONE (OUTPATIENT)
Dept: OBGYN | Facility: CLINIC | Age: 38
End: 2023-10-27

## 2023-10-27 DIAGNOSIS — Z13.1 SCREENING FOR DIABETES MELLITUS: ICD-10-CM

## 2023-10-27 DIAGNOSIS — Z13.220 SCREENING FOR CHOLESTEROL LEVEL: Primary | ICD-10-CM

## 2023-10-27 NOTE — TELEPHONE ENCOUNTER
Lab orders in place - can schedule and leave form.    Ensure she has her portion completed and signed. She can use height, weight, and BP from her last visit earlier this year or schedule a nurse only visit for updates.    Juanita Remy RN on 10/27/2023 at 4:16 PM

## 2023-10-27 NOTE — TELEPHONE ENCOUNTER
Absolutely.  Can order lipids and fasting blood sugar.  If needs vitals, can probably just schedule RN visit for height/weight and vitals.  Can leave biometric screening form with lab and I'll sign it off once her labs are back

## 2023-10-27 NOTE — TELEPHONE ENCOUNTER
Comments:  I would like an appointment to complete my biometric screen for insurance purposes. I can send form ahead of time if needed. Need form completed in 60 days.    Routing to Dr Farris: Willing to do a lab only visit as she is not due for her annual until February 2024 for her biometric form?    Juanita Remy RN on 10/27/2023 at 4:09 PM

## 2023-11-10 ENCOUNTER — TELEPHONE (OUTPATIENT)
Dept: CARDIOLOGY | Facility: CLINIC | Age: 38
End: 2023-11-10
Payer: COMMERCIAL

## 2023-11-11 ENCOUNTER — DOCUMENTATION ONLY (OUTPATIENT)
Dept: CARDIOLOGY | Facility: CLINIC | Age: 38
End: 2023-11-11
Payer: COMMERCIAL

## 2023-11-11 ENCOUNTER — MYC MEDICAL ADVICE (OUTPATIENT)
Dept: CARDIOLOGY | Facility: CLINIC | Age: 38
End: 2023-11-11
Payer: COMMERCIAL

## 2023-11-11 NOTE — PROGRESS NOTES
Cardiology On Call Note    I was contacted by the patient via telephone today as the on-call cardiologist.  is a 37 year old female who calls regarding an elevated heart rate over the last 24 hrs. She feels a little  more tired but no other cardiopulmonary symptoms. On her watch (see below) HR is between 119-130. She has a mild cold though no other symptoms. Not on AV christa blocking medications. Follows with Dr Finch, remote h/o A fib during labor and repeat Zio patch in 12/2022 showed no recurrence     Recommendations:  -observe symptoms for now and can try vagal manuevers  -if worsening or intolerable would get seen in ER with EKG and telemetry  -Instructed to reach out Dr. Finch on Monday    Reed Li MD  Cardiology  [facility]  11/11/2023

## 2023-11-11 NOTE — CONFIDENTIAL NOTE
Received page from after-hours answering service that patient has been having high heart rates.  Attempted to call patient back x2, no answer.  If patient does call back to answering service, please page out request again, and also ask if okay to leave voicemail.    Jad Larios MD  11/10/2023

## 2023-11-13 NOTE — TELEPHONE ENCOUNTER
"Attempted to update patient. Her voice mail was full and she did not answer. Dr. Finch's reply sent on my chart.    Raghu, Ms. Grossman,    Your voice mail was full today.    As we discussed, Dr. Finch would like to you monitor any further episodes of the rapid rate. He said, \"we can consider repeat monitoring and/or beta-blocker therapy if her symptoms recur. \"    Thank you  Team 2 R.N.s  [phone]  "

## 2023-11-13 NOTE — TELEPHONE ENCOUNTER
"My chart message from patient:  Debbi Grossman [place] Do [facility] Team 2  Phone Number: [phone]     Hi Dr. Finch,   I had a heart episode yesterday. I was sitting not doing activity and my heart climbed up from 60 to 118-130. It stayed elevated throughout the night. It just switched down to a lower heart rate 730 this morning. My other symptoms included a couple hours of being so cold I was shivering to the switching to feel feverish. I am also battling a chest cold right now with an occasional cough. I'm trying to over hydrate.    I was scared and wanted you to know. I called your on call but I fell asleep and missed the call.    Thanks, Debbi      [id number] spoke with patient. She states her fitbit did not show afib, just a rapid rate and \"inconclusive\". She was awakened 3x with severe night sweats and is down 4# over the last 2 days. She feels better today and is in a normal rhythm. The rapid rate was intermittent during the night, not one long episode. She had no SOB or chest discomfort, just felt extremely tired.    Patient states she has been battling a cough and cold for a month. She has used no OTC med due to breast-feeding. She is about ready to try some flonase as she is struggling to breath with the cold.     Patient will continue to document episodes. This is the first since she saw Dr. Finch.     Will message Dr. Finch to review  "

## 2023-11-13 NOTE — TELEPHONE ENCOUNTER
Okay, we can consider repeat monitoring and/or beta-blocker therapy if her symptoms recur.  Thanks.

## 2023-11-16 ENCOUNTER — MYC MEDICAL ADVICE (OUTPATIENT)
Dept: OBGYN | Facility: CLINIC | Age: 38
End: 2023-11-16

## 2023-11-16 ENCOUNTER — LAB (OUTPATIENT)
Dept: LAB | Facility: CLINIC | Age: 38
End: 2023-11-16
Payer: COMMERCIAL

## 2023-11-16 DIAGNOSIS — Z13.220 SCREENING FOR CHOLESTEROL LEVEL: ICD-10-CM

## 2023-11-16 DIAGNOSIS — Z13.1 SCREENING FOR DIABETES MELLITUS: ICD-10-CM

## 2023-11-16 LAB
CHOLEST SERPL-MCNC: 178 MG/DL
GLUCOSE BLD-MCNC: 93 MG/DL (ref 60–99)
HDLC SERPL-MCNC: 71 MG/DL
LDLC SERPL CALC-MCNC: 98 MG/DL
NONHDLC SERPL-MCNC: 107 MG/DL
TRIGL SERPL-MCNC: 45 MG/DL

## 2023-11-16 PROCEDURE — 82947 ASSAY GLUCOSE BLOOD QUANT: CPT

## 2023-11-16 PROCEDURE — 80061 LIPID PANEL: CPT

## 2023-11-16 PROCEDURE — 36415 COLL VENOUS BLD VENIPUNCTURE: CPT

## 2023-11-21 ENCOUNTER — TELEPHONE (OUTPATIENT)
Dept: OBGYN | Facility: CLINIC | Age: 38
End: 2023-11-21
Payer: COMMERCIAL

## 2023-11-21 NOTE — TELEPHONE ENCOUNTER
Type of Paperwork received:  [employer] Metric Screening Form      Date Rcvd:  11/16/2023    Rcvd From (Company name): [employer]  Fax: [phone]    Provider:  Natalee Rendon on Provider Cart Date:  11/20/2023    Completed  Delisa Shaw MA on 11/21/2023 at 1:23 PM

## 2023-11-22 NOTE — TELEPHONE ENCOUNTER
Spoke with patient. She is still breast-feeding but is allowed to have flonase. This is helping her symptoms. The respiratory issue is better and she is having less episodes. Once her baby is on regular food and she is back to her baseline she will reassess how the palpitations are affecting her. She will call if they start to progress again or become too bothersome to do her ADLs.

## 2024-01-16 ENCOUNTER — HOSPITAL ENCOUNTER (EMERGENCY)
Facility: CLINIC | Age: 39
Discharge: HOME | End: 2024-01-16
Attending: EMERGENCY MEDICINE | Admitting: EMERGENCY MEDICINE
Payer: COMMERCIAL

## 2024-01-16 VITALS
HEART RATE: 105 BPM | TEMPERATURE: 97.9 F | DIASTOLIC BLOOD PRESSURE: 72 MMHG | RESPIRATION RATE: 23 BRPM | SYSTOLIC BLOOD PRESSURE: 101 MMHG | OXYGEN SATURATION: 95 %

## 2024-01-16 DIAGNOSIS — R19.7 NAUSEA VOMITING AND DIARRHEA: ICD-10-CM

## 2024-01-16 DIAGNOSIS — R11.2 NAUSEA VOMITING AND DIARRHEA: ICD-10-CM

## 2024-01-16 LAB
ALBUMIN SERPL BCG-MCNC: 4.4 G/DL (ref 3.5–5.2)
ALP SERPL-CCNC: 80 U/L (ref 40–150)
ALT SERPL W P-5'-P-CCNC: 15 U/L (ref 0–50)
ANION GAP SERPL CALCULATED.3IONS-SCNC: 9 MMOL/L (ref 7–15)
AST SERPL W P-5'-P-CCNC: 19 U/L (ref 0–45)
ATRIAL RATE - MUSE: 111 BPM
BASOPHILS # BLD AUTO: 0 10E3/UL (ref 0–0.2)
BASOPHILS NFR BLD AUTO: 0 %
BILIRUB SERPL-MCNC: 0.5 MG/DL
BUN SERPL-MCNC: 15.8 MG/DL (ref 6–20)
CALCIUM SERPL-MCNC: 8.4 MG/DL (ref 8.6–10)
CHLORIDE SERPL-SCNC: 108 MMOL/L (ref 98–107)
CREAT SERPL-MCNC: 0.59 MG/DL (ref 0.51–0.95)
DEPRECATED HCO3 PLAS-SCNC: 24 MMOL/L (ref 22–29)
DIASTOLIC BLOOD PRESSURE - MUSE: NORMAL MMHG
EGFRCR SERPLBLD CKD-EPI 2021: >90 ML/MIN/1.73M2
EOSINOPHIL # BLD AUTO: 0.1 10E3/UL (ref 0–0.7)
EOSINOPHIL NFR BLD AUTO: 0 %
ERYTHROCYTE [DISTWIDTH] IN BLOOD BY AUTOMATED COUNT: 13.9 % (ref 10–15)
GLUCOSE SERPL-MCNC: 178 MG/DL (ref 70–99)
HCG SERPL QL: NEGATIVE
HCT VFR BLD AUTO: 41.9 % (ref 35–47)
HGB BLD-MCNC: 13.6 G/DL (ref 11.7–15.7)
HOLD SPECIMEN: NORMAL
IMM GRANULOCYTES # BLD: 0 10E3/UL
IMM GRANULOCYTES NFR BLD: 0 %
INTERPRETATION ECG - MUSE: NORMAL
LIPASE SERPL-CCNC: 24 U/L (ref 13–60)
LYMPHOCYTES # BLD AUTO: 0.5 10E3/UL (ref 0.8–5.3)
LYMPHOCYTES NFR BLD AUTO: 4 %
MCH RBC QN AUTO: 29.1 PG (ref 26.5–33)
MCHC RBC AUTO-ENTMCNC: 32.5 G/DL (ref 31.5–36.5)
MCV RBC AUTO: 90 FL (ref 78–100)
MONOCYTES # BLD AUTO: 0.4 10E3/UL (ref 0–1.3)
MONOCYTES NFR BLD AUTO: 3 %
NEUTROPHILS # BLD AUTO: 12.3 10E3/UL (ref 1.6–8.3)
NEUTROPHILS NFR BLD AUTO: 93 %
NRBC # BLD AUTO: 0 10E3/UL
NRBC BLD AUTO-RTO: 0 /100
P AXIS - MUSE: 86 DEGREES
PLATELET # BLD AUTO: 270 10E3/UL (ref 150–450)
POTASSIUM SERPL-SCNC: 4.1 MMOL/L (ref 3.4–5.3)
PR INTERVAL - MUSE: 138 MS
PROT SERPL-MCNC: 6.8 G/DL (ref 6.4–8.3)
QRS DURATION - MUSE: 90 MS
QT - MUSE: 340 MS
QTC - MUSE: 462 MS
R AXIS - MUSE: 102 DEGREES
RBC # BLD AUTO: 4.68 10E6/UL (ref 3.8–5.2)
SODIUM SERPL-SCNC: 141 MMOL/L (ref 135–145)
SYSTOLIC BLOOD PRESSURE - MUSE: NORMAL MMHG
T AXIS - MUSE: -47 DEGREES
TROPONIN T SERPL HS-MCNC: <6 NG/L
VENTRICULAR RATE- MUSE: 111 BPM
WBC # BLD AUTO: 13.4 10E3/UL (ref 4–11)

## 2024-01-16 PROCEDURE — 84484 ASSAY OF TROPONIN QUANT: CPT | Performed by: EMERGENCY MEDICINE

## 2024-01-16 PROCEDURE — 36415 COLL VENOUS BLD VENIPUNCTURE: CPT | Performed by: EMERGENCY MEDICINE

## 2024-01-16 PROCEDURE — 258N000003 HC RX IP 258 OP 636: Performed by: EMERGENCY MEDICINE

## 2024-01-16 PROCEDURE — 99284 EMERGENCY DEPT VISIT MOD MDM: CPT | Mod: 25

## 2024-01-16 PROCEDURE — 96374 THER/PROPH/DIAG INJ IV PUSH: CPT

## 2024-01-16 PROCEDURE — 83690 ASSAY OF LIPASE: CPT | Performed by: EMERGENCY MEDICINE

## 2024-01-16 PROCEDURE — 93005 ELECTROCARDIOGRAM TRACING: CPT

## 2024-01-16 PROCEDURE — 84703 CHORIONIC GONADOTROPIN ASSAY: CPT | Performed by: EMERGENCY MEDICINE

## 2024-01-16 PROCEDURE — 80053 COMPREHEN METABOLIC PANEL: CPT | Performed by: EMERGENCY MEDICINE

## 2024-01-16 PROCEDURE — 96361 HYDRATE IV INFUSION ADD-ON: CPT

## 2024-01-16 PROCEDURE — 250N000011 HC RX IP 250 OP 636: Performed by: EMERGENCY MEDICINE

## 2024-01-16 PROCEDURE — 85025 COMPLETE CBC W/AUTO DIFF WBC: CPT | Performed by: EMERGENCY MEDICINE

## 2024-01-16 RX ORDER — ONDANSETRON 2 MG/ML
4 INJECTION INTRAMUSCULAR; INTRAVENOUS ONCE
Status: COMPLETED | OUTPATIENT
Start: 2024-01-16 | End: 2024-01-16

## 2024-01-16 RX ORDER — ONDANSETRON 4 MG/1
4 TABLET, ORALLY DISINTEGRATING ORAL EVERY 8 HOURS PRN
Qty: 10 TABLET | Refills: 0 | Status: SHIPPED | OUTPATIENT
Start: 2024-01-16 | End: 2024-01-19

## 2024-01-16 RX ADMIN — ONDANSETRON 4 MG: 2 INJECTION INTRAMUSCULAR; INTRAVENOUS at 04:10

## 2024-01-16 RX ADMIN — SODIUM CHLORIDE, POTASSIUM CHLORIDE, SODIUM LACTATE AND CALCIUM CHLORIDE 1000 ML: 600; 310; 30; 20 INJECTION, SOLUTION INTRAVENOUS at 03:04

## 2024-01-16 RX ADMIN — SODIUM CHLORIDE 1000 ML: 9 INJECTION, SOLUTION INTRAVENOUS at 04:10

## 2024-01-16 ASSESSMENT — ACTIVITIES OF DAILY LIVING (ADL)
ADLS_ACUITY_SCORE: 33
ADLS_ACUITY_SCORE: 35

## 2024-01-16 NOTE — DISCHARGE INSTRUCTIONS
Discharge Instructions  Vomiting    You have been seen today for vomiting (throwing up). This is usually caused by a virus, but some bacteria, parasites, medicines or other medical conditions can cause similar symptoms. At this time your provider does not find that your vomiting is a sign of anything dangerous or life-threatening. However, sometimes the signs of serious illness do not show up right away. If you have new or worse symptoms, you may need to be seen again in the Emergency Department or by your primary provider. Remember that serious problems like appendicitis can start as vomiting.    Generally, every Emergency Department visit should have a follow-up clinic visit with either a primary or a specialty clinic/provider. Please follow-up as instructed by your emergency provider today.    Return to the Emergency Department if:  You keep vomiting and you are not able to keep liquids down.   You feel you are getting dehydrated, such as being very thirsty, not urinating (peeing) at least every 8-12 hours, or feeling faint or lightheaded.   You develop a new fever, or your fever continues for more than 2 days.   You have abdominal (belly pain) that seems worse than cramps, is in one spot, or is getting worse over time. Appendicitis usually causes pain in the right lower abdomen (to the right and below your belly button) so watch for pain in this location.  You have blood in your vomit or stools.   You feel very weak.  You are not starting to improve within 24 hours of your visit here.     What can I do to help myself?  The most important thing to do is to drink clear liquids. If you have been vomiting a lot, it is best to have only small, frequent sips of liquids. Drinking too much at once may cause more vomiting. If you are vomiting often, you must replace minerals, sodium and potassium lost with your illness. Pedialyte  is the best available rehydration liquid but some find that it doesn t taste good so sports  drinks are an alterative. You can also drink clear liquids such as water, weak tea, apple juice, and 7-Up . Avoid acid liquids (orange), caffeine (coffee) or alcohol. Do not drink milk until you no longer have diarrhea (loose stools).   After liquids are staying down, you may start eating mild foods. Soda crackers, toast, plain noodles, gelatin, applesauce and bananas are good first choices. Avoid foods that have acid, are spicy, fatty or have a lot of fiber (such as meats, coarse grains, vegetables). You may start eating these foods again in about 3 days when you are better.   Sometimes treatment includes prescription medicine to prevent nausea (sick to your stomach) and vomiting. If your provider prescribes these for you, take them as directed.   Do not take ibuprofen, naproxen, or other nonsteroidal anti-inflammatory (NSAID) medicines without checking with your healthcare provider.     If you were given a prescription for medicine here today, be sure to read all of the information (including the package insert) that comes with your prescription.  This will include important information about the medicine, its side effects, and any warnings that you need to know about.  The pharmacist who fills the prescription can provide more information and answer questions you may have about the medicine.  If you have questions or concerns that the pharmacist cannot address, please call or return to the Emergency Department.     Remember that you can always come back to the Emergency Department if you are not able to see your regular provider in the amount of time listed above, if you get any new symptoms, or if there is anything that worries you.       Discharge Instructions  Adult Diarrhea    You have been seen today for diarrhea (loose stools). This is usually caused by a virus, but some bacteria, parasites, medicines, or other medical conditions can cause similar symptoms. At this time your provider does not find that your  diarrhea is a sign of anything dangerous or life-threatening. However, sometimes the signs of serious illness do not show up right away. If you have new or worse symptoms, you may need to be seen again in the Emergency Department or by your primary provider.     Generally, every Emergency Department visit should have a follow-up clinic visit with either a primary or a specialty clinic/provider. Please follow-up as instructed by your emergency provider today.    Return to the Emergency Department if:  You feel you are getting dehydrated, such as being very thirsty, not urinating (peeing) like usual, or feeling faint or lightheaded.   You develop a new fever.  You have abdominal (belly) pain that seems worse than cramps, is in one spot, or is getting worse over time.   You have blood in your stool or your stool becomes black.  (Remember that if you take Pepto-Bismol , this will turn your stool black).   You feel very weak.    What can I do to help myself?  The most important thing to do is to drink clear liquids.   It is best to have only small, frequent sips of liquids. Drinking too much at once may cause more diarrhea. You should also replace minerals, sodium and potassium lost with diarrhea. Pedialyte  and sports drinks can help you replace these minerals. You can also drink clear liquids such as water, weak tea, apple juice, and 7-Up . Avoid acidic liquids (orange juice), caffeine (coffee) or alcohol. Milk products will make the diarrhea worse.  Eat bland (plain) foods. Soda crackers, toast, plain noodles, gelatin, applesauce and bananas are good first choices. Avoid foods that have acid, are spicy, fatty or fibrous (such as meats, coarse grains, vegetables). You may start eating these foods again in about 3 days when you are better.   Sometimes treatment includes prescription medicine to prevent diarrhea. If your provider prescribes these for you, take them as directed.   Nonprescription medicine is available for  "the treatment of diarrhea and can be very effective. If you use it, make sure you use the dose recommended on the package. Check with your healthcare provider before you use any medicine for diarrhea.   Do not take ibuprofen, or other nonsteroidal anti-inflammatory medicines, without checking with your healthcare provider.   Probiotics: If you have been given an antibiotic, you may want to also take a probiotic pill or eat yogurt with live cultures. Probiotics have \"good bacteria\" to help your intestines stay healthy. Studies have shown that probiotics help prevent diarrhea and other intestine problems (including C. diff infection) when you take antibiotics. You can buy these without a prescription in the pharmacy section of the store.   If you were given a prescription for medicine here today, be sure to read all of the information (including the package insert) that comes with your prescription.  This will include important information about the medicine, its side effects, and any warnings that you need to know about.  The pharmacist who fills the prescription can provide more information and answer questions you may have about the medicine.  If you have questions or concerns that the pharmacist cannot address, please call or return to the Emergency Department.  Remember that you can always come back to the Emergency Department if you are not able to see your regular provider in the amount of time listed above, if you get any new symptoms, or if there is anything that worries you.   "

## 2024-01-16 NOTE — ED TRIAGE NOTES
Patient presents with N/V/D and dizziness. Has a child at home that's vomiting. Symptoms started 6 hours ago. Hx going into Afib when under stress and not feeling well.

## 2024-01-16 NOTE — ED PROVIDER NOTES
History     Chief Complaint:  Nausea, Vomiting, & Diarrhea       HPI   Debbi Grossman is a 38 year old female with history of intermittent atrial fibrillation who presents to the emergency department with concerns about nausea, vomiting and diarrhea.  She notes that her child at home has similar symptoms with intermittent vomiting.  She notes that her vomiting has been quite persistent.  It started with diarrhea and then vomiting symptoms came later.  She not having abdominal pain, but notes that she has not been able to keep anything down and has lost quite a bit of diarrhea.  On arrival she had an IV placed and notes that she is feeling improved.  At home she was worried because she had episodes where she felt like she went into atrial fibrillation and her heart rate was near 150.  She notes that stress induces her atrial fibrillation.  Not having any chest pain.  She is here with her .  She does not take medications for her atrial fibrillation.        Medications:    ondansetron (ZOFRAN ODT) 4 MG ODT tab  Docosahexaenoic Acid (DHA PO)  norethindrone (MICRONOR) 0.35 MG tablet  Prenatal Multivit-Min-Fe-FA (PRENATAL VITAMINS PO)        Past Medical History:    Past Medical History:   Diagnosis Date    Acute blood loss anemia 2019    Anxiety     CP (cerebral palsy) (H)     History of blood transfusion     Infection due to 2019 novel coronavirus 2022    PAF (paroxysmal atrial fibrillation) (H)     Scoliosis 05/10/2012    Seasonal allergies     Status post  delivery 2019       Past Surgical History:    Past Surgical History:   Procedure Laterality Date    BAND HEMORRHOIDECTOMY  2023     SECTION N/A 2019    Procedure:  SECTION;  Surgeon: Natalee Farris MD;  Location:  [facility]     SECTION N/A 2022    Procedure: REPEAT  SECTION;  Surgeon: Natalee Farris MD;  Location:  [facility]    CYSTOSCOPY N/A 2019    Procedure: CYSTOSCOPY;   Surgeon: Natalee Farris MD;  Location:  OR    CYSTOSCOPY, CYSTOGRAM, COMBINED N/A 08/23/2019    Procedure: Cystoscopy, cystogram, combined;  Surgeon: Pedro Luis Mireles MD;  Location:  OR    CYSTOSCOPY, RETROGRADES, INSERT STENT URETER(S), COMBINED  08/23/2019    Procedure: CYSTOSCOPY WITH BILATERAL RETROGRADES/ BILATERAL STENT PLACEMENT AND REMOVAL/ CYSTOGRAM/ BLADDER REPAIR;  Surgeon: Pedro Luis Mireles MD;  Location:  OR    HC REMOVE TONSILS/ADENOIDS,<11 Y/O      HC TOOTH EXTRACTION W/FORCEP      [place] [facility]    LAPAROTOMY EXPLORATORY N/A 08/23/2019    Procedure: EXPLORATORY LAPAROTOMY WITH REPEAT EXPLORATORY LAPAROTOMY WITH BLADDER REPAIR;  Surgeon: Natalee Farris MD;  Location:  OR    REPAIR BLADDER N/A 08/23/2019    Procedure: REPAIR, BLADDER;  Surgeon: Natalee Farris MD;  Location:  OR          Physical Exam   Patient Vitals for the past 24 hrs:   BP Temp Temp src Pulse Resp SpO2   01/16/24 0502 101/72 -- -- 105 23 95 %   01/16/24 0400 107/72 -- -- 91 14 98 %   01/16/24 0345 -- -- -- 85 15 98 %   01/16/24 0219 103/66 -- -- -- -- --   01/16/24 0217 -- 97.9  F (36.6  C) Oral 61 16 94 %        Physical Exam  Eyes:  The pupils are equal and round    Conjunctivae and sclerae are normal  ENT:    The nose is normal    Pinnae are normal  CV:  Regular rate and rhythm     No edema  Resp:  Lungs are clear    Non-labored    No rales    No wheezing   GI:  Abdomen is soft and non-tender, there is no rigidity    No distension    No rebound tenderness   MS:  Normal muscular tone    No asymmetric leg swelling  Skin:  No rash or acute skin lesions noted  Neuro:   Awake, alert.      Speech is normal and fluent.    Face is symmetric.     Moves all extremities    Emergency Department Course   ECG  ECG results from 01/16/24   EKG 12 lead     Value    Systolic Blood Pressure     Diastolic Blood Pressure     Ventricular Rate 111    Atrial Rate 111    IN Interval 138    QRS Duration 90         QTc 462    P Axis 86    R AXIS 102    T Axis -47    Interpretation ECG      ** Poor data quality, interpretation may be adversely affected  Sinus tachycardia  Rightward axis  ST & T wave abnormality, consider inferolateral ischemia  Abnormal ECG  When compared with ECG of 27-DEC-2022 08:27,  Sinus rhythm has replaced Atrial fibrillation  T wave inversion more evident in Inferior leads  T wave inversion now evident in Lateral leads  Confirmed by GENERATED REPORT, COMPUTER ([id number]),  Fabian Alexandre ([id number]) on 1/16/2024 4:31:06 AM          Laboratory:  Labs Ordered and Resulted from Time of ED Arrival to Time of ED Departure   COMPREHENSIVE METABOLIC PANEL - Abnormal       Result Value    Sodium 141      Potassium 4.1      Carbon Dioxide (CO2) 24      Anion Gap 9      Urea Nitrogen 15.8      Creatinine 0.59      GFR Estimate >90      Calcium 8.4 (*)     Chloride 108 (*)     Glucose 178 (*)     Alkaline Phosphatase 80      AST 19      ALT 15      Protein Total 6.8      Albumin 4.4      Bilirubin Total 0.5     CBC WITH PLATELETS AND DIFFERENTIAL - Abnormal    WBC Count 13.4 (*)     RBC Count 4.68      Hemoglobin 13.6      Hematocrit 41.9      MCV 90      MCH 29.1      MCHC 32.5      RDW 13.9      Platelet Count 270      % Neutrophils 93      % Lymphocytes 4      % Monocytes 3      % Eosinophils 0      % Basophils 0      % Immature Granulocytes 0      NRBCs per 100 WBC 0      Absolute Neutrophils 12.3 (*)     Absolute Lymphocytes 0.5 (*)     Absolute Monocytes 0.4      Absolute Eosinophils 0.1      Absolute Basophils 0.0      Absolute Immature Granulocytes 0.0      Absolute NRBCs 0.0     LIPASE - Normal    Lipase 24     HCG QUALITATIVE PREGNANCY - Normal    hCG Serum Qualitative Negative     TROPONIN T, HIGH SENSITIVITY - Normal    Troponin T, High Sensitivity <6          Emergency Department Course & Assessments:    Interventions:  Medications   lactated ringers BOLUS 1,000 mL (0 mLs Intravenous Stopped 1/16/24 0417)    sodium chloride 0.9% BOLUS 1,000 mL (0 mLs Intravenous Stopped 1/16/24 9431)   ondansetron (ZOFRAN) injection 4 mg (4 mg Intravenous $Given 1/16/24 5410)        Disposition:  The patient was discharged to home.     Impression & Plan      Medical Decision Making:  Debbi Grossman is a 38 year old female who presents to the emergency department with nausea, vomiting and diarrhea.  She notes that she is feeling much improved after feeling unwell at home.  She had an episode of atrial fibrillation that occurred with a heart rate near 150.  Heart rate is symptoms come down and she has been feeling somewhat improved.  She had EKG performed which shows nonspecific changes.  Given the EKG findings recommended a troponin which was obtained and undetectable.  She is not having any chest pain or shortness of breath here.  Patient's creatinine and potassium levels are normal.  She notes that her nausea is improved after medicating at home with Pepto-Bismol.  She is given additional Zofran here.  She given IV fluids.  She is feeling well enough for discharge.  Her white blood cell count was slightly elevated but suspect this is related to her vomiting illness.  She has no abdominal tenderness on exam.  She says she feels somewhat nauseous with palpation but does not have localized tenderness.  Pregnancy test is negative.  Lipase and LFTs without significant abnormalities.  Suspect hyperglycemia is both illness related and patient reports that she ate quite a bit of foods with sugar in them prior to arrival.  She is encouraged to follow-up with her PCP.  Return to the ER with any new or worrisome symptoms.        Diagnosis:    ICD-10-CM    1. Nausea vomiting and diarrhea  R11.2     R19.7            Discharge Medications:  Discharge Medication List as of 1/16/2024  5:02 AM        START taking these medications    Details   ondansetron (ZOFRAN ODT) 4 MG ODT tab Take 1 tablet (4 mg) by mouth every 8 hours as needed for nausea,  Disp-10 tablet, R-0, Local Print                Jethro Gore MD  1/16/2024   No att. providers found              Jethro Gore MD  01/16/24 0601

## 2024-01-23 ENCOUNTER — OFFICE VISIT (OUTPATIENT)
Dept: DERMATOLOGY | Facility: CLINIC | Age: 39
End: 2024-01-23
Payer: COMMERCIAL

## 2024-01-23 DIAGNOSIS — D22.9 MULTIPLE BENIGN NEVI: Primary | ICD-10-CM

## 2024-01-23 DIAGNOSIS — L70.0 ACNE VULGARIS: ICD-10-CM

## 2024-01-23 PROCEDURE — 99204 OFFICE O/P NEW MOD 45 MIN: CPT | Mod: GC | Performed by: STUDENT IN AN ORGANIZED HEALTH CARE EDUCATION/TRAINING PROGRAM

## 2024-01-23 RX ORDER — TRETINOIN 0.25 MG/G
CREAM TOPICAL
Qty: 45 G | Refills: 6 | Status: SHIPPED | OUTPATIENT
Start: 2024-01-23

## 2024-01-23 NOTE — PROGRESS NOTES
[facility] Dermatology Note  Encounter Date: Jan 23, 2024  Office Visit     Dermatology Problem List:  1. Acne Vulgaris  - Tretinoin 0.025% cream  2. Multiple Benign Nevi    ____________________________________________    Assessment & Plan:     # Acne Vulagris  # Photoaging  Patient notes longstanding history of acne, and concern for aging.  She is looking for treatments that can help with improving this.  We recommended tretinoin 0.025% cream to use nightly (slowly increasing frequency up to nightly as tolerated).  Also recommended facial cleansers such as CeraVe, and regular moisturizer use.  Can also consider other topical such as niacinamide, glycolic acid, or alphahydroxy acids.  Patient expressed understanding with the plan.  -Tretinoin 0.025% cream nightly  -daily use of facial cleanser (e.g. CeraVe)  -Regular moisturizer use  -Strong encouraged sun protection, including sunscreen to plus SPF. Sun protection handout given.    # Multiple Benign Nevi  # Cherry Angioma  Reassured regarding benign etiology. No intervention indicated.  - Sun protection, including sunscreen 30+ SPF advised   - ABCDEs of melanoma reviewed, hand out provided    Procedures Performed:   N/A    Follow-up: prn for new or changing lesions, or for follow up of acne if desired    Staff and Resident:     Daniel Sanders MD  Internal Medicine - Dermatology (PGY-3)   ____________________________________________    CC: Derm Problem (FBSE )    HPI:  Ms. Debbi Grossman is a(n) 38 year old female who presents today as a new patient for skin check.  Patient has multiple moles throughout her body, and she is wondering whether these need to be evaluated further.  Patient also has concerns for acne, which she has had since adolescence, and she is wondering whether there is some topical treatments for this.  She will is also wondering whether there is some other treatments for aging as well. Patient is otherwise feeling  well.    Labs Reviewed:  N/A    Physical Exam:  Vitals: There were no vitals taken for this visit.  SKIN: Total skin excluding the undergarment areas was performed. The exam included the head/face, neck, both arms, chest, back, abdomen, both legs, digits and/or nails.   -The trunk and extremities there are numerous pigmented macules and papules with regular borders and irregular pigmentation network on dermoscopy  -On the trunk and extremities there are few sparse red vascular circular papules  -On the face there are few inflammatory papules on the background of oily skin  - No other lesions of concern on areas examined.     Medications:  Current Outpatient Medications   Medication    Docosahexaenoic Acid (DHA PO)    norethindrone (MICRONOR) 0.35 MG tablet    Prenatal Multivit-Min-Fe-FA (PRENATAL VITAMINS PO)    tretinoin (RETIN-A) 0.025 % external cream     No current facility-administered medications for this visit.      Past Medical History:   Patient Active Problem List   Diagnosis    Scoliosis    Inflammatory acne    Comedonal acne    Chronic nasal congestion    Acute seasonal allergic rhinitis, unspecified trigger    Acute blood loss anemia    Supervision of high-risk pregnancy    AMA (advanced maternal age) multigravida 35+    PAF (paroxysmal atrial fibrillation) (H)    Status post  delivery     Past Medical History:   Diagnosis Date    Acute blood loss anemia 2019    Anxiety     Therapy--primarily due to last delivery and fear around this delivery    CP (cerebral palsy) (H)     History of blood transfusion     Infection due to 2019 novel coronavirus 2022    cough    PAF (paroxysmal atrial fibrillation) (H)     ,  --both episodes during labor    Scoliosis 05/10/2012    Seasonal allergies     Status post  delivery 2019       CC Referred Self, MD  No address on file on close of this encounter.

## 2024-01-23 NOTE — LETTER
1/23/2024       RE: Debbi Grossman  [address]  [facility] [address]     Dear Colleague,    Thank you for referring your patient, Debbi Grossman, to the [facility]MAT[facility] [place] [facility]. Please see a copy of my visit note below.    Dermatology Rooming Note    Debbi Grossman's goals for this visit include:   Chief Complaint   Patient presents with    Derm Problem     FBSE      Alex Caceres, EMT-B      [facility] Dermatology Note  Encounter Date: Jan 23, 2024  Office Visit     Dermatology Problem List:  1. Acne Vulgaris  - Tretinoin 0.025% cream  2. Multiple Benign Nevi    ____________________________________________    Assessment & Plan:     # Acne Vulagris  # Photoaging  Patient notes longstanding history of acne, and concern for aging.  She is looking for treatments that can help with improving this.  We recommended tretinoin 0.025% cream to use nightly (slowly increasing frequency up to nightly as tolerated).  Also recommended facial cleansers such as CeraVe, and regular moisturizer use.  Can also consider other topical such as niacinamide, glycolic acid, or alphahydroxy acids.  Patient expressed understanding with the plan.  -Tretinoin 0.025% cream nightly  -daily use of facial cleanser (e.g. CeraVe)  -Regular moisturizer use  -Strong encouraged sun protection, including sunscreen to plus SPF. Sun protection handout given.    # Multiple Benign Nevi  # Cherry Angioma  Reassured regarding benign etiology. No intervention indicated.  - Sun protection, including sunscreen 30+ SPF advised   - ABCDEs of melanoma reviewed, hand out provided    Procedures Performed:   N/A    Follow-up: prn for new or changing lesions, or for follow up of acne if desired    Staff and Resident:     Daniel Sanders MD  Internal Medicine - Dermatology (PGY-3)   ____________________________________________    CC: Derm Problem (FBSE  )    HPI:  Ms. Debbi Grossman is a(n) 38 year old female who presents today as a new patient for skin check.  Patient has multiple moles throughout her body, and she is wondering whether these need to be evaluated further.  Patient also has concerns for acne, which she has had since adolescence, and she is wondering whether there is some topical treatments for this.  She will is also wondering whether there is some other treatments for aging as well. Patient is otherwise feeling well.    Labs Reviewed:  N/A    Physical Exam:  Vitals: There were no vitals taken for this visit.  SKIN: Total skin excluding the undergarment areas was performed. The exam included the head/face, neck, both arms, chest, back, abdomen, both legs, digits and/or nails.   -The trunk and extremities there are numerous pigmented macules and papules with regular borders and irregular pigmentation network on dermoscopy  -On the trunk and extremities there are few sparse red vascular circular papules  -On the face there are few inflammatory papules on the background of oily skin  - No other lesions of concern on areas examined.     Medications:  Current Outpatient Medications   Medication    Docosahexaenoic Acid (DHA PO)    norethindrone (MICRONOR) 0.35 MG tablet    Prenatal Multivit-Min-Fe-FA (PRENATAL VITAMINS PO)    tretinoin (RETIN-A) 0.025 % external cream     No current facility-administered medications for this visit.      Past Medical History:   Patient Active Problem List   Diagnosis    Scoliosis    Inflammatory acne    Comedonal acne    Chronic nasal congestion    Acute seasonal allergic rhinitis, unspecified trigger    Acute blood loss anemia    Supervision of high-risk pregnancy    AMA (advanced maternal age) multigravida 35+    PAF (paroxysmal atrial fibrillation) (H)    Status post  delivery     Past Medical History:   Diagnosis Date    Acute blood loss anemia 2019    Anxiety     Therapy--primarily due to last delivery  and fear around this delivery    CP (cerebral palsy) (H)     History of blood transfusion     Infection due to 2019 novel coronavirus 2022    cough    PAF (paroxysmal atrial fibrillation) (H)     ,  --both episodes during labor    Scoliosis 05/10/2012    Seasonal allergies     Status post  delivery 2019       CC Referred Self, MD  No address on file on close of this encounter.    Attestation signed by Herbert Dickens MD at 2024  4:32 PM:  I have personally examined this patient and agree with the resident doctor's documentation and plan of care. I have reviewed and amended the resident's note. The documentation accurately reflects my clinical observations, diagnoses, treatment and follow-up plans.     Herbert Dickens MD  Dermatology Staff

## 2024-01-23 NOTE — PATIENT INSTRUCTIONS
For acne and skin aging:  - Tretinoin 0.025% cream. Start 1-2 nights per week, then increase to 2-4 nights per week, then nightly  - Use face wash such as cerave  - Topicals such as glycolic acid or alpha hydroxy acids (AHAs) or niacinamide  - Recommend applying moisturizer at night  - Recommend continued sunscreen (30+ SPF) in morning                  Checking for Skin Cancer  You can help find cancer early by checking your skin each month. There are 3 main kinds of skin cancer: melanoma, basal cell carcinoma, and squamous cell carcinoma. Doing monthly skin checks is the best way to find new marks, sores, or skin changes. Follow these instructions for checking your skin.   The ABCDEs of checking moles for melanoma   Check your moles or growths for signs of melanoma using ABCDE:   Asymmetry: The sides of the mole or growth don t match.  Border: The edges are ragged, notched, or blurred.  Color: The color within the mole or growth varies. It could be black, brown, tan, white, or shades of red, gray, or blue.  Diameter: The mole or growth is larger than   inch or 6 mm (size of a pencil eraser).  Evolving: The size, shape, texture, or color of the mole or growth is changing.     ABCDE's of moles on light skin.        ABCDE's of moles on dark skin may be harder to identify.     Checking for other types of skin cancer  Basal cell carcinoma or squamous cell carcinoma cause symptoms like:     A spot or mole that looks different from all other marks on your skin  Changes in how an area feels, such as itching, tenderness, or pain  Changes in the skin's surface, such as oozing, bleeding, or scaliness  A sore that doesn't heal  New swelling, redness, or spread of color beyond the border of a mole    Who s at risk?  Anyone of any skin color can get skin cancer. But you're at greater risk if you have:   Fair skin that freckles easily and burns instead of tanning  Light-colored or red hair  Light-colored eyes  Many moles or  abnormal moles on your skin  A long history of unprotected exposure to sunlight or tanning beds  A history of many blistering sunburns as a child or teen  A family history of skin cancer  Been exposed to radiation or chemicals  A weakened immune system  Been exposed to arsenic  If you've had skin cancer in the past, you're at high risk of having it again.   How to check your skin  Do your monthly skin checkups in front of a full-length mirror. Use a room with good lighting so it's easier to see. Use a hand mirror to look at hard-to-see places like your buttocks and back. You can also have a trusted friend or family member help you with these checks. Check every part of your body, including your:   Head (ears, face, neck, and scalp)  Torso (front, back, sides, and under breasts)  Arms (tops, undersides, and armpits)  Hands (palms, backs, and fingers, including under the nails)  Lower back, buttocks, and genitals  Legs (front, back, and sides)  Feet (tops, soles, toes, including under the nails, and between toes)  Watch for new spots on your skin or a spot that's changing in color, shape, size.   If you have a lot of moles, take digital photos of them each month. Make sure to take photos both up close and from a distance. These can help you see if any moles change over time.   Know your skin  Most skin changes aren't cancer. But if you see any changes in your skin, call your healthcare provider right away. Only they can tell you if a change is a problem. If you have skin cancer, seeing your provider can be the first step to getting the treatment that could save your life.   [employer] last reviewed this educational content on 10/1/2021    6151-0560 [employer]. All rights reserved. This information is not intended as a substitute for professional medical care. Always follow your healthcare professional's instructions.

## 2024-02-11 ENCOUNTER — HEALTH MAINTENANCE LETTER (OUTPATIENT)
Age: 39
End: 2024-02-11

## 2024-04-15 PROBLEM — D62 ACUTE BLOOD LOSS ANEMIA: Status: RESOLVED | Noted: 2019-08-24 | Resolved: 2024-04-15

## 2024-04-15 PROBLEM — O09.529 AMA (ADVANCED MATERNAL AGE) MULTIGRAVIDA 35+: Status: RESOLVED | Noted: 2022-05-18 | Resolved: 2024-04-15

## 2024-04-15 PROBLEM — O09.90 SUPERVISION OF HIGH-RISK PREGNANCY: Status: RESOLVED | Noted: 2022-05-18 | Resolved: 2024-04-15

## 2024-04-15 PROBLEM — Z98.891 STATUS POST CESAREAN DELIVERY: Status: RESOLVED | Noted: 2022-12-27 | Resolved: 2024-04-15

## 2024-04-16 ENCOUNTER — OFFICE VISIT (OUTPATIENT)
Dept: OBGYN | Facility: CLINIC | Age: 39
End: 2024-04-16
Payer: COMMERCIAL

## 2024-04-16 VITALS
BODY MASS INDEX: 18.61 KG/M2 | SYSTOLIC BLOOD PRESSURE: 102 MMHG | DIASTOLIC BLOOD PRESSURE: 70 MMHG | WEIGHT: 130 LBS | HEIGHT: 70 IN

## 2024-04-16 DIAGNOSIS — Z13.1 SCREENING FOR DIABETES MELLITUS: ICD-10-CM

## 2024-04-16 DIAGNOSIS — Z01.419 ENCOUNTER FOR GYNECOLOGICAL EXAMINATION WITHOUT ABNORMAL FINDING: Primary | ICD-10-CM

## 2024-04-16 DIAGNOSIS — Z13.6 ENCOUNTER FOR LIPID SCREENING FOR CARDIOVASCULAR DISEASE: ICD-10-CM

## 2024-04-16 DIAGNOSIS — Z13.220 ENCOUNTER FOR LIPID SCREENING FOR CARDIOVASCULAR DISEASE: ICD-10-CM

## 2024-04-16 PROCEDURE — 99395 PREV VISIT EST AGE 18-39: CPT | Performed by: OBSTETRICS & GYNECOLOGY

## 2024-04-16 ASSESSMENT — ANXIETY QUESTIONNAIRES
6. BECOMING EASILY ANNOYED OR IRRITABLE: NOT AT ALL
GAD7 TOTAL SCORE: 3
3. WORRYING TOO MUCH ABOUT DIFFERENT THINGS: SEVERAL DAYS
IF YOU CHECKED OFF ANY PROBLEMS ON THIS QUESTIONNAIRE, HOW DIFFICULT HAVE THESE PROBLEMS MADE IT FOR YOU TO DO YOUR WORK, TAKE CARE OF THINGS AT HOME, OR GET ALONG WITH OTHER PEOPLE: SOMEWHAT DIFFICULT
5. BEING SO RESTLESS THAT IT IS HARD TO SIT STILL: NOT AT ALL
2. NOT BEING ABLE TO STOP OR CONTROL WORRYING: SEVERAL DAYS
1. FEELING NERVOUS, ANXIOUS, OR ON EDGE: SEVERAL DAYS
GAD7 TOTAL SCORE: 3
7. FEELING AFRAID AS IF SOMETHING AWFUL MIGHT HAPPEN: NOT AT ALL

## 2024-04-16 ASSESSMENT — PATIENT HEALTH QUESTIONNAIRE - PHQ9
5. POOR APPETITE OR OVEREATING: NOT AT ALL
SUM OF ALL RESPONSES TO PHQ QUESTIONS 1-9: 3

## 2024-04-16 NOTE — PATIENT INSTRUCTIONS
Return to clinic for screening Lipids and Fasting Blood sugar for biometric screening.  Form to be completed and returned.  Follow up with your primary care provider for your other medical problems.  Continue self breast exam.  Increase physical activity and exercise.  Lab results will be called to the patient.  Usual safety and preventative measures counseling done.  Last pap smear (2023) was normal and negative for the DNA of high risk HPV subtypes.  No pap was obtained this year.  This was discussed with the patient and she agrees with the plan.   Will monitor menses with completion of breast feeding.  If no periods within 3 months, will notify us.  Discussed intervention with progesterone in setting of oligomenorrhea at least every 3months if no periods.  Discussed OCPs vs progesterone course vs IUD/nexplanon, etc.

## 2024-04-16 NOTE — PROGRESS NOTES
Debbi is a 38 year old  female who presents for annual exam.     Besides routine health maintenance, has a few questions to discuss. Needs lab order for biometric screening form.    HPI:    Here today for yearly exam -doing well.  Still breast feeding ~2-3x/day and has not resumed periods.  History of oligomenorrhea prior to children --had maybe 3 periods per year.  Was on progesterone only pill until Braeden had his vasectomy.  +SA --no issues.  Denies bowel/bladder issues. No urgency or incontinence issues    ; working for [employer] --still in office 1.5d/wk; kids are doing great --Ana Laura is total spitfire and Troy in  a few days per week  -staying active with running; also busy with kids  +SBE with breast feeding --no issues; discussed mammos at 40  No PCP but is still seeing cardiology due to AFib --has had a few episodes this year --all triggered by events --dehydration/illness, postsurgery, etc.  Seeing cardiology again soon  -would like to return for fasting bloodwork for biometric screening        GYNECOLOGIC HISTORY:    No LMP recorded. (Menstrual status: UNKNOWN).  Regular menses? No, still breast feeding  Her current contraception method is: vasectomy.  She  reports that she has never smoked. She has never used smokeless tobacco.  Patient is sexually active.  STD testing offered? Declined    Last PHQ-9 score on record =       2024     4:27 PM   PHQ-9 SCORE   PHQ-9 Total Score 3     Last GAD7 score on record =       2024     4:27 PM   ANDRZEJ-7 SCORE   Total Score 3     Alcohol Score = 2    HEALTH MAINTENANCE:  Cholesterol:   Recent Labs   Lab Test 23  0812 22  1620   CHOL 178 263*   HDL 71 100   LDL 98 130*   TRIG 45 166*     Last Mammo: N/A, Result: not applicable, Next Mammo: screen age 40  Pap:   Lab Results   Component Value Date    PAP NIL, NEG-HPV 2023    PAP NIL, NEG-HPV 2018    PAP NIL 2015    PAP NIL 05/10/2012   Colonoscopy:  N/A, Result:  not applicable, Next Colonoscopy: screen age 45  Dexa:  N/A  Health maintenance reviewed.     HISTORY:  OB History    Para Term  AB Living   2 2 2 0 0 2   SAB IAB Ectopic Multiple Live Births   0 0 0 0 2      # Outcome Date GA Lbr Kyle/2nd Weight Sex Type Anes PTL Lv   2 Term 22 39w3d  4.2 kg (9 lb 4.2 oz) F CS-LTranv Spinal N BETINA      Name: NIALL COCHRAN-DIMPLE      Apgar1: 8  Apgar5: 9   1 Term 19 40w0d 17:00 / 04:29 3.7 kg (8 lb 2.5 oz) M CS-LTranv EPI N BETINA      Complications: Failure to Progress in Second Stage, Cephalopelvic Disproportion, Paroxysmal atrial fibrillation (H)      Name: Kristyn      Apgar1: 8  Apgar5: 9       Patient Active Problem List   Diagnosis    Scoliosis    Inflammatory acne    Comedonal acne    Chronic nasal congestion    Acute seasonal allergic rhinitis, unspecified trigger    PAF (paroxysmal atrial fibrillation) (H)     Past Surgical History:   Procedure Laterality Date    BAND HEMORRHOIDECTOMY  2023     SECTION N/A 2019    Procedure:  SECTION;  Surgeon: Natalee Farris MD;  Location:  [facility]     SECTION N/A 2022    Procedure: REPEAT  SECTION;  Surgeon: Natalee Farris MD;  Location:  [facility]    CYSTOSCOPY N/A 2019    Procedure: CYSTOSCOPY;  Surgeon: Natalee Farris MD;  Location:  OR    CYSTOSCOPY, CYSTOGRAM, COMBINED N/A 2019    Procedure: Cystoscopy, cystogram, combined;  Surgeon: Pedro Luis Mireles MD;  Location:  OR    CYSTOSCOPY, RETROGRADES, INSERT STENT URETER(S), COMBINED  2019    Procedure: CYSTOSCOPY WITH BILATERAL RETROGRADES/ BILATERAL STENT PLACEMENT AND REMOVAL/ CYSTOGRAM/ BLADDER REPAIR;  Surgeon: Pedro Luis Mireles MD;  Location:  OR     REMOVE TONSILS/ADENOIDS,<11 Y/O      HC TOOTH EXTRACTION W/FORCEP      [place] [facility]    LAPAROTOMY EXPLORATORY N/A 2019    Procedure: EXPLORATORY LAPAROTOMY WITH REPEAT EXPLORATORY LAPAROTOMY WITH BLADDER REPAIR;   "Surgeon: Natalee Farris MD;  Location: [facility]    REPAIR BLADDER N/A 08/23/2019    Procedure: REPAIR, BLADDER;  Surgeon: Natalee Farris MD;  Location: [facility] OR      Social History     Tobacco Use    Smoking status: Never    Smokeless tobacco: Never   Substance Use Topics    Alcohol use: Yes     Comment: Less than once a month      Problem (# of Occurrences) Relation (Name,Age of Onset)    Depression (1) Brother: bipolar depression    Diabetes (2) Father, Maternal Grandfather    Hypertension (1) Father    Neurologic Disorder (2) Sister, Brother: epliepsy    Psychotic Disorder (1) Maternal Grandmother    Cancer - colorectal (1) Maternal Grandfather    Prostate Cancer (1) Maternal Grandfather    Transient ischemic attack (1) Father    Skin Cancer (2) Mother, Paternal Grandmother    No Known Problems (2) Paternal Grandfather, Other              Current Outpatient Medications   Medication Sig Dispense Refill    Prenatal Multivit-Min-Fe-FA (PRENATAL VITAMINS PO) Take 1 tablet by mouth      tretinoin (RETIN-A) 0.025 % external cream Use every night. 45 g 6    norethindrone (MICRONOR) 0.35 MG tablet Take 1 tablet (0.35 mg) by mouth daily (Patient not taking: Reported on 4/16/2024) 90 tablet 3     No current facility-administered medications for this visit.     Allergies   Allergen Reactions    Seasonal Allergies        Past medical, surgical, social and family histories were reviewed and updated in EPIC.    ROS:   12 point review of systems negative other than symptoms noted below or in the HPI.  No urinary frequency or dysuria, bladder or kidney problems    EXAM:  /70   Ht 1.778 m (5' 10\")   Wt 59 kg (130 lb)   Breastfeeding Yes   BMI 18.65 kg/m     BMI: Body mass index is 18.65 kg/m .    PHYSICAL EXAM:  Constitutional:   Appearance: Well nourished, well developed, alert, in no acute distress  Neck:  Lymph Nodes:  No lymphadenopathy present    Thyroid:  Gland size normal, nontender, no nodules or masses " present  on palpation  Chest:  Respiratory Effort:  Breathing unlabored  Cardiovascular:    Heart: Auscultation:  Regular rate, normal rhythm, no murmurs present  Breasts: Palpation of Breasts and Axillae:  No masses present on palpation, no breast tenderness., Axillary Lymph Nodes:  No lymphadenopathy present., and No nodularity, asymmetry or nipple discharge bilaterally.  Gastrointestinal:   Abdominal Examination:  Abdomen nontender to palpation, tone normal without rigidity or guarding, no masses present, umbilicus without lesions   Liver and Spleen:  No hepatomegaly present, liver nontender to palpation    Hernias:  No hernias present  Lymphatic: Lymph Nodes:  No other lymphadenopathy present  Skin:  General Inspection:  No rashes present, no lesions present, no areas of  discoloration  Neurologic:    Mental Status:  Oriented X3.  Normal strength and tone, sensory exam                grossly normal, mentation intact and speech normal.    Psychiatric:   Mentation appears normal and affect normal/bright.         Pelvic Exam:  External Genitalia:     Normal appearance for age, no discharge present, no tenderness present, no inflammatory lesions present, color normal  Vagina:     Normal vaginal vault without central or paravaginal defects, no discharge present, no inflammatory lesions present, no masses present  Bladder:     Nontender to palpation  Urethra:   Urethral Body:  Urethra palpation normal, urethra structural support normal   Urethral Meatus:  No erythema or lesions present  Cervix:     Appearance healthy, no lesions present, nontender to palpation, no bleeding present  Uterus:     Uterus: firm, normal sized and nontender, anteverted in position.   Adnexa:     No adnexal tenderness present, no adnexal masses present  Perineum:     Perineum within normal limits, no evidence of trauma, no rashes or skin lesions present  Anus:     Anus within normal limits, no hemorrhoids present  Inguinal Lymph Nodes:     No  lymphadenopathy present  Pubic Hair:     Normal pubic hair distribution for age  Genitalia and Groin:     No rashes present, no lesions present, no areas of discoloration, no masses present    COUNSELING:   Reviewed preventive health counseling, as reflected in patient instructions  Special attention given to:        Regular exercise       Healthy diet/nutrition    BMI: Body mass index is 18.65 kg/m .      ASSESSMENT:  38 year old female with satisfactory annual exam.    ICD-10-CM    1. Encounter for gynecological examination without abnormal finding  Z01.419       2. Encounter for lipid screening for cardiovascular disease  Z13.220 Lipid panel reflex to direct LDL Fasting    Z13.6       3. Screening for diabetes mellitus  Z13.1 Glucose whole blood          PLAN:  Patient Instructions   Return to clinic for screening Lipids and Fasting Blood sugar for biometric screening.  Form to be completed and returned.  Follow up with your primary care provider for your other medical problems.  Continue self breast exam.  Increase physical activity and exercise.  Lab results will be called to the patient.  Usual safety and preventative measures counseling done.  Last pap smear (2023) was normal and negative for the DNA of high risk HPV subtypes.  No pap was obtained this year.  This was discussed with the patient and she agrees with the plan.   Will monitor menses with completion of breast feeding.  If no periods within 3 months, will notify us.  Discussed intervention with progesterone in setting of oligomenorrhea at least every 3months if no periods.  Discussed OCPs vs progesterone course vs IUD/nexplanon, etc.      Natalee Farris MD

## 2024-04-24 ENCOUNTER — LAB (OUTPATIENT)
Dept: LAB | Facility: CLINIC | Age: 39
End: 2024-04-24
Payer: COMMERCIAL

## 2024-04-24 DIAGNOSIS — Z13.220 ENCOUNTER FOR LIPID SCREENING FOR CARDIOVASCULAR DISEASE: ICD-10-CM

## 2024-04-24 DIAGNOSIS — Z13.6 ENCOUNTER FOR LIPID SCREENING FOR CARDIOVASCULAR DISEASE: ICD-10-CM

## 2024-04-24 DIAGNOSIS — Z13.1 SCREENING FOR DIABETES MELLITUS: ICD-10-CM

## 2024-04-24 LAB
CHOLEST SERPL-MCNC: 230 MG/DL
FASTING STATUS PATIENT QL REPORTED: YES
GLUCOSE BLD-MCNC: 89 MG/DL (ref 60–99)
HDLC SERPL-MCNC: 113 MG/DL
LDLC SERPL CALC-MCNC: 110 MG/DL
NONHDLC SERPL-MCNC: 117 MG/DL
TRIGL SERPL-MCNC: 36 MG/DL

## 2024-04-24 PROCEDURE — 80061 LIPID PANEL: CPT

## 2024-04-24 PROCEDURE — 36415 COLL VENOUS BLD VENIPUNCTURE: CPT

## 2024-04-24 PROCEDURE — 82947 ASSAY GLUCOSE BLOOD QUANT: CPT

## 2024-04-25 NOTE — RESULT ENCOUNTER NOTE
Please inform of normal results --fasting labs look great!  Total cholesterol high but primarily because good cholesterol (HDL) is so good.  LDL normal at <130.  Fasting blood sugar normal

## 2024-05-19 NOTE — PROGRESS NOTES
HPI and Plan:     I had the pleasure seeing Ms. Grossman followup at the [facility] today.  She is a very pleasant 38-year-old female who I last saw in 2023.    Ms. Grossman was originally seen in 2019 after a hospitalization for paroxysmal atrial fibrillation in the context of active labor.  At that time, she converted spontaneously to normal sinus rhythm after receiving IV diltiazem.  An echocardiogram was essentially normal, and a Zio Patch demonstrated no evidence of recurrent atrial fibrillation.    I then saw her 18 weeks into her second pregnancy in 2022, at which point she was doing well overall from a cardiovascular standpoint without evidence of recurrent atrial fibrillation.  She was subsequently found to have recurrent atrial fibrillation at the time of labor and ultimately underwent a .  She converted spontaneously to normal sinus rhythm during her hospitalization and has fortunately done well since then.      In 2023 she developed atrial fibrillation while undergoing hemorrhoid banding procedure.  She converted spontaneously to normal sinus rhythm.  She she also contacted our office in 2023 with elevated heart rates at home.  It appears that her symptoms improved spontaneously without any specific intervention.    Most recently she was seen in the emergency department in 2024 after experiencing probable viral gastroenteritis that she contracted from her young son.  She was tachycardic but an EKG at the time demonstrated sinus tachycardia.  Since then she has actually done quite well and has not noted any recurrent atrial fibrillation episodes.    She is a very active individual who works out on a regular basis, performing both core exercises as well as regular running.  She denies any limitations in that regard.  She is contemplating running a half marathon sometime this year.    PHYSICAL EXAMINATION:  Dictated below.    Her most recent Zio  patch monitor was completed on 12/29/2022.  This demonstrated sinus rhythm with 4 runs of SVT, the longest being 5 beats.    Lipids on 4/24/2024 demonstrated total cholesterol 230, HDL of 113,  and triglycerides of 36.    An EKG on 1/16/2024 demonstrated sinus tachycardia.  Nonspecific ST T wave abnormalities were noted.    IMPRESSION:      1.  Paroxysmal atrial fibrillation.  This is most likely vagally mediated given the circumstances of her episodes although there may be a genetic component.    PLAN     is doing well overall from a cardiovascular standpoint, although she has had a few atrial fibrillation episodes since I last saw her as described above.    We discussed the treatment options for her atrial fibrillation episodes.  As we discussed previously, avoiding dehydration is probably one of the most important preventative steps.  We also discussed the role of pharmacotherapy, although I am reluctant to prescribe scheduled AV christa blocking agents given her borderline blood pressures at baseline.    At this point in time I recommended a repeat Zio patch monitor to assess the burden of her atrial fibrillation.  I will also order a cardiovascular MRI to rule out any scar patterns that may be suggestive of a genetic cardiomyopathy.      I have given her a as needed prescription for metoprolol to take if she has a prolonged episode of atrial fibrillation, but she will need to check her blood pressure prior to taking this agent.  If she has frequent symptomatic episodes of atrial fibrillation, we may need to obtain an opinion from our electrophysiology colleagues.    It was a pleasure seeing her in follow-up today.        CURRENT MEDICATIONS:  Current Outpatient Medications   Medication Sig Dispense Refill    norethindrone (MICRONOR) 0.35 MG tablet Take 1 tablet (0.35 mg) by mouth daily (Patient not taking: Reported on 4/16/2024) 90 tablet 3    Prenatal Multivit-Min-Fe-FA (PRENATAL VITAMINS PO)  Take 1 tablet by mouth      tretinoin (RETIN-A) 0.025 % external cream Use every night. 45 g 6       ALLERGIES     Allergies   Allergen Reactions    Seasonal Allergies        PAST MEDICAL HISTORY:  Past Medical History:   Diagnosis Date    Acute blood loss anemia 2019    Anxiety     Therapy--primarily due to last delivery and fear around this delivery    CP (cerebral palsy) (H)     History of blood transfusion     Infection due to  novel coronavirus 2022    cough    PAF (paroxysmal atrial fibrillation) (H)     2022 --both episodes during labor    Scoliosis 05/10/2012    Seasonal allergies     Status post  delivery 2019       PAST SURGICAL HISTORY:  Past Surgical History:   Procedure Laterality Date    BAND HEMORRHOIDECTOMY  2023     SECTION N/A 2019    Procedure:  SECTION;  Surgeon: Natalee Farris MD;  Location:  [facility]     SECTION N/A 2022    Procedure: REPEAT  SECTION;  Surgeon: Natalee Farris MD;  Location:  [facility]    CYSTOSCOPY N/A 2019    Procedure: CYSTOSCOPY;  Surgeon: Natalee Farris MD;  Location:  OR    CYSTOSCOPY, CYSTOGRAM, COMBINED N/A 2019    Procedure: Cystoscopy, cystogram, combined;  Surgeon: Pedro Luis Mireles MD;  Location:  OR    CYSTOSCOPY, RETROGRADES, INSERT STENT URETER(S), COMBINED  2019    Procedure: CYSTOSCOPY WITH BILATERAL RETROGRADES/ BILATERAL STENT PLACEMENT AND REMOVAL/ CYSTOGRAM/ BLADDER REPAIR;  Surgeon: Pedro Luis Mireles MD;  Location:  OR     REMOVE TONSILS/ADENOIDS,<11 Y/O      HC TOOTH EXTRACTION W/FORCEP      [place] Teeth    LAPAROTOMY EXPLORATORY N/A 2019    Procedure: EXPLORATORY LAPAROTOMY WITH REPEAT EXPLORATORY LAPAROTOMY WITH BLADDER REPAIR;  Surgeon: Natalee Farris MD;  Location:  OR    REPAIR BLADDER N/A 2019    Procedure: REPAIR, BLADDER;  Surgeon: Natalee Farris MD;  Location:  OR       FAMILY HISTORY:  Family  History   Problem Relation Age of Onset    Skin Cancer Mother     Hypertension Father     Diabetes Father     Transient ischemic attack Father     Neurologic Disorder Sister     Depression Brother         bipolar depression    Neurologic Disorder Brother         epliepsy    Psychotic Disorder Maternal Grandmother     Prostate Cancer Maternal Grandfather     Cancer - colorectal Maternal Grandfather     Diabetes Maternal Grandfather     Skin Cancer Paternal Grandmother     No Known Problems Paternal Grandfather     No Known Problems Other        SOCIAL HISTORY:  Social History     Socioeconomic History    Marital status:      Spouse name: Braeden    Number of children: 2   Occupational History    Occupation: Supply Chain     Employer: [employer]   Tobacco Use    Smoking status: Never    Smokeless tobacco: Never   Vaping Use    Vaping status: Never Used   Substance and Sexual Activity    Alcohol use: Yes     Comment: Less than once a month    Drug use: No    Sexual activity: Yes     Partners: Male     Birth control/protection: Male Surgical       Review of Systems:  Skin:          Eyes:         ENT:         Respiratory:          Cardiovascular:         Gastroenterology:        Genitourinary:         Musculoskeletal:         Neurologic:         Psychiatric:         Heme/Lymph/Imm:         Endocrine:           Physical Exam:  Vitals: There were no vitals taken for this visit.    Constitutional:  cooperative        Skin:  warm and dry to the touch          Head:  normocephalic        Eyes:           Lymph:      ENT:           Neck:  JVP normal        Respiratory:  clear to auscultation         Cardiac: regular rhythm                pulses full and equal                                        GI:  abdomen soft        Extremities and Muscular Skeletal:                 Neurological:  affect appropriate        Psych:  Alert and Oriented x 3        CC  Ishmael Finch MD  [id number]  [place]  [id number]

## 2024-05-23 ENCOUNTER — OFFICE VISIT (OUTPATIENT)
Dept: CARDIOLOGY | Facility: CLINIC | Age: 39
End: 2024-05-23
Payer: COMMERCIAL

## 2024-05-23 ENCOUNTER — LAB (OUTPATIENT)
Dept: LAB | Facility: CLINIC | Age: 39
End: 2024-05-23
Payer: COMMERCIAL

## 2024-05-23 VITALS
DIASTOLIC BLOOD PRESSURE: 60 MMHG | HEIGHT: 70 IN | WEIGHT: 131.6 LBS | HEART RATE: 89 BPM | SYSTOLIC BLOOD PRESSURE: 100 MMHG | BODY MASS INDEX: 18.84 KG/M2 | OXYGEN SATURATION: 97 %

## 2024-05-23 DIAGNOSIS — I48.0 PAF (PAROXYSMAL ATRIAL FIBRILLATION) (H): ICD-10-CM

## 2024-05-23 LAB — TSH SERPL DL<=0.005 MIU/L-ACNC: 2.65 UIU/ML (ref 0.3–4.2)

## 2024-05-23 PROCEDURE — 36415 COLL VENOUS BLD VENIPUNCTURE: CPT | Performed by: INTERNAL MEDICINE

## 2024-05-23 PROCEDURE — 84443 ASSAY THYROID STIM HORMONE: CPT | Performed by: INTERNAL MEDICINE

## 2024-05-23 PROCEDURE — 99214 OFFICE O/P EST MOD 30 MIN: CPT | Performed by: INTERNAL MEDICINE

## 2024-05-23 RX ORDER — METOPROLOL TARTRATE 25 MG/1
25 TABLET, FILM COATED ORAL PRN
Qty: 25 TABLET | Refills: 1 | Status: SHIPPED | OUTPATIENT
Start: 2024-05-23

## 2024-05-23 NOTE — LETTER
2024    Natalee Farris MD  [address] [facility] [address]  [facility] [address]    RE: Debbi LIMON Doering       Dear Colleague,     I had the pleasure of seeing Debbi Grossman in the [facility].  HPI and Plan:     I had the pleasure seeing Ms. Grossman followup at the [facility] today.  She is a very pleasant 38-year-old female who I last saw in 2023.    Ms. Grossman was originally seen in 2019 after a hospitalization for paroxysmal atrial fibrillation in the context of active labor.  At that time, she converted spontaneously to normal sinus rhythm after receiving IV diltiazem.  An echocardiogram was essentially normal, and a Zio Patch demonstrated no evidence of recurrent atrial fibrillation.    I then saw her 18 weeks into her second pregnancy in 2022, at which point she was doing well overall from a cardiovascular standpoint without evidence of recurrent atrial fibrillation.  She was subsequently found to have recurrent atrial fibrillation at the time of labor and ultimately underwent a .  She converted spontaneously to normal sinus rhythm during her hospitalization and has fortunately done well since then.      In 2023 she developed atrial fibrillation while undergoing hemorrhoid banding procedure.  She converted spontaneously to normal sinus rhythm.  She she also contacted our office in 2023 with elevated heart rates at home.  It appears that her symptoms improved spontaneously without any specific intervention.    Most recently she was seen in the emergency department in 2024 after experiencing probable viral gastroenteritis that she contracted from her young son.  She was tachycardic but an EKG at the time demonstrated sinus tachycardia.  Since then she has actually done quite well and has not noted any recurrent atrial fibrillation episodes.    She is a very active individual who works out on a regular basis, performing both core  exercises as well as regular running.  She denies any limitations in that regard.  She is contemplating running a half marathon sometime this year.    PHYSICAL EXAMINATION:  Dictated below.    Her most recent Zio patch monitor was completed on 12/29/2022.  This demonstrated sinus rhythm with 4 runs of SVT, the longest being 5 beats.    Lipids on 4/24/2024 demonstrated total cholesterol 230, HDL of 113,  and triglycerides of 36.    An EKG on 1/16/2024 demonstrated sinus tachycardia.  Nonspecific ST T wave abnormalities were noted.    IMPRESSION:      1.  Paroxysmal atrial fibrillation.  This is most likely vagally mediated given the circumstances of her episodes although there may be a genetic component.    PLAN     is doing well overall from a cardiovascular standpoint, although she has had a few atrial fibrillation episodes since I last saw her as described above.    We discussed the treatment options for her atrial fibrillation episodes.  As we discussed previously, avoiding dehydration is probably one of the most important preventative steps.  We also discussed the role of pharmacotherapy, although I am reluctant to prescribe scheduled AV christa blocking agents given her borderline blood pressures at baseline.    At this point in time I recommended a repeat Zio patch monitor to assess the burden of her atrial fibrillation.  I will also order a cardiovascular MRI to rule out any scar patterns that may be suggestive of a genetic cardiomyopathy.      I have given her a as needed prescription for metoprolol to take if she has a prolonged episode of atrial fibrillation, but she will need to check her blood pressure prior to taking this agent.  If she has frequent symptomatic episodes of atrial fibrillation, we may need to obtain an opinion from our electrophysiology colleagues.    It was a pleasure seeing her in follow-up today.        CURRENT MEDICATIONS:  Current Outpatient Medications   Medication  Sig Dispense Refill    norethindrone (MICRONOR) 0.35 MG tablet Take 1 tablet (0.35 mg) by mouth daily (Patient not taking: Reported on 2024) 90 tablet 3    Prenatal Multivit-Min-Fe-FA (PRENATAL VITAMINS PO) Take 1 tablet by mouth      tretinoin (RETIN-A) 0.025 % external cream Use every night. 45 g 6       ALLERGIES     Allergies   Allergen Reactions    Seasonal Allergies        PAST MEDICAL HISTORY:  Past Medical History:   Diagnosis Date    Acute blood loss anemia 2019    Anxiety     Therapy--primarily due to last delivery and fear around this delivery    CP (cerebral palsy) (H)     History of blood transfusion     Infection due to  novel coronavirus 2022    cough    PAF (paroxysmal atrial fibrillation) (H)     ,  --both episodes during labor    Scoliosis 05/10/2012    Seasonal allergies     Status post  delivery 2019       PAST SURGICAL HISTORY:  Past Surgical History:   Procedure Laterality Date    BAND HEMORRHOIDECTOMY  2023     SECTION N/A 2019    Procedure:  SECTION;  Surgeon: Natalee Farris MD;  Location:  [facility]     SECTION N/A 2022    Procedure: REPEAT  SECTION;  Surgeon: Natalee Farris MD;  Location:  [facility]    CYSTOSCOPY N/A 2019    Procedure: CYSTOSCOPY;  Surgeon: Natalee Farris MD;  Location:  OR    CYSTOSCOPY, CYSTOGRAM, COMBINED N/A 2019    Procedure: Cystoscopy, cystogram, combined;  Surgeon: Pedro Luis Mireles MD;  Location:  OR    CYSTOSCOPY, RETROGRADES, INSERT STENT URETER(S), COMBINED  2019    Procedure: CYSTOSCOPY WITH BILATERAL RETROGRADES/ BILATERAL STENT PLACEMENT AND REMOVAL/ CYSTOGRAM/ BLADDER REPAIR;  Surgeon: Pedro Luis Mireles MD;  Location:  OR    HC REMOVE TONSILS/ADENOIDS,<13 Y/O      HC TOOTH EXTRACTION W/FORCEP      [facility]    LAPAROTOMY EXPLORATORY N/A 2019    Procedure: EXPLORATORY LAPAROTOMY WITH REPEAT EXPLORATORY LAPAROTOMY WITH  BLADDER REPAIR;  Surgeon: Natalee Farris MD;  Location: [facility] OR    REPAIR BLADDER N/A 08/23/2019    Procedure: REPAIR, BLADDER;  Surgeon: Natalee Farris MD;  Location: [facility] OR       FAMILY HISTORY:  Family History   Problem Relation Age of Onset    Skin Cancer Mother     Hypertension Father     Diabetes Father     Transient ischemic attack Father     Neurologic Disorder Sister     Depression Brother         bipolar depression    Neurologic Disorder Brother         epliepsy    Psychotic Disorder Maternal Grandmother     Prostate Cancer Maternal Grandfather     Cancer - colorectal Maternal Grandfather     Diabetes Maternal Grandfather     Skin Cancer Paternal Grandmother     No Known Problems Paternal Grandfather     No Known Problems Other        SOCIAL HISTORY:  Social History     Socioeconomic History    Marital status:      Spouse name: Braeden    Number of children: 2   Occupational History    Occupation: Supply Chain     Employer: [employer]   Tobacco Use    Smoking status: Never    Smokeless tobacco: Never   Vaping Use    Vaping status: Never Used   Substance and Sexual Activity    Alcohol use: Yes     Comment: Less than once a month    Drug use: No    Sexual activity: Yes     Partners: Male     Birth control/protection: Male Surgical       Review of Systems:  Skin:          Eyes:         ENT:         Respiratory:          Cardiovascular:         Gastroenterology:        Genitourinary:         Musculoskeletal:         Neurologic:         Psychiatric:         Heme/Lymph/Imm:         Endocrine:           Physical Exam:  Vitals: There were no vitals taken for this visit.    Constitutional:  cooperative        Skin:  warm and dry to the touch          Head:  normocephalic        Eyes:           Lymph:      ENT:           Neck:  JVP normal        Respiratory:  clear to auscultation         Cardiac: regular rhythm                pulses full and equal                                        GI:  abdomen soft         Extremities and Muscular Skeletal:                 Neurological:  affect appropriate        Psych:  Alert and Oriented x 3        CC  Ishmael Finch MD  [address]  [place] [address]      Thank you for allowing me to participate in the care of your patient.      Sincerely,     Ishmael Finch MD     [facility]

## 2024-05-30 ENCOUNTER — ORDERS ONLY (AUTO-RELEASED) (OUTPATIENT)
Dept: CARDIOLOGY | Facility: CLINIC | Age: 39
End: 2024-05-30
Payer: COMMERCIAL

## 2024-05-30 DIAGNOSIS — I48.0 PAF (PAROXYSMAL ATRIAL FIBRILLATION) (H): ICD-10-CM

## 2024-06-21 PROCEDURE — 93248 EXT ECG>7D<15D REV&INTERPJ: CPT | Performed by: INTERNAL MEDICINE

## 2024-06-24 ENCOUNTER — TELEPHONE (OUTPATIENT)
Dept: CARDIOLOGY | Facility: CLINIC | Age: 39
End: 2024-06-24
Payer: COMMERCIAL

## 2024-06-24 NOTE — TELEPHONE ENCOUNTER
Felipe routed to Dr Finch to review, ordered to reassess afib burden, hx PAF. Cardiac MRI is scheduled for 6/26/24, ordered to assess for any scar patterns indicating a genetic CM.       11-day cardiac monitor.  Baseline SR range 50-153bpm, avg 81bpm  Rare PVCs PVCs.   No atrial fibrillation.  Several patient activations with underlying sinus rhythm, though the majority appear accidental.

## 2024-06-26 ENCOUNTER — TELEPHONE (OUTPATIENT)
Dept: CARDIOLOGY | Facility: CLINIC | Age: 39
End: 2024-06-26

## 2024-06-26 ENCOUNTER — HOSPITAL ENCOUNTER (OUTPATIENT)
Dept: CARDIOLOGY | Facility: CLINIC | Age: 39
Discharge: HOME | End: 2024-06-26
Attending: INTERNAL MEDICINE | Admitting: INTERNAL MEDICINE
Payer: COMMERCIAL

## 2024-06-26 VITALS — SYSTOLIC BLOOD PRESSURE: 99 MMHG | HEART RATE: 98 BPM | DIASTOLIC BLOOD PRESSURE: 69 MMHG

## 2024-06-26 DIAGNOSIS — I48.0 PAF (PAROXYSMAL ATRIAL FIBRILLATION) (H): Primary | ICD-10-CM

## 2024-06-26 DIAGNOSIS — I48.0 PAF (PAROXYSMAL ATRIAL FIBRILLATION) (H): ICD-10-CM

## 2024-06-26 PROCEDURE — 75561 CARDIAC MRI FOR MORPH W/DYE: CPT | Mod: 26 | Performed by: INTERNAL MEDICINE

## 2024-06-26 PROCEDURE — A9585 GADOBUTROL INJECTION: HCPCS | Performed by: INTERNAL MEDICINE

## 2024-06-26 PROCEDURE — 255N000002 HC RX 255 OP 636: Performed by: INTERNAL MEDICINE

## 2024-06-26 PROCEDURE — 75561 CARDIAC MRI FOR MORPH W/DYE: CPT

## 2024-06-26 RX ORDER — SODIUM CHLORIDE 9 MG/ML
INJECTION, SOLUTION INTRAVENOUS CONTINUOUS
Status: ACTIVE | OUTPATIENT
Start: 2024-06-26 | End: 2024-06-26

## 2024-06-26 RX ORDER — GADOBUTROL 604.72 MG/ML
7.5 INJECTION INTRAVENOUS ONCE
Status: COMPLETED | OUTPATIENT
Start: 2024-06-26 | End: 2024-06-26

## 2024-06-26 RX ORDER — LIDOCAINE 40 MG/G
CREAM TOPICAL
OUTPATIENT
Start: 2024-06-26

## 2024-06-26 RX ORDER — NITROGLYCERIN 0.4 MG/1
0.4 TABLET SUBLINGUAL EVERY 5 MIN PRN
Status: ACTIVE | OUTPATIENT
Start: 2024-06-26 | End: 2024-06-26

## 2024-06-26 RX ORDER — LORAZEPAM 0.5 MG/1
0.5 TABLET ORAL EVERY 10 MIN PRN
Status: DISCONTINUED | OUTPATIENT
Start: 2024-06-26 | End: 2024-06-27 | Stop reason: HOSPADM

## 2024-06-26 RX ORDER — DIAZEPAM 5 MG
5 TABLET ORAL EVERY 30 MIN PRN
Status: DISCONTINUED | OUTPATIENT
Start: 2024-06-26 | End: 2024-06-27 | Stop reason: HOSPADM

## 2024-06-26 RX ADMIN — GADOBUTROL 7.5 ML: 604.72 INJECTION INTRAVENOUS at 09:37

## 2024-06-26 NOTE — TELEPHONE ENCOUNTER
Please see results from cardiac MRI ordered to rule out any scar patterns that may be suggestive of a genetic cardiomyopathy.     Clinical history: Paroxysmal atrial fibrillation, assess for structural heart disease.   Comparison CMR: None     1. The LV is normal in cavity size and wall thickness. The global systolic function is low normal. The LVEF  is 55%. There are no regional wall motion abnormalities.     2. The RV is normal in cavity size. The global systolic function is normal. The RVEF is 62%.      3. Normal bi-atrial size.      4. There is no significant valvular disease.      5. Late gadolinium enhancement imaging shows no MI, fibrosis or infiltrative disease.      CONCLUSIONS: Low normal left ventricular size and systolic function. No late enhancement to suggest prior infarct, inflammation or infiltration.

## 2024-07-22 ENCOUNTER — OFFICE VISIT (OUTPATIENT)
Dept: OTOLARYNGOLOGY | Facility: CLINIC | Age: 39
End: 2024-07-22
Payer: COMMERCIAL

## 2024-07-22 VITALS
DIASTOLIC BLOOD PRESSURE: 68 MMHG | WEIGHT: 132 LBS | HEART RATE: 94 BPM | OXYGEN SATURATION: 98 % | HEIGHT: 70 IN | SYSTOLIC BLOOD PRESSURE: 100 MMHG | BODY MASS INDEX: 18.9 KG/M2

## 2024-07-22 DIAGNOSIS — H61.22 EXCESSIVE CERUMEN IN EAR CANAL, LEFT: ICD-10-CM

## 2024-07-22 DIAGNOSIS — H61.21 IMPACTED CERUMEN OF RIGHT EAR: Primary | ICD-10-CM

## 2024-07-22 DIAGNOSIS — H81.10 BENIGN PAROXYSMAL POSITIONAL VERTIGO, UNSPECIFIED LATERALITY: ICD-10-CM

## 2024-07-22 PROCEDURE — 69210 REMOVE IMPACTED EAR WAX UNI: CPT | Mod: RT | Performed by: REGISTERED NURSE

## 2024-07-22 PROCEDURE — 99203 OFFICE O/P NEW LOW 30 MIN: CPT | Mod: 25 | Performed by: REGISTERED NURSE

## 2024-07-22 ASSESSMENT — PAIN SCALES - GENERAL: PAINLEVEL: NO PAIN (0)

## 2024-07-22 NOTE — LETTER
7/22/2024       RE: Debbi Grossman  [address]  [facility] [address]     Dear Colleague,    Thank you for referring your patient, Debbi Grossman, to the [facility] [place] at [facility]. Please see a copy of my visit note below.      Otolaryngology Clinic  July 22, 2024    HPI:  Debbi Grossman is here for cerumen impaction removal. History of excessive cerumen bilaterally requiring ear irrigation as needed. Difficulty hearing out of the left ear. Feels plugged. Patient denies any otalgia, otorrhea, history of frequent ear infections, or ear surgeries.     Also reports 6 month history of dizziness when lying down and turning to the left. Will have seconds of dizziness. No change in hearing at that time.      Otologic microscope exam:    Biocular Microscopy exam is needed due to deep impaction of cerumen of bilateral ears, requiring direct visualization for use of cleaning instruments.    Right ear was examined under the microscope.  Cerumen impaction noted. It was cleaned with right angle hook and suction. Once cleaned, TM visualized under microscope. Normal appearing TM, nicely aerated middle ear space.     Left ear was also examined under the microscope.  Cerumen in canal noted. It was cleaned with suction. Once cleaned, TM visualized under microscope. Normal appearing TM, nicely aerated middle ear space.     Assessment and Plan:  1. Impacted cerumen of right ear  Ear cleaned under microscopy today. Healthy ear exam after cleaning. Recommend returning to clinic in 6 months for repeat cleaning.    2. Excessive cerumen in ear canal, left  Non-occluding cerumen cleaned under microscopy. Healthy exam after cleaning. Recommend audiogram to check hearing due to feeling of difficulty hearing out of the left ear.    3. Benign paroxysmal positional vertigo, unspecified laterality  Vertigo symptoms consistent with BPPV. PT  referral placed for evaluation.     Twyla Alarcon DNP, APRN, CNP  Otolaryngology  Head & Neck Surgery  [phone]    30 minutes spent on the date of the encounter doing chart review, history and exam, documentation and further activities per the note excluding time performing ear cleaning under microscopy.      Again, thank you for allowing me to participate in the care of your patient.      Sincerely,    Tiki Alarcon, NP

## 2024-07-22 NOTE — NURSING NOTE
"Chief Complaint   Patient presents with    RECHECK   Blood pressure 100/68, pulse 94, height 1.765 m (5' 9.5\"), weight 59.9 kg (132 lb), SpO2 98%, currently breastfeeding. Micah Paulino, EMT    "

## 2025-01-16 DIAGNOSIS — H81.10 BENIGN PAROXYSMAL POSITIONAL VERTIGO, UNSPECIFIED LATERALITY: Primary | ICD-10-CM

## 2025-01-22 ENCOUNTER — OFFICE VISIT (OUTPATIENT)
Dept: OTOLARYNGOLOGY | Facility: CLINIC | Age: 40
End: 2025-01-22
Payer: COMMERCIAL

## 2025-01-22 ENCOUNTER — OFFICE VISIT (OUTPATIENT)
Dept: AUDIOLOGY | Facility: CLINIC | Age: 40
End: 2025-01-22
Payer: COMMERCIAL

## 2025-01-22 VITALS
TEMPERATURE: 98.5 F | HEART RATE: 66 BPM | WEIGHT: 133 LBS | SYSTOLIC BLOOD PRESSURE: 112 MMHG | HEIGHT: 69 IN | DIASTOLIC BLOOD PRESSURE: 70 MMHG | OXYGEN SATURATION: 96 % | BODY MASS INDEX: 19.7 KG/M2

## 2025-01-22 DIAGNOSIS — H81.10 BENIGN PAROXYSMAL POSITIONAL VERTIGO, UNSPECIFIED LATERALITY: ICD-10-CM

## 2025-01-22 DIAGNOSIS — H61.23 EXCESSIVE CERUMEN IN BOTH EAR CANALS: Primary | ICD-10-CM

## 2025-01-22 DIAGNOSIS — H93.8X3 EAR FULLNESS, BILATERAL: ICD-10-CM

## 2025-01-22 DIAGNOSIS — H61.20 IMPACTED CERUMEN, UNSPECIFIED LATERALITY: Primary | ICD-10-CM

## 2025-01-22 ASSESSMENT — PAIN SCALES - GENERAL: PAINLEVEL_OUTOF10: NO PAIN (0)

## 2025-01-22 NOTE — NURSING NOTE
"Chief Complaint   Patient presents with    RECHECK     Ear cleaning     .Blood pressure 112/70, pulse (!) 66, temperature 98.5  F (36.9  C), height 1.753 m (5' 9\"), weight 60.3 kg (133 lb), SpO2 96%, currently breastfeeding.    Rajesh Johnson LPN    "

## 2025-01-22 NOTE — PROGRESS NOTES
AUDIOLOGY REPORT    SUMMARY: Audiology visit completed. See audiogram for results.      RECOMMENDATIONS: Follow-up with ENT.    Karen Villarreal.  Licensed Audiologist  [place] # [id number]

## 2025-01-22 NOTE — LETTER
1/22/2025       RE: Debbi Grossman  [address]  [facility] [address]     Dear Colleague,    Thank you for referring your patient, Debbi Grossman, to the [facility] [place] at [facility]. Please see a copy of my visit note below.      Otolaryngology Clinic  January 22, 2025    HPI:  Debbi Grossman is here for an ear cleaning prior to audiogram. History of excessive cerumen bilaterally. Last seen in clinic 7/22/24 for an ear cleaning. Having dizziness with moving head to the left at that visit. Recommended PT. Audiogram recommended for left ear plugging.     Today, patient reports that dizziness resolved after last visit. Left ear plugging also resolved but continues to have some intermittent ear plugging and difficulty hearing child.     Otologic microscope exam:    Biocular Microscopy exam is needed due to deep impaction of cerumen of bilateral ears, requiring direct visualization for use of cleaning instruments.    Right ear was examined under the microscope. Narrow canal. Dry cerumen at entrance of canal. It was cleaned with suction. Once cleaned, TM visualized under microscope. Normal appearing TM, nicely aerated middle ear space.     Left ear was also examined under the microscope.  Narrow canal. Dry cerumen at entrance of canal. It was cleaned with suction. Once cleaned, TM visualized under microscope. Normal appearing TM, nicely aerated middle ear space.     Assessment and Plan:  1. Excessive cerumen in both ear canals (Primary)  Patient presents with excessive cerumen bilaterally.  The patient's ears are cleaned today.  Healthy exam after cleaning. May proceed with audiogram as scheduled. Return in 6-8 months for cleaning.     Will chart check audiogram and send a message with follow up recommendations if results of audiogram show any abnormal findings including asymmetric hearing loss or word recognition score,  conductive hearing loss, or abnormal tympanograms.     Twyla Alarcon DNP, APRN, CNP  Otolaryngology  Head & Neck Surgery  [phone]    10 minutes spent on the date of the encounter doing chart review, history and exam, documentation and further activities per the note excluding time performing ear cleaning under microscopy.      Again, thank you for allowing me to participate in the care of your patient.      Sincerely,    Tiki Alarcon, NP

## 2025-01-22 NOTE — PROGRESS NOTES
Otolaryngology Clinic  January 22, 2025    HPI:  Debbi Grossman is here for an ear cleaning prior to audiogram. History of excessive cerumen bilaterally. Last seen in clinic 7/22/24 for an ear cleaning. Having dizziness with moving head to the left at that visit. Recommended PT. Audiogram recommended for left ear plugging.     Today, patient reports that dizziness resolved after last visit. Left ear plugging also resolved but continues to have some intermittent ear plugging and difficulty hearing child.     Otologic microscope exam:    Biocular Microscopy exam is needed due to deep impaction of cerumen of bilateral ears, requiring direct visualization for use of cleaning instruments.    Right ear was examined under the microscope. Narrow canal. Dry cerumen at entrance of canal. It was cleaned with suction. Once cleaned, TM visualized under microscope. Normal appearing TM, nicely aerated middle ear space.     Left ear was also examined under the microscope.  Narrow canal. Dry cerumen at entrance of canal. It was cleaned with suction. Once cleaned, TM visualized under microscope. Normal appearing TM, nicely aerated middle ear space.     Assessment and Plan:  1. Excessive cerumen in both ear canals (Primary)  Patient presents with excessive cerumen bilaterally.  The patient's ears are cleaned today.  Healthy exam after cleaning. May proceed with audiogram as scheduled. Return in 6-8 months for cleaning.     Will chart check audiogram and send a message with follow up recommendations if results of audiogram show any abnormal findings including asymmetric hearing loss or word recognition score, conductive hearing loss, or abnormal tympanograms.     Twyla Alarcon DNP, APRN, CNP  Otolaryngology  Head & Neck Surgery  [phone]    10 minutes spent on the date of the encounter doing chart review, history and exam, documentation and further activities per the note excluding time performing ear cleaning under  microscopy.

## 2025-02-12 ENCOUNTER — MYC MEDICAL ADVICE (OUTPATIENT)
Dept: CARDIOLOGY | Facility: CLINIC | Age: 40
End: 2025-02-12
Payer: COMMERCIAL

## 2025-02-12 DIAGNOSIS — I48.0 PAF (PAROXYSMAL ATRIAL FIBRILLATION) (H): Primary | ICD-10-CM

## 2025-02-12 NOTE — TELEPHONE ENCOUNTER
She can use as needed metoprolol as long as she is checking her blood pressure and systolics are greater than 100 mmHg.  She should come in and get an EKG if she is still symptomatic.  Thanks.

## 2025-02-12 NOTE — TELEPHONE ENCOUNTER
Patient message routed to Dr. Finch for review.  see patient's message regarding elevated HR with febrile illness. History of Afib with pregnancy, delivery and previous illness.

## 2025-02-12 NOTE — TELEPHONE ENCOUNTER
"Patient called to inform of Dr. Finch's recommendations. Symptoms reviewed and patient reports she is now feeling better, HR down to 100 and no longer experiencing her \"heart flutter.\" Reports with previous episode this afternoon HR got up to 130's and she felt miserable at that time. Denies any shortness of breath or chest pain.    Patient educated on indication, use and effects of metoprolol. Instructed to check BP prior to taking medication and only use of SBP is >100. ED precautions also reviewed in great detail with patient. Patient appreciates call and verbalizes understanding.    Patient's  is currently going to the store to  a home blood pressure cuff, tylenol and fluid with electrolytes. Patient reports she will check her BP when he returns and use metoprolol as needed. Also reports she may call nursing line with any further questions or concerns.    Routed back to nursing team to follow up with patient later in day or tomorrow morning.   "

## 2025-02-13 NOTE — TELEPHONE ENCOUNTER
Patient called and informed that Dr. Finch has reviewed results of ECG and his interpretation and recommendations, including echocardiogram in 1-2 weeks. Patient instructed on importance of managing fevers by alternating both tylenol and ibuprofen and instructed on max doses to be taken in 24 hours. Instructed again on importance of adequate hydration. Instructed to only use PRN metoprolol if SBP is >100 as this medication can drop her blood pressure, particularly if dehydrated. All questions answered and patient instructed to follow up with PCP regarding illness. Staffing messaged to please reach out to schedule patient for echocardiogram in 1-2 weeks.

## 2025-02-13 NOTE — TELEPHONE ENCOUNTER
EKG reviewed.  She has sinus tachycardia which is probably related to her underlying febrile illness.  I would recommend continuing the workup with her primary care physician.  She should not take metoprolol if her blood pressure is on the lower side because this may be reactive sinus tachycardia.  Thanks.

## 2025-02-13 NOTE — TELEPHONE ENCOUNTER
Patient called again this morning at 1000 to assess how she is doing and if she has used metoprolol. Patient reports he BP was low yesterday, with systolic BP of 98 and she did not end up taking the metoprolol. Reports HR has been running around 100-110 at rest (baseline 70's). Reports she woke with a high fever overnight and HR was 126 and she was symptomatic. Reports taking tylenol and going back to sleep. HR this morning is 110 at time of call and patient reports her smart watch is reporting inconclusive ECG. Patient reports she is not currently symptomatic.     Next available appointment for ECG today in [place] is 1115, patient has been scheduled. Of note - patient was tested for influenza, covid and strep which were all negative. Patient instructed to wear mask to appointment and bring metoprolol with. Information added to apt note.    UPDATE 3540: Dr. Finch updated regarding ECG results.

## 2025-02-15 ENCOUNTER — HOSPITAL ENCOUNTER (EMERGENCY)
Facility: CLINIC | Age: 40
Discharge: HOME | End: 2025-02-15
Admitting: EMERGENCY MEDICINE
Payer: COMMERCIAL

## 2025-02-15 VITALS
DIASTOLIC BLOOD PRESSURE: 78 MMHG | HEART RATE: 112 BPM | SYSTOLIC BLOOD PRESSURE: 105 MMHG | OXYGEN SATURATION: 94 % | RESPIRATION RATE: 16 BRPM | TEMPERATURE: 98.9 F | BODY MASS INDEX: 19.2 KG/M2 | WEIGHT: 130 LBS

## 2025-02-15 LAB
FLUAV RNA SPEC QL NAA+PROBE: NEGATIVE
FLUBV RNA RESP QL NAA+PROBE: NEGATIVE
RSV RNA SPEC NAA+PROBE: NEGATIVE
S PYO DNA THROAT QL NAA+PROBE: NOT DETECTED
SARS-COV-2 RNA RESP QL NAA+PROBE: NEGATIVE

## 2025-02-15 PROCEDURE — 87651 STREP A DNA AMP PROBE: CPT | Performed by: EMERGENCY MEDICINE

## 2025-02-15 PROCEDURE — 99281 EMR DPT VST MAYX REQ PHY/QHP: CPT

## 2025-02-15 PROCEDURE — 87637 SARSCOV2&INF A&B&RSV AMP PRB: CPT | Performed by: EMERGENCY MEDICINE

## 2025-02-15 ASSESSMENT — COLUMBIA-SUICIDE SEVERITY RATING SCALE - C-SSRS
2. HAVE YOU ACTUALLY HAD ANY THOUGHTS OF KILLING YOURSELF IN THE PAST MONTH?: NO
1. IN THE PAST MONTH, HAVE YOU WISHED YOU WERE DEAD OR WISHED YOU COULD GO TO SLEEP AND NOT WAKE UP?: NO
6. HAVE YOU EVER DONE ANYTHING, STARTED TO DO ANYTHING, OR PREPARED TO DO ANYTHING TO END YOUR LIFE?: NO

## 2025-02-15 ASSESSMENT — ACTIVITIES OF DAILY LIVING (ADL): ADLS_ACUITY_SCORE: 50

## 2025-02-15 NOTE — ED TRIAGE NOTES
Pt c/o fever and cough 4 days. Pt states her fever tonight was around 104.1. Pt states last dose of IBU's around 2115 yesterday. Pt states son recently had strep.      Triage Assessment (Adult)       Row Name 02/15/25 0028          Triage Assessment    Airway WDL WDL        Respiratory WDL    Respiratory WDL X        Skin Circulation/Temperature WDL    Skin Circulation/Temperature WDL WDL        Cardiac WDL    Cardiac WDL X     Cardiac Rhythm ST        Peripheral/Neurovascular WDL    Peripheral Neurovascular WDL WDL        Cognitive/Neuro/Behavioral WDL    Cognitive/Neuro/Behavioral WDL WDL

## 2025-02-16 ENCOUNTER — MYC MEDICAL ADVICE (OUTPATIENT)
Dept: OBGYN | Facility: CLINIC | Age: 40
End: 2025-02-16
Payer: COMMERCIAL

## 2025-02-17 ENCOUNTER — OFFICE VISIT (OUTPATIENT)
Dept: FAMILY MEDICINE | Facility: CLINIC | Age: 40
End: 2025-02-17
Payer: COMMERCIAL

## 2025-02-17 VITALS
BODY MASS INDEX: 19.26 KG/M2 | TEMPERATURE: 98.2 F | HEART RATE: 102 BPM | HEIGHT: 69 IN | SYSTOLIC BLOOD PRESSURE: 110 MMHG | DIASTOLIC BLOOD PRESSURE: 71 MMHG | WEIGHT: 130 LBS | RESPIRATION RATE: 16 BRPM | OXYGEN SATURATION: 98 %

## 2025-02-17 DIAGNOSIS — J11.1 INFLUENZA-LIKE ILLNESS: Primary | ICD-10-CM

## 2025-02-17 LAB

## 2025-02-17 PROCEDURE — 87581 M.PNEUMON DNA AMP PROBE: CPT | Performed by: NURSE PRACTITIONER

## 2025-02-17 PROCEDURE — G2211 COMPLEX E/M VISIT ADD ON: HCPCS | Performed by: NURSE PRACTITIONER

## 2025-02-17 PROCEDURE — 99203 OFFICE O/P NEW LOW 30 MIN: CPT | Performed by: NURSE PRACTITIONER

## 2025-02-17 PROCEDURE — 87633 RESP VIRUS 12-25 TARGETS: CPT | Performed by: NURSE PRACTITIONER

## 2025-02-17 PROCEDURE — 87486 CHLMYD PNEUM DNA AMP PROBE: CPT | Performed by: NURSE PRACTITIONER

## 2025-02-17 RX ORDER — AZITHROMYCIN 250 MG/1
TABLET, FILM COATED ORAL
Qty: 6 TABLET | Refills: 0 | Status: SHIPPED | OUTPATIENT
Start: 2025-02-17 | End: 2025-02-22

## 2025-02-17 ASSESSMENT — PAIN SCALES - GENERAL: PAINLEVEL_OUTOF10: NO PAIN (0)

## 2025-02-17 NOTE — PATIENT INSTRUCTIONS
Florastor probiotic for 1 month after the antibiotic   This is only saccaromyces boulardii   You can also get a blend of lacto and bifido       Xlear nasal spray (Regular with saline or rescue). This has grapefruit seed extract that is a natural antimicrobial. This helps fight off any sort of infection in the nose. It also has xylitol that lines the mucus membranes so that viruses and bacteria can stop and take hold.    Used it every few hours initially while you are sick. Then go to twice a day  It can also be used prophylactically. If you are getting sick or around anyone that sick you can use it twice a day.

## 2025-02-17 NOTE — PROGRESS NOTES
"  Assessment & Plan     Influenza-like illness  Suspected pneumonia based on exam and prolonged high fevers with neg flu/covid/rsv. Will complete resp panel as I have concern for mycoplasma. Will treat with azithromycin. She is breastfeeding her 1yo. This is likely low risk considering just once daily breastfeeding. Add xlear nasal spray and any OTC prn. Follow-up if worsening   - Respiratory Panel PCR  - azithromycin (ZITHROMAX) 250 MG tablet; Take 2 tablets (500 mg) by mouth daily for 1 day, THEN 1 tablet (250 mg) daily for 4 days.          Subjective   Debbi is a 39 year old, presenting for the following health issues:  Fever    History of Present Illness       Reason for visit:  Fever for 6days not responding to fever suppressants and stays around 101 but sometimes was as high as 104  Symptom onset:  3-7 days ago  Symptoms include:  Fever cough sore throat high heart rate rapid breathing  Symptom intensity:  Severe  Symptom progression:  Staying the same  Had these symptoms before:  No  What makes it better:  Standing outside to get the fever down   She is taking medications regularly.     Started last tues 6 days ago  Has had a fever since   Had an EKG last week and echo planned for next week   Cough, mild sore throat, shallow breathing, headaches   Body aches  Son had strep recently       Review of Systems  Detailed as above         Objective    /71 (BP Location: Right arm, Patient Position: Sitting, Cuff Size: Adult Regular)   Pulse 102   Temp 98.2  F (36.8  C) (Temporal)   Resp 16   Ht 1.753 m (5' 9\")   Wt 59 kg (130 lb)   LMP 02/06/2025 (Approximate)   SpO2 98%   Breastfeeding Yes   BMI 19.20 kg/m    There is no height or weight on file to calculate BMI.  Physical Exam  Constitutional:       Appearance: Normal appearance.   HENT:      Head: Normocephalic.      Right Ear: Tympanic membrane, ear canal and external ear normal.      Left Ear: Tympanic membrane, ear canal and external ear normal. "      Mouth/Throat:      Pharynx: No oropharyngeal exudate.   Eyes:      Conjunctiva/sclera: Conjunctivae normal.   Cardiovascular:      Rate and Rhythm: Regular rhythm. Tachycardia present.      Heart sounds: Normal heart sounds. No murmur heard.  Pulmonary:      Effort: Pulmonary effort is normal. No respiratory distress.      Breath sounds: Rhonchi (Left base) present.      Comments: Moist cough  Musculoskeletal:      Cervical back: Normal range of motion.   Lymphadenopathy:      Cervical: No cervical adenopathy.   Skin:     General: Skin is warm and dry.   Neurological:      Mental Status: She is alert.   Psychiatric:         Mood and Affect: Mood normal.                    Signed Electronically by: ASHLEY Del Valle CNP

## 2025-02-24 ENCOUNTER — TELEPHONE (OUTPATIENT)
Dept: CARDIOLOGY | Facility: CLINIC | Age: 40
End: 2025-02-24

## 2025-02-24 ENCOUNTER — HOSPITAL ENCOUNTER (OUTPATIENT)
Dept: CARDIOLOGY | Facility: CLINIC | Age: 40
Discharge: HOME | End: 2025-02-24
Attending: INTERNAL MEDICINE | Admitting: INTERNAL MEDICINE
Payer: COMMERCIAL

## 2025-02-24 DIAGNOSIS — I48.0 PAF (PAROXYSMAL ATRIAL FIBRILLATION) (H): ICD-10-CM

## 2025-02-24 LAB — LVEF ECHO: NORMAL

## 2025-02-24 PROCEDURE — 93306 TTE W/DOPPLER COMPLETE: CPT | Mod: 26 | Performed by: INTERNAL MEDICINE

## 2025-02-24 PROCEDURE — 93306 TTE W/DOPPLER COMPLETE: CPT

## 2025-02-24 NOTE — TELEPHONE ENCOUNTER
Echo 2/24/2025 noted. Ordered after episode of fluttering with rapid rate during febrile illness.  patient was tested for influenza, covid and strep which were all negative.     Phone note 2/13/2025  Patient instructed on importance of managing fevers by alternating both tylenol and ibuprofen and instructed on max doses to be taken in 24 hours. Instructed again on importance of adequate hydration. Instructed to only use PRN metoprolol if SBP is >100 as this medication can drop her blood pressure, particularly if dehydrated     2/15/2205 - present to ED for viral symptoms also - Temp 104  2/17/2025 - PCP gave her a z-pack  2/20/2025 - PCP thinks she can manage at this point with OTC meds    Echo:  Global and regional left ventricular function is normal with an EF of 55-60%.  Right ventricular function, chamber size, wall motion, and thickness are normal.  Both atria appear normal.  IVC diameter and respiratory changes fall into an intermediate range suggesting an RA pressure of 8 mmHg.  No significant valvular abnormalities present.  No pericardial effusion is present.  No significant changes noted.    Patient is due back in June.    Will message Dr. Finch to review         resilient/elastic

## 2025-03-17 ENCOUNTER — PATIENT OUTREACH (OUTPATIENT)
Dept: CARE COORDINATION | Facility: CLINIC | Age: 40
End: 2025-03-17
Payer: COMMERCIAL

## 2025-03-31 ENCOUNTER — PATIENT OUTREACH (OUTPATIENT)
Dept: CARE COORDINATION | Facility: CLINIC | Age: 40
End: 2025-03-31
Payer: COMMERCIAL

## 2025-06-22 DIAGNOSIS — L70.0 ACNE VULGARIS: ICD-10-CM

## 2025-06-23 ENCOUNTER — OFFICE VISIT (OUTPATIENT)
Dept: CARDIOLOGY | Facility: CLINIC | Age: 40
End: 2025-06-23
Payer: COMMERCIAL

## 2025-06-23 VITALS
OXYGEN SATURATION: 97 % | HEIGHT: 70 IN | HEART RATE: 100 BPM | BODY MASS INDEX: 19.04 KG/M2 | DIASTOLIC BLOOD PRESSURE: 66 MMHG | WEIGHT: 133 LBS | SYSTOLIC BLOOD PRESSURE: 100 MMHG

## 2025-06-23 DIAGNOSIS — I48.0 PAF (PAROXYSMAL ATRIAL FIBRILLATION) (H): Primary | ICD-10-CM

## 2025-06-23 PROCEDURE — 3078F DIAST BP <80 MM HG: CPT | Performed by: NURSE PRACTITIONER

## 2025-06-23 PROCEDURE — 99213 OFFICE O/P EST LOW 20 MIN: CPT | Performed by: NURSE PRACTITIONER

## 2025-06-23 PROCEDURE — 3074F SYST BP LT 130 MM HG: CPT | Performed by: NURSE PRACTITIONER

## 2025-06-23 RX ORDER — METOPROLOL SUCCINATE 25 MG/1
25 TABLET, EXTENDED RELEASE ORAL PRN
COMMUNITY

## 2025-06-23 NOTE — LETTER
2025    [facility]  [address]  [place] [address]    RE: Debbi Grossman       Dear Colleague,     I had the pleasure of seeing Debbi Grossman in the [facility].  Cardiology Clinic Progress Note  Debbi Grossman MRN# [id number]   YOB: 1985 Age: [unfilled]     Primary cardiologist: Dr. Finch    Reason for visit: Annual follow-up    History of presenting illness:    Debbi Grossman is a pleasant 39-year-old female who follows closely with Dr. Finch.    She was in originally seen in  after hospitalization for paroxysmal atrial fibrillation in the context of active labor.  At the time, she converted spontaneously to normal sinus rhythm after receiving IV diltiazem.  An echocardiogram was essentially normal, Zio patch demonstrated no evidence of recurrent atrial fibrillation.    She was then seen again in 2022 by Dr. Finch about 18 weeks into her second pregnancy.  At the time she was doing well overall from a cardiovascular standpoint without evidence of recurrent atrial fibrillation.  She was subsequent found to have recurrent atrial fibrillation at the time of labor and ultimately underwent .  She converted spontaneously to normal sinus rhythm during that hospitalization.  She had another episode in  while undergoing hemorrhoid banding procedure.  Again she converted spontaneously to normal rhythm.     The patient was last seen by Dr. Finch 2024 at which time she was doing quite well without signs of recurrent atrial fibrillation.  Nonetheless, she was referred for cardiac MRI that showed normal LV function, no evidence of scarring or genetic cardiomyopathy.  Repeat Zio patch showed no atrial fibrillation, rare PVCs and PACs.    In February of this year, she was diagnosed with walking pneumonia.  She had persistent fevers greater as well as tachycardia with heart rate above 100 for days.  EKG around that time showed sinus  tachycardia.  She was prescribed Toprol-XL 25 mg daily as needed.    Today she returns for annual follow-up.  She is a very active individual who works out on a regular basis.  This 1 to 2 miles daily and plays [email or web address] ball 2 to 3 days a week.  According to her Apple Watch, her heart rate ranges anywhere between 53 and 160 bpm with an average heart rate around 70 bpm.  She denies chest pain, shortness of breath, syncope or near syncope, or palpitations.    Echocardiogram from 2/24/2025 shows LVEF of 55 to 60%, normal biatrial size, and no significant valvular pathology.      Lipids from 4/2025 demonstrate total cholesterol 210, triglycerides of 28, , and LDL 94.          Assessment and Plan:     ASSESSMENT: Pertinent issues addressed at this visit     Paroxysmal atrial fibrillation.  Overall doing well without any recurrent episodes.  She monitors her heart rate and rhythm closely with her Apple Watch.  She takes Toprol-XL on an as-needed basis, but has not ever needed to take a dose.  She remains in normal sinus rhythm on exam today.    PLAN:     Debbi continues to do well from a cardiac standpoint, she has had no recurrent atrial fibrillation.  Fortunately she has not needed any metoprolol, but she will continue to take this if she has prolonged episode of atrial fibrillation, but certainly she will need to check her blood pressure prior to taking this.  If at any given point, she has frequent recurrent episodes of atrial fibrillation, we may need to consider referral to electrophysiology.  Follow-up in 1 year, sooner if needed.         Kathryn Green DNP, APRN, CNP  Page: [phone] (8a-5p M-F)    Orders this Visit:  Orders Placed This Encounter   Procedures     Follow-Up with Cardiology     Orders Placed This Encounter   Medications     metoprolol succinate ER (TOPROL XL) 25 MG 24 hr tablet     Sig: Take 25 mg by mouth as needed (If HR > 100).     There are no discontinued medications.    Today's clinic  "visit entailed:  Review of the result(s) of each unique test - echo, labs, EKG, monitor, MRI  Ordering of each unique test  Prescription drug management  35 minutes spent by me on the date of the encounter doing chart review, review of test results, interpretation of tests, patient visit, and documentation   Provider  Link to [facility] Help Grid     The level of medical decision making during this visit was of moderate complexity.           Review of Systems:     Review of Systems:  Skin:        Eyes:       ENT:       Respiratory:  Negative    Cardiovascular:  Negative    Gastroenterology:      Genitourinary:       Musculoskeletal:       Neurologic:       Psychiatric:       Heme/Lymph/Imm:       Endocrine:                 Physical Exam:   Vitals: /66 (BP Location: Left arm, Patient Position: Sitting, Cuff Size: Adult Regular)   Pulse 100   Ht 1.778 m (5' 10\")   Wt 60.3 kg (133 lb)   SpO2 97%   BMI 19.08 kg/m    Constitutional:  cooperative        Skin:  warm and dry to the touch        Head:  normocephalic        Eyes:  pupils equal and round        ENT:  not assessed this visit        Neck:  JVP normal        Chest:  normal breath sounds, clear to auscultation, normal A-P diameter, normal symmetry, normal respiratory excursion, no use of accessory muscles        Cardiac: regular rhythm, normal S1 and S2, no murmurs, gallops or rubs detected                  Abdomen:  abdomen soft        Vascular: pulses full and equal                                      Extremities and Back:  no edema        Neurological:  affect appropriate, no gross motor deficits             Medications:     Current Outpatient Medications   Medication Sig Dispense Refill     metoprolol succinate ER (TOPROL XL) 25 MG 24 hr tablet Take 25 mg by mouth as needed (If HR > 100).       Prenatal Multivit-Min-Fe-FA (PRENATAL VITAMINS PO) Take 1 tablet by mouth       tretinoin (RETIN-A) 0.025 % external cream Use every night. 45 g 6       Family " History   Problem Relation Age of Onset     Skin Cancer Mother      Hypertension Father      Diabetes Father      Transient ischemic attack Father      Neurologic Disorder Sister      Depression Brother         bipolar depression     Neurologic Disorder Brother         epliepsy     Psychotic Disorder Maternal Grandmother      Prostate Cancer Maternal Grandfather      Cancer - colorectal Maternal Grandfather      Diabetes Maternal Grandfather      Skin Cancer Paternal Grandmother      No Known Problems Paternal Grandfather      No Known Problems Other        Social History     Socioeconomic History     Marital status:      Spouse name: rBaeden     Number of children: 2     Years of education: Not on file     Highest education level: Not on file   Occupational History     Occupation: Supply Chain     Employer: [employer]   Tobacco Use     Smoking status: Never     Smokeless tobacco: Never   Vaping Use     Vaping status: Never Used   Substance and Sexual Activity     Alcohol use: Yes     Comment: Less than once a month     Drug use: No     Sexual activity: Yes     Partners: Male     Birth control/protection: Male Surgical     Comment: vasectomy   Other Topics Concern     Parent/sibling w/ CABG, MI or angioplasty before 65F 55M? Not Asked   Social History Narrative     Not on file     Social Drivers of Health     Financial Resource Strain: Not on file   Food Insecurity: Not on file   Transportation Needs: Not on file   Physical Activity: Not on file   Stress: Not on file   Social Connections: Not on file   Interpersonal Safety: Low Risk  (2/17/2025)    Interpersonal Safety      Do you feel physically and emotionally safe where you currently live?: Yes      Within the past 12 months, have you been hit, slapped, kicked or otherwise physically hurt by someone?: No      Within the past 12 months, have you been humiliated or emotionally abused in other ways by your partner or ex-partner?: No   Housing Stability: Not on  file            Past Medical History:     Past Medical History:   Diagnosis Date     Acute blood loss anemia 2019     Anxiety     Therapy--primarily due to last delivery and fear around this delivery     CP (cerebral palsy) (H)      History of blood transfusion      Infection due to  novel coronavirus 2022    cough     PAF (paroxysmal atrial fibrillation) (H)     2022 --both episodes during labor     Scoliosis 05/10/2012     Seasonal allergies      Status post  delivery 2019              Past Surgical History:     Past Surgical History:   Procedure Laterality Date     BAND HEMORRHOIDECTOMY  2023      SECTION N/A 2019    Procedure:  SECTION;  Surgeon: Natalee Farris MD;  Location:  [facility]      SECTION N/A 2022    Procedure: REPEAT  SECTION;  Surgeon: Natalee Farris MD;  Location:  [facility]     CYSTOSCOPY N/A 2019    Procedure: CYSTOSCOPY;  Surgeon: Natalee Farris MD;  Location:  OR     CYSTOSCOPY, CYSTOGRAM, COMBINED N/A 2019    Procedure: Cystoscopy, cystogram, combined;  Surgeon: Pedro Luis Mireles MD;  Location:  OR     CYSTOSCOPY, RETROGRADES, INSERT STENT URETER(S), COMBINED  2019    Procedure: CYSTOSCOPY WITH BILATERAL RETROGRADES/ BILATERAL STENT PLACEMENT AND REMOVAL/ CYSTOGRAM/ BLADDER REPAIR;  Surgeon: Pedro Luis Mireles MD;  Location:  OR      REMOVE TONSILS/ADENOIDS,<11 Y/O       HC TOOTH EXTRACTION W/FORCEP      [place] [facility]     LAPAROTOMY EXPLORATORY N/A 2019    Procedure: EXPLORATORY LAPAROTOMY WITH REPEAT EXPLORATORY LAPAROTOMY WITH BLADDER REPAIR;  Surgeon: Natalee Farris MD;  Location:  OR     REPAIR BLADDER N/A 2019    Procedure: REPAIR, BLADDER;  Surgeon: Natalee Farris MD;  Location:  OR              Allergies:   Seasonal allergies       Data:   All laboratory data reviewed:    Recent Labs   Lab Test 25  0954 24  0912 24  0818  "11/16/23  0812 11/18/20  0828 08/23/19  0352   LDL 94  --  110* 98   < >  --      --  113 71   < >  --    NHDL 100  --  117 107   < >  --    CHOL 210*  --  230* 178   < >  --    TRIG 28  --  36 45   < >  --    TSH  --  2.65  --   --   --  4.52*    < > = values in this interval not displayed.       Lab Results   Component Value Date    WBC 13.4 (H) 01/16/2024    WBC 6.0 09/11/2019    RBC 4.68 01/16/2024    RBC 3.56 (L) 09/11/2019    HGB 13.6 01/16/2024    HGB 11.0 (L) 09/11/2019    HCT 41.9 01/16/2024    HCT 34.1 (L) 09/11/2019    MCV 90 01/16/2024    MCV 96 09/11/2019    MCH 29.1 01/16/2024    MCH 30.9 09/11/2019    MCHC 32.5 01/16/2024    MCHC 32.3 09/11/2019    RDW 13.9 01/16/2024    RDW 13.5 09/11/2019     01/16/2024     09/11/2019       Lab Results   Component Value Date     01/16/2024     (H) 08/27/2019    POTASSIUM 4.1 01/16/2024    POTASSIUM 3.5 08/27/2019    CHLORIDE 108 (H) 01/16/2024    CHLORIDE 113 (H) 08/27/2019    CO2 24 01/16/2024    CO2 27 08/27/2019    ANIONGAP 9 01/16/2024    ANIONGAP 5 08/27/2019     (H) 01/16/2024    GLC 84 08/27/2019    BUN 15.8 01/16/2024    BUN 3 (L) 08/27/2019    CR 0.59 01/16/2024    CR 0.55 08/27/2019    GFRESTIMATED >90 01/16/2024    GFRESTIMATED >90 08/27/2019    GFRESTBLACK >90 08/27/2019    MITZI 8.4 (L) 01/16/2024    MITZI 7.9 (L) 08/27/2019      Lab Results   Component Value Date    AST 19 01/16/2024    AST 36 08/26/2019    ALT 15 01/16/2024    ALT 18 08/26/2019       No results found for: \"A1C\"    Lab Results   Component Value Date    INR 1.23 (H) 08/24/2019    INR 1.27 (H) 08/24/2019         Thank you for allowing me to participate in the care of your patient.      Sincerely,     Kathryn Green, [facility]  cc:   Ishmael Finch MD  [address] [place] [address],  [place] [address]      "

## 2025-06-23 NOTE — PATIENT INSTRUCTIONS
Today's Plan:     - Continue Toprol XL 25 mg as needed for HR > 100 bpm.   - Follow-up in 1 year, sooner if needed.     If you have questions or concerns please call my nurse team:  Elva RN ([phone]) Christi RN ([phone]) or Carla RN ([phone])    After Hours: [phone], option #2, ask for cardiologist on-call    Scheduling phone number: [phone]    Reminder: Please bring in all current medications, over the counter supplements and vitamin bottles to your next appointment.-    It was a pleasure seeing you today!     Kathryn Green, [facility]  6/23/2025    -

## 2025-06-23 NOTE — PROGRESS NOTES
Cardiology Clinic Progress Note  Debbi Grossman MRN# [id number]   YOB: 1985 Age: [unfilled]     Primary cardiologist: Dr. Finch    Reason for visit: Annual follow-up    History of presenting illness:    Debbi Grossman is a pleasant 39-year-old female who follows closely with Dr. Finch.    She was in originally seen in  after hospitalization for paroxysmal atrial fibrillation in the context of active labor.  At the time, she converted spontaneously to normal sinus rhythm after receiving IV diltiazem.  An echocardiogram was essentially normal, Zio patch demonstrated no evidence of recurrent atrial fibrillation.    She was then seen again in 2022 by Dr. Finch about 18 weeks into her second pregnancy.  At the time she was doing well overall from a cardiovascular standpoint without evidence of recurrent atrial fibrillation.  She was subsequent found to have recurrent atrial fibrillation at the time of labor and ultimately underwent .  She converted spontaneously to normal sinus rhythm during that hospitalization.  She had another episode in  while undergoing hemorrhoid banding procedure.  Again she converted spontaneously to normal rhythm.     The patient was last seen by Dr. Finch 2024 at which time she was doing quite well without signs of recurrent atrial fibrillation.  Nonetheless, she was referred for cardiac MRI that showed normal LV function, no evidence of scarring or genetic cardiomyopathy.  Repeat Zio patch showed no atrial fibrillation, rare PVCs and PACs.    In February of this year, she was diagnosed with walking pneumonia.  She had persistent fevers greater as well as tachycardia with heart rate above 100 for days.  EKG around that time showed sinus tachycardia.  She was prescribed Toprol-XL 25 mg daily as needed.    Today she returns for annual follow-up.  She is a very active individual who works out on a regular basis.  This 1 to 2 miles daily and plays  pickle ball 2 to 3 days a week.  According to her Apple Watch, her heart rate ranges anywhere between 53 and 160 bpm with an average heart rate around 70 bpm.  She denies chest pain, shortness of breath, syncope or near syncope, or palpitations.    Echocardiogram from 2/24/2025 shows LVEF of 55 to 60%, normal biatrial size, and no significant valvular pathology.      Lipids from 4/2025 demonstrate total cholesterol 210, triglycerides of 28, , and LDL 94.          Assessment and Plan:     ASSESSMENT: Pertinent issues addressed at this visit     Paroxysmal atrial fibrillation.  Overall doing well without any recurrent episodes.  She monitors her heart rate and rhythm closely with her Apple Watch.  She takes Toprol-XL on an as-needed basis, but has not ever needed to take a dose.  She remains in normal sinus rhythm on exam today.    PLAN:     Debbi continues to do well from a cardiac standpoint, she has had no recurrent atrial fibrillation.  Fortunately she has not needed any metoprolol, but she will continue to take this if she has prolonged episode of atrial fibrillation, but certainly she will need to check her blood pressure prior to taking this.  If at any given point, she has frequent recurrent episodes of atrial fibrillation, we may need to consider referral to electrophysiology.  Follow-up in 1 year, sooner if needed.         Kathryn Green, ROSA, APRN, CNP  Page: [phone] (8a-5p M-F)    Orders this Visit:  Orders Placed This Encounter   Procedures    Follow-Up with Cardiology     Orders Placed This Encounter   Medications    metoprolol succinate ER (TOPROL XL) 25 MG 24 hr tablet     Sig: Take 25 mg by mouth as needed (If HR > 100).     There are no discontinued medications.    Today's clinic visit entailed:  Review of the result(s) of each unique test - echo, labs, EKG, monitor, MRI  Ordering of each unique test  Prescription drug management  35 minutes spent by me on the date of the encounter doing  "chart review, review of test results, interpretation of tests, patient visit, and documentation   Provider  Link to [facility] Help Grid     The level of medical decision making during this visit was of moderate complexity.           Review of Systems:     Review of Systems:  Skin:        Eyes:       ENT:       Respiratory:  Negative    Cardiovascular:  Negative    Gastroenterology:      Genitourinary:       Musculoskeletal:       Neurologic:       Psychiatric:       Heme/Lymph/Imm:       Endocrine:                 Physical Exam:   Vitals: /66 (BP Location: Left arm, Patient Position: Sitting, Cuff Size: Adult Regular)   Pulse 100   Ht 1.778 m (5' 10\")   Wt 60.3 kg (133 lb)   SpO2 97%   BMI 19.08 kg/m    Constitutional:  cooperative        Skin:  warm and dry to the touch        Head:  normocephalic        Eyes:  pupils equal and round        ENT:  not assessed this visit        Neck:  JVP normal        Chest:  normal breath sounds, clear to auscultation, normal A-P diameter, normal symmetry, normal respiratory excursion, no use of accessory muscles        Cardiac: regular rhythm, normal S1 and S2, no murmurs, gallops or rubs detected                  Abdomen:  abdomen soft        Vascular: pulses full and equal                                      Extremities and Back:  no edema        Neurological:  affect appropriate, no gross motor deficits             Medications:     Current Outpatient Medications   Medication Sig Dispense Refill    metoprolol succinate ER (TOPROL XL) 25 MG 24 hr tablet Take 25 mg by mouth as needed (If HR > 100).      Prenatal Multivit-Min-Fe-FA (PRENATAL VITAMINS PO) Take 1 tablet by mouth      tretinoin (RETIN-A) 0.025 % external cream Use every night. 45 g 6       Family History   Problem Relation Age of Onset    Skin Cancer Mother     Hypertension Father     Diabetes Father     Transient ischemic attack Father     Neurologic Disorder Sister     Depression Brother         bipolar " depression    Neurologic Disorder Brother         epliepsy    Psychotic Disorder Maternal Grandmother     Prostate Cancer Maternal Grandfather     Cancer - colorectal Maternal Grandfather     Diabetes Maternal Grandfather     Skin Cancer Paternal Grandmother     No Known Problems Paternal Grandfather     No Known Problems Other        Social History     Socioeconomic History    Marital status:      Spouse name: Braeden    Number of children: 2    Years of education: Not on file    Highest education level: Not on file   Occupational History    Occupation: Supply Chain     Employer: [employer]   Tobacco Use    Smoking status: Never    Smokeless tobacco: Never   Vaping Use    Vaping status: Never Used   Substance and Sexual Activity    Alcohol use: Yes     Comment: Less than once a month    Drug use: No    Sexual activity: Yes     Partners: Male     Birth control/protection: Male Surgical     Comment: vasectomy   Other Topics Concern    Parent/sibling w/ CABG, MI or angioplasty before 65F 55M? Not Asked   Social History Narrative    Not on file     Social Drivers of Health     Financial Resource Strain: Not on file   Food Insecurity: Not on file   Transportation Needs: Not on file   Physical Activity: Not on file   Stress: Not on file   Social Connections: Not on file   Interpersonal Safety: Low Risk  (2/17/2025)    Interpersonal Safety     Do you feel physically and emotionally safe where you currently live?: Yes     Within the past 12 months, have you been hit, slapped, kicked or otherwise physically hurt by someone?: No     Within the past 12 months, have you been humiliated or emotionally abused in other ways by your partner or ex-partner?: No   Housing Stability: Not on file            Past Medical History:     Past Medical History:   Diagnosis Date    Acute blood loss anemia 08/24/2019    Anxiety     Therapy--primarily due to last delivery and fear around this delivery    CP (cerebral palsy) (H)     History of  blood transfusion     Infection due to  novel coronavirus 2022    cough    PAF (paroxysmal atrial fibrillation) (H)     2022 --both episodes during labor    Scoliosis 05/10/2012    Seasonal allergies     Status post  delivery 2019              Past Surgical History:     Past Surgical History:   Procedure Laterality Date    BAND HEMORRHOIDECTOMY  2023     SECTION N/A 2019    Procedure:  SECTION;  Surgeon: Natalee Farris MD;  Location:  [facility]     SECTION N/A 2022    Procedure: REPEAT  SECTION;  Surgeon: Natalee Farris MD;  Location:  [facility]    CYSTOSCOPY N/A 2019    Procedure: CYSTOSCOPY;  Surgeon: Natalee Farris MD;  Location:  OR    CYSTOSCOPY, CYSTOGRAM, COMBINED N/A 2019    Procedure: Cystoscopy, cystogram, combined;  Surgeon: Pedro Luis Mireles MD;  Location:  OR    CYSTOSCOPY, RETROGRADES, INSERT STENT URETER(S), COMBINED  2019    Procedure: CYSTOSCOPY WITH BILATERAL RETROGRADES/ BILATERAL STENT PLACEMENT AND REMOVAL/ CYSTOGRAM/ BLADDER REPAIR;  Surgeon: Pedro Luis Mireles MD;  Location:  OR    HC REMOVE TONSILS/ADENOIDS,<13 Y/O      HC TOOTH EXTRACTION W/FORCEP      [place] [facility]    LAPAROTOMY EXPLORATORY N/A 2019    Procedure: EXPLORATORY LAPAROTOMY WITH REPEAT EXPLORATORY LAPAROTOMY WITH BLADDER REPAIR;  Surgeon: Natalee Farris MD;  Location:  OR    REPAIR BLADDER N/A 2019    Procedure: REPAIR, BLADDER;  Surgeon: Natalee Farris MD;  Location:  OR              Allergies:   Seasonal allergies       Data:   All laboratory data reviewed:    Recent Labs   Lab Test 25  0954 24  0912 24  0818 23  0812 20  0828 19  0352   LDL 94  --  110* 98   < >  --      --  113 71   < >  --    NHDL 100  --  117 107   < >  --    CHOL 210*  --  230* 178   < >  --    TRIG 28  --  36 45   < >  --    TSH  --  2.65  --   --   --  4.52*    < > =  "values in this interval not displayed.       Lab Results   Component Value Date    WBC 13.4 (H) 01/16/2024    WBC 6.0 09/11/2019    RBC 4.68 01/16/2024    RBC 3.56 (L) 09/11/2019    HGB 13.6 01/16/2024    HGB 11.0 (L) 09/11/2019    HCT 41.9 01/16/2024    HCT 34.1 (L) 09/11/2019    MCV 90 01/16/2024    MCV 96 09/11/2019    MCH 29.1 01/16/2024    MCH 30.9 09/11/2019    MCHC 32.5 01/16/2024    MCHC 32.3 09/11/2019    RDW 13.9 01/16/2024    RDW 13.5 09/11/2019     01/16/2024     09/11/2019       Lab Results   Component Value Date     01/16/2024     (H) 08/27/2019    POTASSIUM 4.1 01/16/2024    POTASSIUM 3.5 08/27/2019    CHLORIDE 108 (H) 01/16/2024    CHLORIDE 113 (H) 08/27/2019    CO2 24 01/16/2024    CO2 27 08/27/2019    ANIONGAP 9 01/16/2024    ANIONGAP 5 08/27/2019     (H) 01/16/2024    GLC 84 08/27/2019    BUN 15.8 01/16/2024    BUN 3 (L) 08/27/2019    CR 0.59 01/16/2024    CR 0.55 08/27/2019    GFRESTIMATED >90 01/16/2024    GFRESTIMATED >90 08/27/2019    GFRESTBLACK >90 08/27/2019    MITZI 8.4 (L) 01/16/2024    MITZI 7.9 (L) 08/27/2019      Lab Results   Component Value Date    AST 19 01/16/2024    AST 36 08/26/2019    ALT 15 01/16/2024    ALT 18 08/26/2019       No results found for: \"A1C\"    Lab Results   Component Value Date    INR 1.23 (H) 08/24/2019    INR 1.27 (H) 08/24/2019         "

## 2025-06-25 RX ORDER — TRETINOIN 0.25 MG/G
CREAM TOPICAL
Qty: 45 G | Refills: 4 | Status: SHIPPED | OUTPATIENT
Start: 2025-06-25

## 2025-06-25 NOTE — TELEPHONE ENCOUNTER
Last Written Prescription:  tretinoin (RETIN-A) 0.025 % external cream             Summary: Use every night. Disp-45 g, R-6 E-Prescribe  Start: 01/23/2024Ordered On: 01/23/2024Pharmacy: [id number] - [place], [place] - [address] SReportDx Associated: Taking: Long-term: Med Note:                Directions: Use every night.  Ordering Department: [facility]  Authorized By: Herbert Dickens MD  Dispense: 45 g  Refills: 6 ordered       ----------------------  Last Visit Date: 01/23/2024 Office Visit Derm - KELIN Dickens   Future Visit Date: None  ----------------------      Refill decision: Medication refilled per  Medication Refill in Ambulatory Care  policy.   []  If no future appointment scheduled: Route to Clinic Coordinators to contact the pt for appointment.      Refill decision: Medication unable to be refilled by RN due to: Pt not seen within past 12 months, No FOV or FOV exceeds timeframe per protocol          Request from pharmacy:  Requested Prescriptions   Pending Prescriptions Disp Refills    tretinoin (RETIN-A) 0.025 % external cream [Pharmacy Med Name: TRETINOIN 0.025% CREAM] 45 g 6     Sig: APPLY TO AFFECTED AREA EVERY DAY IN THE EVENING       Topical Acne Medications Protocol Failed - 6/25/2025  1:52 PM        Failed - Medication is active on med list and the sig matches. RN to manually verify dose and sig if red X/fail.     If the protocol passes (green check), you do not need to verify med dose and sig.    A prescription matches if they are the same clinical intention.    For Example: once daily and every morning are the same.    The protocol can not identify upper and lower case letters as matching and will fail.     For Example: Take 1 tablet (50 mg) by mouth daily     TAKE 1 TABLET (50 MG) BY MOUTH DAILY    For all fails (red x), verify dose and sig.    If the refill does match what is on file, the RN can still proceed to approve the refill request.       If they do not match, route to the  appropriate provider.             Failed - Recent (12 month) or future (90 days) visit with authorizing provider's specialty (provided they have been seen in the past 15 months)     The patient must have completed an in-person or virtual visit within the past 12 months or has a future visit scheduled within the next 90 days with the authorizing provider s specialty.  Urgent care and e-visits do not qualify as an office visit for this protocol.          Passed - Patient is 12 years of age or older         Tamanna B, RN  P Central Nursing/Red Flag Triage & Med Refill Team

## 2025-06-25 NOTE — TELEPHONE ENCOUNTER
6/25 Left Voicemail (1st Attempt) and Sent Mychart (1st Attempt) for the patient to call back and schedule the following:    Appointment type: Return Dermatology  Provider: Maninder  Return date: Next Available   Specialty phone number: [phone]  Additional appointment(s) needed: na  Additonal Notes: Medication Refill

## (undated) DEVICE — NDL ANGIOCATH 16GA 2" 4082

## (undated) DEVICE — BLADE CLIPPER 4406

## (undated) DEVICE — SUCTION TIP YANKAUER W/O VENT K86

## (undated) DEVICE — WIRE GUIDE AMPLATZ SUPER STIFF 0.035"X145CM 46-524

## (undated) DEVICE — GLOVE PROTEXIS POWDER FREE 7.0 ORTHOPEDIC 2D73ET70

## (undated) DEVICE — CATH TRAY FOLEY 16FR BARDEX W/DRAIN BAG STATLOCK 300316A

## (undated) DEVICE — SU MONOCRYL 4-0 PS-2 18" UND Y496G

## (undated) DEVICE — PREP CHLORAPREP 26ML TINTED ORANGE  260815

## (undated) DEVICE — SOL NACL 0.9% IRRIG 1000ML BOTTLE 07138-09

## (undated) DEVICE — SOL WATER IRRIG 1000ML BOTTLE 2F7114

## (undated) DEVICE — SYR 50ML CATH TIP W/O NDL 309620

## (undated) DEVICE — ESU PENCIL W/HOLSTER E2350H

## (undated) DEVICE — SU SILK 0 24" TIE SA76G

## (undated) DEVICE — SU ETHILON 3-0 FS-1 18" 669H

## (undated) DEVICE — SU PDS II 0 CTX 60" Z990G

## (undated) DEVICE — GLOVE BIOGEL PI MICRO SZ 6.0 48560

## (undated) DEVICE — SUCTION CANISTER MEDIVAC LINER 3000ML W/LID 65651-530

## (undated) DEVICE — DRSG GAUZE 4X4" TOPPER

## (undated) DEVICE — TUBING IRRIG CYSTO/BLADDER SET 81" LF 2C4040

## (undated) DEVICE — Device

## (undated) DEVICE — SYR 10ML SLIP TIP W/O NDL 303134

## (undated) DEVICE — LIGHT HANDLE X2

## (undated) DEVICE — WIPES FOLEY CARE SURESTEP PROVON DFC100

## (undated) DEVICE — SU VICRYL 3-0 SH 27" J316H

## (undated) DEVICE — SU VICRYL 0 CT 36" J358H

## (undated) DEVICE — DRAPE SHEET REV FOLD 3/4 9349

## (undated) DEVICE — KIT SURGICAL TURNOVER FVSD-01D

## (undated) DEVICE — DRAPE LAVH/LAPAROSCOPY W/POUCH 29474

## (undated) DEVICE — DRAIN JACKSON PRATT 15FR ROUND SU130-1323

## (undated) DEVICE — SOL WATER 3000ML BAG 7973-08

## (undated) DEVICE — SOL NACL 0.9% IRRIG 1000ML BOTTLE 2F7124

## (undated) DEVICE — SPONGE LAP 18X18" X8435

## (undated) DEVICE — BASIN SET MINOR DISP

## (undated) DEVICE — LINEN C-SECTION 5415

## (undated) DEVICE — SURGICEL POWDER ABSORBABLE HEMOSTAT 3GM 3013SP

## (undated) DEVICE — TUBING SUCTION 12"X1/4" N612

## (undated) DEVICE — PACK C-SECTION LF PL15OTA83B

## (undated) DEVICE — SYR 03ML LL W/O NDL 309657

## (undated) DEVICE — PACK MAJOR SBA15MAFSI

## (undated) DEVICE — CATH URETERAL FLEX TIP TIGERTAIL 06FRX70CM 139006

## (undated) DEVICE — RAD RX ISOVUE 300 (50ML) 61% IOPAMIDOL CHARGE PER ML

## (undated) DEVICE — DRSG TELFA ISLAND 4X10"

## (undated) DEVICE — NDL 19GA 1.5"

## (undated) DEVICE — DRSG ABDOMINAL 07 1/2X8" 7197D

## (undated) DEVICE — DRSG STERI STRIP 1/2X4" R1547

## (undated) DEVICE — CATH TRAY FOLEY SURESTEP 16FR WDRAIN BAG STLK LATEX A300316A

## (undated) DEVICE — DRAPE C-ARM 60X42" 1013

## (undated) DEVICE — GLOVE BIOGEL PI MICRO INDICATOR UNDERGLOVE SZ 6.5 48965

## (undated) DEVICE — LINEN TOWEL PACK X5 5464

## (undated) DEVICE — EVACUATOR BLADDER UROVAC LATEX M0067301250

## (undated) DEVICE — CATH URETERAL OPEN END 6FR

## (undated) DEVICE — DRAIN JACKSON PRATT RESERVOIR 100ML SU130-1305

## (undated) DEVICE — STPL SKIN PROXIMATE 35 WIDE PMW35

## (undated) DEVICE — NDL ANGIOCATH 14GA 1.25" 4048

## (undated) DEVICE — DRAPE LAP W/ARMBOARD 29410

## (undated) DEVICE — SU UMBILICAL TAPE .125X30" U11T

## (undated) DEVICE — ESU GROUND PAD UNIVERSAL W/O CORD

## (undated) DEVICE — STENT URETERAL CONTOUR SOFT PERCUFLEX 6FRX26CM
Type: IMPLANTABLE DEVICE | Site: URETER | Status: NON-FUNCTIONAL
Removed: 2019-08-23

## (undated) DEVICE — PREP SKIN SCRUB TRAY 4461A

## (undated) DEVICE — SYR 70ML TOOMEY 041170

## (undated) DEVICE — GLOVE PROTEXIS W/NEU-THERA 6.0  2D73TE60

## (undated) DEVICE — GLOVE PROTEXIS W/NEU-THERA 6.5  2D73TE65

## (undated) DEVICE — SYR 10ML SLIP TIP W/O NDL

## (undated) DEVICE — SPONGE KITTNER 31001010

## (undated) DEVICE — DRSG TELFA 3X8" 1238

## (undated) DEVICE — DRAPE STERI INCISE 1050

## (undated) RX ORDER — OXYTOCIN/0.9 % SODIUM CHLORIDE 30/500 ML
PLASTIC BAG, INJECTION (ML) INTRAVENOUS
Status: DISPENSED
Start: 2022-12-27

## (undated) RX ORDER — FENTANYL CITRATE 50 UG/ML
INJECTION, SOLUTION INTRAMUSCULAR; INTRAVENOUS
Status: DISPENSED
Start: 2019-08-23

## (undated) RX ORDER — CIPROFLOXACIN 500 MG/1
TABLET, FILM COATED ORAL
Status: DISPENSED
Start: 2019-09-12

## (undated) RX ORDER — LIDOCAINE HYDROCHLORIDE 20 MG/ML
INJECTION, SOLUTION EPIDURAL; INFILTRATION; INTRACAUDAL; PERINEURAL
Status: DISPENSED
Start: 2019-08-23

## (undated) RX ORDER — PROPOFOL 10 MG/ML
INJECTION, EMULSION INTRAVENOUS
Status: DISPENSED
Start: 2019-08-23

## (undated) RX ORDER — CEFAZOLIN SODIUM 1 G/3ML
INJECTION, POWDER, FOR SOLUTION INTRAMUSCULAR; INTRAVENOUS
Status: DISPENSED
Start: 2019-08-24

## (undated) RX ORDER — HYDROMORPHONE HYDROCHLORIDE 1 MG/ML
INJECTION, SOLUTION INTRAMUSCULAR; INTRAVENOUS; SUBCUTANEOUS
Status: DISPENSED
Start: 2019-08-24

## (undated) RX ORDER — CEFAZOLIN SODIUM 1 G/3ML
INJECTION, POWDER, FOR SOLUTION INTRAMUSCULAR; INTRAVENOUS
Status: DISPENSED
Start: 2019-08-23

## (undated) RX ORDER — ALBUMIN, HUMAN INJ 5% 5 %
SOLUTION INTRAVENOUS
Status: DISPENSED
Start: 2019-08-23

## (undated) RX ORDER — MORPHINE SULFATE 1 MG/ML
INJECTION, SOLUTION EPIDURAL; INTRATHECAL; INTRAVENOUS
Status: DISPENSED
Start: 2022-12-27